# Patient Record
Sex: FEMALE | Race: WHITE | NOT HISPANIC OR LATINO | Employment: OTHER | URBAN - NONMETROPOLITAN AREA
[De-identification: names, ages, dates, MRNs, and addresses within clinical notes are randomized per-mention and may not be internally consistent; named-entity substitution may affect disease eponyms.]

---

## 2017-01-16 ENCOUNTER — TRANSCRIBE ORDERS (OUTPATIENT)
Dept: ADMINISTRATIVE | Facility: HOSPITAL | Age: 67
End: 2017-01-16

## 2017-01-16 DIAGNOSIS — R00.2 PALPITATIONS: ICD-10-CM

## 2017-01-16 DIAGNOSIS — I15.9 SECONDARY HYPERTENSION: ICD-10-CM

## 2017-01-16 DIAGNOSIS — I10 ESSENTIAL HYPERTENSION: Primary | ICD-10-CM

## 2017-01-19 ENCOUNTER — HOSPITAL ENCOUNTER (INPATIENT)
Facility: HOSPITAL | Age: 67
LOS: 1 days | Discharge: HOME | End: 2017-01-20
Attending: INTERNAL MEDICINE | Admitting: INTERNAL MEDICINE

## 2017-01-19 ENCOUNTER — HOSPITAL ENCOUNTER (OUTPATIENT)
Dept: CARDIOLOGY | Facility: HOSPITAL | Age: 67
Discharge: HOME | End: 2017-01-19
Admitting: NURSE PRACTITIONER

## 2017-01-19 ENCOUNTER — APPOINTMENT (OUTPATIENT)
Dept: GENERAL RADIOLOGY | Facility: HOSPITAL | Age: 67
End: 2017-01-19

## 2017-01-19 VITALS
SYSTOLIC BLOOD PRESSURE: 128 MMHG | WEIGHT: 200 LBS | HEIGHT: 64 IN | DIASTOLIC BLOOD PRESSURE: 71 MMHG | BODY MASS INDEX: 34.15 KG/M2 | HEART RATE: 66 BPM

## 2017-01-19 DIAGNOSIS — I48.0 PAROXYSMAL ATRIAL FIBRILLATION (HCC): Primary | ICD-10-CM

## 2017-01-19 DIAGNOSIS — I10 ESSENTIAL HYPERTENSION: ICD-10-CM

## 2017-01-19 DIAGNOSIS — R00.2 PALPITATIONS: ICD-10-CM

## 2017-01-19 DIAGNOSIS — R07.89 OTHER CHEST PAIN: ICD-10-CM

## 2017-01-19 DIAGNOSIS — I15.9 SECONDARY HYPERTENSION: ICD-10-CM

## 2017-01-19 LAB
ALBUMIN SERPL-MCNC: 4 G/DL (ref 3.5–5)
ALBUMIN/GLOB SERPL: 1.4 G/DL (ref 1.1–2.5)
ALP SERPL-CCNC: 53 U/L (ref 24–120)
ALT SERPL W P-5'-P-CCNC: 36 U/L (ref 0–54)
ANION GAP SERPL CALCULATED.3IONS-SCNC: 10 MMOL/L (ref 4–13)
APTT PPP: 25.4 SECONDS (ref 24.1–34.8)
AST SERPL-CCNC: 30 U/L (ref 7–45)
BASOPHILS # BLD AUTO: 0.02 10*3/MM3 (ref 0–0.2)
BASOPHILS NFR BLD AUTO: 0.3 % (ref 0–2)
BH CV STRESS BP STAGE 1: NORMAL
BH CV STRESS BP STAGE 2: NORMAL
BH CV STRESS DURATION MIN STAGE 1: 3
BH CV STRESS DURATION MIN STAGE 2: 2
BH CV STRESS DURATION SEC STAGE 1: 0
BH CV STRESS DURATION SEC STAGE 2: 2
BH CV STRESS GRADE STAGE 1: 10
BH CV STRESS GRADE STAGE 2: 12
BH CV STRESS HR STAGE 1: 116
BH CV STRESS HR STAGE 2: 184
BH CV STRESS METS STAGE 1: 5
BH CV STRESS METS STAGE 2: 7.5
BH CV STRESS PROTOCOL 1: NORMAL
BH CV STRESS RECOVERY BP: NORMAL MMHG
BH CV STRESS RECOVERY HR: 68 BPM
BH CV STRESS SPEED STAGE 1: 1.7
BH CV STRESS SPEED STAGE 2: 2.5
BH CV STRESS STAGE 1: 1
BH CV STRESS STAGE 2: 2
BILIRUB SERPL-MCNC: 0.7 MG/DL (ref 0.1–1)
BUN BLD-MCNC: 15 MG/DL (ref 5–21)
BUN/CREAT SERPL: 20.8 (ref 7–25)
CALCIUM SPEC-SCNC: 8.8 MG/DL (ref 8.4–10.4)
CHLORIDE SERPL-SCNC: 101 MMOL/L (ref 98–110)
CO2 SERPL-SCNC: 30 MMOL/L (ref 24–31)
CREAT BLD-MCNC: 0.72 MG/DL (ref 0.5–1.4)
D DIMER PPP FEU-MCNC: 0.27 MG/L (FEU) (ref 0–0.5)
DEPRECATED RDW RBC AUTO: 42.3 FL (ref 40–54)
EOSINOPHIL # BLD AUTO: 0.12 10*3/MM3 (ref 0–0.7)
EOSINOPHIL NFR BLD AUTO: 1.8 % (ref 0–4)
ERYTHROCYTE [DISTWIDTH] IN BLOOD BY AUTOMATED COUNT: 12.6 % (ref 12–15)
GFR SERPL CREATININE-BSD FRML MDRD: 81 ML/MIN/1.73
GLOBULIN UR ELPH-MCNC: 2.9 GM/DL
GLUCOSE BLD-MCNC: 97 MG/DL (ref 70–100)
HCT VFR BLD AUTO: 40.8 % (ref 37–47)
HGB BLD-MCNC: 13.8 G/DL (ref 12–16)
IMM GRANULOCYTES # BLD: 0.02 10*3/MM3 (ref 0–0.03)
IMM GRANULOCYTES NFR BLD: 0.3 % (ref 0–5)
INR PPP: 0.94 (ref 0.91–1.09)
LYMPHOCYTES # BLD AUTO: 2.07 10*3/MM3 (ref 0.72–4.86)
LYMPHOCYTES NFR BLD AUTO: 30.7 % (ref 15–45)
MAXIMAL PREDICTED HEART RATE: 154 BPM
MCH RBC QN AUTO: 31.2 PG (ref 28–32)
MCHC RBC AUTO-ENTMCNC: 33.8 G/DL (ref 33–36)
MCV RBC AUTO: 92.1 FL (ref 82–98)
MONOCYTES # BLD AUTO: 0.41 10*3/MM3 (ref 0.19–1.3)
MONOCYTES NFR BLD AUTO: 6.1 % (ref 4–12)
NEUTROPHILS # BLD AUTO: 4.11 10*3/MM3 (ref 1.87–8.4)
NEUTROPHILS NFR BLD AUTO: 60.8 % (ref 39–78)
NT-PROBNP SERPL-MCNC: 301 PG/ML (ref 0–900)
PERCENT MAX PREDICTED HR: 119.48 %
PLATELET # BLD AUTO: 209 10*3/MM3 (ref 130–400)
PMV BLD AUTO: 11.1 FL (ref 6–12)
POTASSIUM BLD-SCNC: 4.1 MMOL/L (ref 3.5–5.3)
PROT SERPL-MCNC: 6.9 G/DL (ref 6.3–8.7)
PROTHROMBIN TIME: 12.8 SECONDS (ref 11.9–14.6)
RBC # BLD AUTO: 4.43 10*6/MM3 (ref 4.2–5.4)
SODIUM BLD-SCNC: 141 MMOL/L (ref 135–145)
STRESS BASELINE BP: NORMAL MMHG
STRESS BASELINE HR: 82 BPM
STRESS PERCENT HR: 141 %
STRESS POST EXERCISE DUR MIN: 5 MIN
STRESS POST EXERCISE DUR SEC: 2 SEC
STRESS POST PEAK BP: NORMAL MMHG
STRESS POST PEAK HR: 184 BPM
STRESS TARGET HR: 131 BPM
T3FREE SERPL-MCNC: 3.56 PG/ML (ref 2.77–5.27)
T4 FREE SERPL-MCNC: 0.94 NG/DL (ref 0.78–2.19)
TROPONIN I SERPL-MCNC: 0 NG/ML (ref 0–0.07)
TROPONIN I SERPL-MCNC: 0 NG/ML (ref 0–0.07)
TROPONIN I SERPL-MCNC: <0.012 NG/ML (ref 0–0.03)
TSH SERPL DL<=0.05 MIU/L-ACNC: 1.08 MIU/ML (ref 0.47–4.68)
WBC NRBC COR # BLD: 6.75 10*3/MM3 (ref 4.8–10.8)

## 2017-01-19 PROCEDURE — 36415 COLL VENOUS BLD VENIPUNCTURE: CPT

## 2017-01-19 PROCEDURE — 85730 THROMBOPLASTIN TIME PARTIAL: CPT | Performed by: INTERNAL MEDICINE

## 2017-01-19 PROCEDURE — 93005 ELECTROCARDIOGRAM TRACING: CPT

## 2017-01-19 PROCEDURE — 71010 HC CHEST PA OR AP: CPT

## 2017-01-19 PROCEDURE — 93018 CV STRESS TEST I&R ONLY: CPT | Performed by: INTERNAL MEDICINE

## 2017-01-19 PROCEDURE — 85025 COMPLETE CBC W/AUTO DIFF WBC: CPT | Performed by: PHYSICIAN ASSISTANT

## 2017-01-19 PROCEDURE — 93352 ADMIN ECG CONTRAST AGENT: CPT | Performed by: INTERNAL MEDICINE

## 2017-01-19 PROCEDURE — 93010 ELECTROCARDIOGRAM REPORT: CPT | Performed by: INTERNAL MEDICINE

## 2017-01-19 PROCEDURE — 84481 FREE ASSAY (FT-3): CPT | Performed by: PHYSICIAN ASSISTANT

## 2017-01-19 PROCEDURE — 80053 COMPREHEN METABOLIC PANEL: CPT | Performed by: PHYSICIAN ASSISTANT

## 2017-01-19 PROCEDURE — 99285 EMERGENCY DEPT VISIT HI MDM: CPT

## 2017-01-19 PROCEDURE — 84484 ASSAY OF TROPONIN QUANT: CPT | Performed by: INTERNAL MEDICINE

## 2017-01-19 PROCEDURE — 85610 PROTHROMBIN TIME: CPT | Performed by: INTERNAL MEDICINE

## 2017-01-19 PROCEDURE — 84484 ASSAY OF TROPONIN QUANT: CPT

## 2017-01-19 PROCEDURE — 93005 ELECTROCARDIOGRAM TRACING: CPT | Performed by: PHYSICIAN ASSISTANT

## 2017-01-19 PROCEDURE — 93350 STRESS TTE ONLY: CPT | Performed by: INTERNAL MEDICINE

## 2017-01-19 PROCEDURE — 84443 ASSAY THYROID STIM HORMONE: CPT | Performed by: PHYSICIAN ASSISTANT

## 2017-01-19 PROCEDURE — 85379 FIBRIN DEGRADATION QUANT: CPT | Performed by: PHYSICIAN ASSISTANT

## 2017-01-19 PROCEDURE — 84439 ASSAY OF FREE THYROXINE: CPT | Performed by: PHYSICIAN ASSISTANT

## 2017-01-19 PROCEDURE — 83880 ASSAY OF NATRIURETIC PEPTIDE: CPT | Performed by: PHYSICIAN ASSISTANT

## 2017-01-19 RX ORDER — ESTRADIOL 10 UG/1
1 INSERT VAGINAL 2 TIMES WEEKLY
COMMUNITY

## 2017-01-19 RX ORDER — PANTOPRAZOLE SODIUM 40 MG/1
40 TABLET, DELAYED RELEASE ORAL
Status: DISCONTINUED | OUTPATIENT
Start: 2017-01-20 | End: 2017-01-20 | Stop reason: HOSPADM

## 2017-01-19 RX ORDER — MULTIVIT,IRON,MINERALS/LUTEIN
1 TABLET ORAL DAILY
COMMUNITY

## 2017-01-19 RX ORDER — FLUTICASONE PROPIONATE 50 MCG
1 SPRAY, SUSPENSION (ML) NASAL 2 TIMES DAILY
COMMUNITY
Start: 2016-11-14 | End: 2017-09-20 | Stop reason: ALTCHOICE

## 2017-01-19 RX ORDER — DILTIAZEM HCL/D5W 125 MG/125
5-15 PLASTIC BAG, INJECTION (ML) INTRAVENOUS
Status: DISCONTINUED | OUTPATIENT
Start: 2017-01-19 | End: 2017-01-19

## 2017-01-19 RX ORDER — METOPROLOL TARTRATE 5 MG/5ML
5 INJECTION INTRAVENOUS ONCE
Status: COMPLETED | OUTPATIENT
Start: 2017-01-19 | End: 2017-01-19

## 2017-01-19 RX ORDER — ASPIRIN 325 MG
325 TABLET, DELAYED RELEASE (ENTERIC COATED) ORAL DAILY
Status: DISCONTINUED | OUTPATIENT
Start: 2017-01-20 | End: 2017-01-20

## 2017-01-19 RX ORDER — OLOPATADINE HYDROCHLORIDE 665 UG/1
2 SPRAY NASAL 2 TIMES DAILY
Status: DISCONTINUED | OUTPATIENT
Start: 2017-01-19 | End: 2017-01-19 | Stop reason: CLARIF

## 2017-01-19 RX ORDER — AZELASTINE 1 MG/ML
2 SPRAY, METERED NASAL DAILY
Status: DISCONTINUED | OUTPATIENT
Start: 2017-01-20 | End: 2017-01-20 | Stop reason: HOSPADM

## 2017-01-19 RX ORDER — ASPIRIN 325 MG
325 TABLET ORAL ONCE
Status: COMPLETED | OUTPATIENT
Start: 2017-01-19 | End: 2017-01-19

## 2017-01-19 RX ORDER — MORPHINE SULFATE 2 MG/ML
1 INJECTION, SOLUTION INTRAMUSCULAR; INTRAVENOUS
Status: DISCONTINUED | OUTPATIENT
Start: 2017-01-19 | End: 2017-01-20 | Stop reason: HOSPADM

## 2017-01-19 RX ORDER — METOPROLOL TARTRATE 5 MG/5ML
INJECTION INTRAVENOUS
Status: COMPLETED
Start: 2017-01-19 | End: 2017-01-19

## 2017-01-19 RX ORDER — NITROGLYCERIN 0.4 MG/1
0.4 TABLET SUBLINGUAL
Status: DISCONTINUED | OUTPATIENT
Start: 2017-01-19 | End: 2017-01-20 | Stop reason: HOSPADM

## 2017-01-19 RX ORDER — METOPROLOL TARTRATE 5 MG/5ML
5 INJECTION INTRAVENOUS EVERY 4 HOURS PRN
Status: DISCONTINUED | OUTPATIENT
Start: 2017-01-19 | End: 2017-01-20 | Stop reason: HOSPADM

## 2017-01-19 RX ORDER — ATORVASTATIN CALCIUM 20 MG/1
20 TABLET, FILM COATED ORAL NIGHTLY
COMMUNITY
Start: 2016-11-17

## 2017-01-19 RX ORDER — SODIUM CHLORIDE 0.9 % (FLUSH) 0.9 %
10 SYRINGE (ML) INJECTION AS NEEDED
Status: DISCONTINUED | OUTPATIENT
Start: 2017-01-19 | End: 2017-01-19

## 2017-01-19 RX ADMIN — SODIUM CHLORIDE 5 ML: 9 INJECTION INTRAMUSCULAR; INTRAVENOUS; SUBCUTANEOUS at 12:03

## 2017-01-19 RX ADMIN — SODIUM CHLORIDE 5 ML: 9 INJECTION INTRAMUSCULAR; INTRAVENOUS; SUBCUTANEOUS at 11:33

## 2017-01-19 RX ADMIN — METOPROLOL TARTRATE 5 MG: 5 INJECTION INTRAVENOUS at 12:26

## 2017-01-19 RX ADMIN — ASPIRIN 325 MG: 325 TABLET, COATED ORAL at 14:58

## 2017-01-19 NOTE — ED PROVIDER NOTES
Subjective   Patient is a 66 y.o. female presenting with chest pain.   Chest Pain   Associated symptoms: fatigue and shortness of breath    Associated symptoms: no cough      Patient is a pleasant 66-year-old  female with chief complaint of chest pain and palpitations.  She reports she initially had palpitations intermittently that has been worsening for the past month.  She has been experiencing this on a daily basis.  Before this month, she had felt it intermittently and few and far between.  Her primary care physician had ordered an outpatient stress test.  During the stress test, the patient developed an episode of atrial fibrillation with a heart rate in 180s.  The patient was given Lopressor 5 mL intravenously and converted to normal sinus rhythm.  The patient was ultimately discharged after completing the study.  She was advised to go see her nurse practitioner.  When the patient contacted her PCP, due to her current chest pain, she was advised to go to the ER for further evaluation.    The patient describes her chest discomfort as a pressure in the middle of her chest.  She had some nausea and shortness of breath.  She does complain of some fatigue for the past one month as well.  She denies any diaphoresis.  She denies any pain presently but did have an episode prior to my assessment.  She reports she had a stress test completed years ago and it was negative.  She does have a history of hypertension, hypercholesterolemia, and family cardiac history.    The patient does have a known history of thyroid nodules.  She completes annual thyroid ultrasounds where she had one recently.  She denies any recent thyroid abnormalities.    She denies any decongestants or stimulants aside from her usual.  She does drink 6 cans of diet Dr. Pepper daily.    Review of Systems   Constitutional: Positive for fatigue.   Respiratory: Positive for shortness of breath. Negative for cough.    Cardiovascular: Positive for  chest pain.   Genitourinary: Negative.    All other systems reviewed and are negative.      Past Medical History   Diagnosis Date   • Arthritis    • Diabetes mellitus      Pre-Diabetic   • Dizziness    • Goiter    • Hyperlipidemia      Mixed   • Hypertension    • Thyroid nodule    • Tremor        Allergies   Allergen Reactions   • Streptomycin    • Terramycin [Oxytetracycline]        Past Surgical History   Procedure Laterality Date   • Tubal abdominal ligation     • Tonsillectomy         Family History   Problem Relation Age of Onset   • Cancer Mother    • Heart disease Mother    • Cancer Father        Social History     Social History   • Marital status:      Spouse name: N/A   • Number of children: N/A   • Years of education: N/A     Social History Main Topics   • Smoking status: Never Smoker   • Smokeless tobacco: None   • Alcohol use Yes      Comment: one drink every 2 months   • Drug use: Defer   • Sexual activity: Not Asked     Other Topics Concern   • None     Social History Narrative       Prior to Admission medications    Medication Sig Start Date End Date Taking? Authorizing Provider   aspirin 81 MG EC tablet Take  by mouth. Delayed Release (DR/EC)   Yes Historical Provider, MD   ATORVASTATIN CALCIUM PO Take  by mouth.   Yes Historical Provider, MD   magnesium oxide (MAGOX) 400 (241.3 MG) MG tablet tablet Take 400 mg by mouth daily.   Yes Historical Provider, MD   olopatadine (PATANASE) 0.6 % solution nasal solution 2 sprays by Each Nare route 2 (two) times a day. 9/14/16  Yes PORTER Read   Omega-3 Fatty Acids (FISH OIL PO) Take  by mouth.   Yes Historical Provider, MD   omeprazole (PriLOSEC) 20 MG capsule Delayed Release (DR/EC)  Take 1 capsule by oral route 2 times a day 7/17/13  Yes Historical Provider, MD       Medications   sodium chloride 0.9 % flush 10 mL (not administered)   diltiaZEM (CARDIZEM) 125mg/125 mL infusion (not administered)   aspirin tablet 325 mg (325 mg Oral Given 1/19/17  "5372)       Visit Vitals   • /53   • Pulse 99   • Temp 97.5 °F (36.4 °C) (Temporal Artery )   • Resp 16   • Ht 64\" (162.6 cm)   • Wt 200 lb (90.7 kg)   • SpO2 96%   • BMI 34.33 kg/m2         Objective   Physical Exam   Constitutional: She is oriented to person, place, and time. She appears well-developed and well-nourished. No distress.   HENT:   Head: Normocephalic and atraumatic.   Eyes: Conjunctivae and EOM are normal. Pupils are equal, round, and reactive to light.   Neck: Normal range of motion. Neck supple. No tracheal deviation present.   Cardiovascular: Normal rate, regular rhythm, normal heart sounds and intact distal pulses.    No murmur heard.  Pulmonary/Chest: Effort normal and breath sounds normal.   Abdominal: Soft. Bowel sounds are normal. She exhibits no distension and no mass. There is no tenderness. There is no rebound and no guarding.   Musculoskeletal: Normal range of motion. She exhibits edema.   2+ nonpitting edema to BLE.    Neurological: She is alert and oriented to person, place, and time. She has normal reflexes.   Skin: Skin is warm and dry. She is not diaphoretic.   Psychiatric: She has a normal mood and affect.   Nursing note and vitals reviewed.      Procedures         Lab Results (last 24 hours)     Procedure Component Value Units Date/Time    CBC & Differential [[id number]] Collected:  01/19/17 1420    Specimen:  Blood Updated:  01/19/17 1643    Narrative:       The following orders were created for panel order CBC & Differential.  Procedure                               Abnormality         Status                     ---------                               -----------         ------                     CBC Auto Differential[[id number]]         Normal              Final result                 Please view results for these tests on the individual orders.    Comprehensive Metabolic Panel [[id number]] Collected:  01/19/17 1420    Specimen:  Blood from Arm, Right Updated:  01/19/17 1518    "  Glucose 97 mg/dL      BUN 15 mg/dL      Creatinine 0.72 mg/dL      Sodium 141 mmol/L      Potassium 4.1 mmol/L      Chloride 101 mmol/L      CO2 30.0 mmol/L      Calcium 8.8 mg/dL      Total Protein 6.9 g/dL      Albumin 4.00 g/dL      ALT (SGPT) 36 U/L      AST (SGOT) 30 U/L      Alkaline Phosphatase 53 U/L      Total Bilirubin 0.7 mg/dL      eGFR Non African Amer 81 mL/min/1.73      Globulin 2.9 gm/dL      A/G Ratio 1.4 g/dL      BUN/Creatinine Ratio 20.8      Anion Gap 10.0 mmol/L     D-dimer, Quantitative [[id number]]  (Normal) Collected:  01/19/17 1420    Specimen:  Blood from Arm, Right Updated:  01/19/17 1537     D-Dimer, Quantitative 0.27 mg/L (FEU)     Narrative:       Reference Range is 0-0.50 mg/L FEU. However, results <0.50 mg/L FEU tends to rule out DVT or PE. Results >0.50 mg/L FEU are not useful in predicting absence or presence of DVT or PE.    BNP [[id number]]  (Normal) Collected:  01/19/17 1420    Specimen:  Blood from Arm, Right Updated:  01/19/17 1526     proBNP 301.0 pg/mL     TSH [[id number]]  (Normal) Collected:  01/19/17 1420    Specimen:  Blood from Arm, Right Updated:  01/19/17 1549     TSH 1.080 mIU/mL     T3, Free [[id number]]  (Normal) Collected:  01/19/17 1420    Specimen:  Blood from Arm, Right Updated:  01/19/17 1536     T3, Free 3.56 pg/mL     T4, Free [[id number]]  (Normal) Collected:  01/19/17 1420    Specimen:  Blood from Arm, Right Updated:  01/19/17 1536     Free T4 0.94 ng/dL     CBC Auto Differential [[id number]]  (Normal) Collected:  01/19/17 1420    Specimen:  Blood from Arm, Right Updated:  01/19/17 1643     WBC 6.75 10*3/mm3      RBC 4.43 10*6/mm3      Hemoglobin 13.8 g/dL      Hematocrit 40.8 %      MCV 92.1 fL      MCH 31.2 pg      MCHC 33.8 g/dL      RDW 12.6 %      RDW-SD 42.3 fl      MPV 11.1 fL      Platelets 209 10*3/mm3      Neutrophil % 60.8 %      Lymphocyte % 30.7 %      Monocyte % 6.1 %      Eosinophil % 1.8 %      Basophil % 0.3 %      Immature Grans % 0.3 %       Neutrophils, Absolute 4.11 10*3/mm3      Lymphocytes, Absolute 2.07 10*3/mm3      Monocytes, Absolute 0.41 10*3/mm3      Eosinophils, Absolute 0.12 10*3/mm3      Basophils, Absolute 0.02 10*3/mm3      Immature Grans, Absolute 0.02 10*3/mm3     POC Troponin, Rapid [[id number]]  (Normal) Collected:  01/19/17 1436    Specimen:  Blood Updated:  01/19/17 1453     Troponin I 0.00 ng/mL       Serial Number: [id number]    : [id number]       POC Troponin, Rapid [[id number]]  (Normal) Collected:  01/19/17 1750    Specimen:  Blood Updated:  01/19/17 1806     Troponin I 0.00 ng/mL       Serial Number: [id number]    : [id number]             Xr Chest 1 View    Result Date: 1/19/2017  Narrative: EXAMINATION:   XR CHEST 1 VW-  1/19/2017 2:55 PM CST  HISTORY: Chest pain  Single view of the chest is obtained. The lungs are clear. The cardiac silhouette is normal. The pleural surfaces are unremarkable.      Impression: Negative single view chest.  Electronically Signed By-Dr. Toby Goetz MD On:1/19/2017 3:55 PM EST This report was finalized on 01/19/2017 14:55 by Dr. Toby Goetz MD.      ED Course  ED Course          Interpretation Summary   · Normal baseline ECG noted.  · The patient reported shortness of breath during the stress test. The patient experienced no angina during the stress test.  · Stress ECG rhythm of atrial fibrillation noted.  · Upward-sloping ST segment depression of 2 mm was noted during stress in the inferolateral leads (II, III, aVF, V5 and V6), while in atrial fibrillation with RVR.  · Segment augmentation had a normal response to stress.  · No significant echocardiographic evidence for myocardial ischemia - likely low risk of ischemia, given no chest pain and normal wall motion.       Study Description   Stress echo with contrast and continuous EKG monitoring during procedure. The study is technically good for diagnosis.Verbal consent was obtained from the patient to use Definity contrast in order to  optimize the study.   No adverse reaction to contrast was noted.   Normal sinus was the predominant rhythm observed during the procedure.   Echocardiogram Findings   Left Ventricle  Left ventricular function is normal. All left ventricular wall segments contract normally.   Stress Procedure   Rest ECG  Baseline ECG of normal sinus rhythm noted. Normal baseline ECG noted.   There was no ST segment deviation noted.   Stress ECG  Stress ECG rhythm of atrial fibrillation noted.   Upward-sloping ST segment depression of 2 mm was noted during stress in the inferolateral leads (II, III, aVF, V5 and V6).   Arrhythmias during stress: atrial fibrillation.   Arrhythmias during recovery: atrial fibrillation.   Arrhythmias were significant.   ECG was interpretable and indicates an abnormal stress ECG.   Abnormal with interpretable ST segment stress ECG interpretation.   Stress Description  A stress test was performed following the [facility] protocol.   The patient achieved the target heart rate. The test was stopped because the technologist observed ECG changes and the patient complained of fatigue.  The patient reported shortness of breath during the stress test. The patient experienced no angina during the stress test.   Blood pressure demonstrated a normal response to exercise. Heart rate demonstrated an exaggerated response to exercise. Overall, the patient's exercise capacity was mildly impaired.   Stress Echo Findings   Ventricle Size / Description  Segment augmentation had a normal response to stress. Cavity size behaved normally in response to stress.   Stress Echo - Peak Stress Findings  Post stress images with adequate visualization of myocardium to assess for ischemia. Normal stress echo with no significant echocardiographic evidence for myocardial ischemia.   Stress Data   Stage HR (bpm) BP (mmHg) Minutes Seconds Grade (%) Speed (MPH)   1        116        132/102        3        0        10        1.7          2         184        122/63        2        2        12        2.5          Stress Measurements   Baseline Vitals   Baseline HR 82 bpm      Baseline /71 mmHg       Peak Stress Vitals   Peak  bpm      Peak /63 mmHg       Recovery Vitals   Recovery HR 68 bpm      Recovery /84 mmHg       Exercise Data   Target HR (85%) 131 bpm      Max. Pred. HR (100%) 154 bpm      Percent Max Pred .48 %      Exercise duration (min) 5 min      Exercise duration (sec) 2 sec         Wall Scoring   Resting Score Index: 1.000 Percent Normal: 100.0%            The left ventricular wall motion is normal.               Study Information   Physician Technologist Supporting Staff    Rere Duff RN   PACS Images   Show images for Adult Stress Echo Only   Signed   Electronically signed by Laz Martinez MD on 1/19/17 at 1429 CST   Printable Result Report   Result Report    Encounter   View Encounter      Results Routing Tracking   View Results Routing Information   Adult Stress Echo Only [ECH82] (Order [id number])   Stress Echocardiography   Date: 1/19/2017 Department: [facility] CARDIOLOGY Released By: Viv Rosales Authorizing: PORTER Cabrales   Procedure Abnormality Status   Adult Stress Echo Only     Order-Level Documents:   Scan on 1/19/2017 1:45 PM by Elizabeth Duff RN : ekgScan on 1/19/2017 1:45 PM by Elizabeth Duff RN : ekg   Scan on 1/19/2017 1:46 PM by Elizabeth Duff RN : protocolsScan on 1/19/2017 1:46 PM by Elizabeth Duff RN : protocols   Order History   Inpatient   Date/Time Action Taken User Additional Information    0000 Result Elizabeth Duff RN In process   01/19/17 1130 Release Viv Rosales From Order: [id number]   01/19/17 1310 Result Interface, Rad Results [place] In In process   01/19/17 1311 Result Interface, Rad Results [place] In In process   01/19/17 1405 Result Elizabeth Duff RN In process   01/19/17 1405 Result Elizabeth Duff RN In process    01/19/17 1421 Result Elizabeth Duff RN In process   01/19/17 1422 Result Laz Martinez MD Final   Order Details   Frequency Duration Priority Order Class   Once 1  occurrence Routine Hospital Performed   Start Date/Time   Start Date Start Time   01/19/17 1130   Order Questions   Question Answer Comment   Reason for exam? Chest Pain PALPITATIONS   Procedures Performed   Code Procedure Name   ECH90 ECHOCARDIOGRAM STRESS TEST AND LIMITED ECHOCARDIOGRAM      I have contacted Dr. Farah, hospitalist on call.  He will admit the patient under his services.    MDM    Final diagnoses:   Paroxysmal atrial fibrillation   Other chest pain          u-[facility] AGUS Neumann  01/19/17 4346

## 2017-01-19 NOTE — IP AVS SNAPSHOT
AFTER VISIT SUMMARY             Terra Garcia           About your hospitalization     You were admitted on:  January 19, 2017 You last received care in the:  [facility]       Procedures & Surgeries         Medications    If you or your caregiver advised us that you are currently taking a medication and that medication is marked below as “Resume”, this simply indicates that we have reviewed those medications to make sure our new therapy recommendations do not interfere.  If you have concerns about medications other than those new ones which we are prescribing today, please consult the physician who prescribed them (or your primary physician).  Our review of your home medications is not meant to indicate that we are directing their use.             Your Medications      START taking these medications     metoprolol tartrate 25 MG tablet   Take 0.5 tablets by mouth Every 12 (Twelve) Hours.   Commonly known as:  LOPRESSOR           rivaroxaban 20 MG tablet   Take 1 tablet by mouth Daily With Dinner.   Commonly known as:  XARELTO             CHANGE how you take these medications     atorvastatin 20 MG tablet   Take 20 mg by mouth Every Night.   Commonly known as:  LIPITOR   What changed:  Another medication with the same name was removed. Continue taking this medication, and follow the directions you see here.           fluticasone 50 MCG/ACT nasal spray   1 spray into each nostril 2 (Two) Times a Day.   Commonly known as:  FLONASE   What changed:  Another medication with the same name was removed. Continue taking this medication, and follow the directions you see here.             CONTINUE taking these medications     aspirin 81 MG EC tablet   Take 81 mg by mouth Daily. Delayed Release (DR/EC)   Last time this was given:  1/20/2017  8:42 AM           CENTRUM SILVER ULTRA WOMENS tablet   Take 1 tablet by mouth Daily.           estradiol 10 MCG tablet vaginal tablet   Insert 1 tablet into the  vagina 2 (Two) Times a Week. Mondays and Fridays   Commonly known as:  VAGIFEM           FISH OIL PO   Take 1,200 mg by mouth 3 (Three) Times a Day.           magnesium oxide 400 (241.3 MG) MG tablet tablet   Take 400 mg by mouth 3 (Three) Times a Day.   Last time this was given:  1/20/2017  8:42 AM   Commonly known as:  MAGOX           olopatadine 0.6 % solution nasal solution   2 sprays by Each Nare route 2 (two) times a day.   Commonly known as:  PATANASE           omeprazole 20 MG capsule   Take 20 mg by mouth 2 (Two) Times a Day. Delayed Release (DR/EC) Take 1 capsule by oral route 2 times a day   Commonly known as:  priLOSEC                Where to Get Your Medications      These medications were sent to [place], [place] - [address] [facility] - [phone]  - [phone] [facility] [address], [facility] [address]     Phone:  [phone]     metoprolol tartrate 25 MG tablet    rivaroxaban 20 MG tablet                  Your Medications      Your Medication List           Morning Noon Evening Bedtime As Needed    aspirin 81 MG EC tablet   Take 81 mg by mouth Daily. Delayed Release (DR/EC)                                atorvastatin 20 MG tablet   Take 20 mg by mouth Every Night.   Commonly known as:  LIPITOR                                CENTRUM SILVER ULTRA WOMENS tablet   Take 1 tablet by mouth Daily.                                estradiol 10 MCG tablet vaginal tablet   Insert 1 tablet into the vagina 2 (Two) Times a Week. Mondays and Fridays   Commonly known as:  VAGIFEM                                FISH OIL PO   Take 1,200 mg by mouth 3 (Three) Times a Day.                                fluticasone 50 MCG/ACT nasal spray   1 spray into each nostril 2 (Two) Times a Day.   Commonly known as:  FLONASE                                magnesium oxide 400 (241.3 MG) MG tablet tablet   Take 400 mg by mouth 3 (Three) Times a Day.   Commonly known as:  MAGOX                                 metoprolol tartrate 25 MG tablet   Take 0.5 tablets by mouth Every 12 (Twelve) Hours.   Commonly known as:  LOPRESSOR                                olopatadine 0.6 % solution nasal solution   2 sprays by Each Nare route 2 (two) times a day.   Commonly known as:  PATANASE                                omeprazole 20 MG capsule   Take 20 mg by mouth 2 (Two) Times a Day. Delayed Release (DR/EC) Take 1 capsule by oral route 2 times a day   Commonly known as:  priLOSEC                                rivaroxaban 20 MG tablet   Take 1 tablet by mouth Daily With Dinner.   Commonly known as:  XARELTO                                         Instructions for After Discharge        Activity Instructions     Activity as Tolerated               Additional Activity Instructions:      Keep BP and HR diary to assist with OP management  Watch for any S/SX of bleeding, call MD is this occurs.               Diet Instructions     Diet: Cardiac; Thin Liquids, No Restrictions       Discharge Diet:  Cardiac   Fluid Consistency:  Thin Liquids, No Restrictions             Discharge References/Attachments     METOPROLOL TABLETS (ENGLISH)    RIVAROXABAN ORAL TABLETS (ENGLISH)    ATRIAL FIBRILLATION, EASY-TO-READ (ENGLISH)       Follow-ups for After Discharge        Follow-up Information     Follow up with PORTER Brennan Follow up in 3 day(s).    Specialty:  Nurse Practitioner    Contact information:    [address]  [facility] [address]  [phone]          Follow up with Orlando Andrews MD Follow up in 2 week(s).    Specialty:  Cardiology    Contact information:    [address] [id number] [phone]        Referrals and Follow-ups to Schedule     Basic Metabolic Panel    Jan 25, 2017 (Approximate)        CBC & Differential    Jan 25, 2017 (Approximate)              MyChart Signup     Our records indicate that your [facility] [employer] account has been deactivated. If you would like to  reactivate your account, please email [email or web address] or call [phone] to talk to our [place] staff.         Summary of Your Hospitalization        Reason for Hospitalization     Your primary diagnosis was:  Not on File    Your diagnoses also included:  Atrial Fibrillation (Irregular Heartbeat)      Care Providers     Provider Service Role Specialty    Salma Green DO -- Attending Provider Hospitalist    Orlando Andrews MD -- Consulting Physician  Cardiology      Your Allergies  Date Reviewed: 1/19/2017    Allergen Reactions    Streptomycin Not Noted         Terramycin (Oxytetracycline) Not Noted      Patient Belongings Returned     Document Return of Belongings Flowsheet     Were the patient bedside belongings sent home?   N/A   Belongings Retrieved from Security & Sent Home   N/A    Belongings Sent to Safe   --   Medications Retrieved from Pharmacy & Sent Home   --              More Information      Metoprolol tablets  What is this medicine?  METOPROLOL (me TOE proe lole) is a beta-blocker. Beta-blockers reduce the workload on the heart and help it to beat more regularly. This medicine is used to treat high blood pressure and to prevent chest pain. It is also used to after a heart attack and to prevent an additional heart attack from occurring.  This medicine may be used for other purposes; ask your health care provider or pharmacist if you have questions.  What should I tell my health care provider before I take this medicine?  They need to know if you have any of these conditions:  -diabetes  -heart or vessel disease like slow heart rate, worsening heart failure, heart block, sick sinus syndrome or Raynaud's disease  -kidney disease  -liver disease  -lung or breathing disease, like asthma or emphysema  -pheochromocytoma  -thyroid disease  -an unusual or allergic reaction to metoprolol, other beta-blockers, medicines, foods, dyes, or preservatives  -pregnant or trying to get  pregnant  -breast-feeding  How should I use this medicine?  Take this medicine by mouth with a drink of water. Follow the directions on the prescription label. Take this medicine immediately after meals. Take your doses at regular intervals. Do not take more medicine than directed. Do not stop taking this medicine suddenly. This could lead to serious heart-related effects.  Talk to your pediatrician regarding the use of this medicine in children. Special care may be needed.  Overdosage: If you think you have taken too much of this medicine contact a poison control center or emergency room at once.  NOTE: This medicine is only for you. Do not share this medicine with others.  What if I miss a dose?  If you miss a dose, take it as soon as you can. If it is almost time for your next dose, take only that dose. Do not take double or extra doses.  What may interact with this medicine?  This medicine may interact with the following medications:  -certain medicines for blood pressure, heart disease, irregular heart beat  -certain medicines for depression like monoamine oxidase (MAO) inhibitors, fluoxetine, or paroxetine  -clonidine  -dobutamine  -epinephrine  -isoproterenol  -reserpine  This list may not describe all possible interactions. Give your health care provider a list of all the medicines, herbs, non-prescription drugs, or dietary supplements you use. Also tell them if you smoke, drink alcohol, or use illegal drugs. Some items may interact with your medicine.  What should I watch for while using this medicine?  Visit your doctor or health care professional for regular check ups. Contact your doctor right away if your symptoms worsen. Check your blood pressure and pulse rate regularly. Ask your health care professional what your blood pressure and pulse rate should be, and when you should contact them.  You may get drowsy or dizzy. Do not drive, use machinery, or do anything that needs mental alertness until you  know how this medicine affects you. Do not sit or stand up quickly, especially if you are an older patient. This reduces the risk of dizzy or fainting spells. Contact your doctor if these symptoms continue. Alcohol may interfere with the effect of this medicine. Avoid alcoholic drinks.  What side effects may I notice from receiving this medicine?  Side effects that you should report to your doctor or health care professional as soon as possible:  -allergic reactions like skin rash, itching or hives  -cold or numb hands or feet  -depression  -difficulty breathing  -faint  -fever with sore throat  -irregular heartbeat, chest pain  -rapid weight gain  -swollen legs or ankles  Side effects that usually do not require medical attention (report to your doctor or health care professional if they continue or are bothersome):  -anxiety or nervousness  -change in sex drive or performance  -dry skin  -headache  -nightmares or trouble sleeping  -short term memory loss  -stomach upset or diarrhea  -unusually tired  This list may not describe all possible side effects. Call your doctor for medical advice about side effects. You may report side effects to FDA at 8-039-FDA-6184.  Where should I keep my medicine?  Keep out of the reach of children.  Store at room temperature between 15 and 30 degrees C (59 and 86 degrees F). Throw away any unused medicine after the expiration date.  NOTE: This sheet is a summary. It may not cover all possible information. If you have questions about this medicine, talk to your doctor, pharmacist, or health care provider.     © 2016, Elsevier/Gold Standard. (2014-08-22 14:40:36)          Rivaroxaban oral tablets  What is this medicine?  RIVAROXABAN (ri va RUBENS a ban) is an anticoagulant (blood thinner). It is used to treat blood clots in the lungs or in the veins. It is also used after knee or hip surgeries to prevent blood clots. It is also used to lower the chance of stroke in people with a medical  condition called atrial fibrillation.  This medicine may be used for other purposes; ask your health care provider or pharmacist if you have questions.  What should I tell my health care provider before I take this medicine?  They need to know if you have any of these conditions:  -bleeding disorders  -bleeding in the brain  -blood in your stools (black or tarry stools) or if you have blood in your vomit  -history of stomach bleeding  -kidney disease  -liver disease  -low blood counts, like low white cell, platelet, or red cell counts  -recent or planned spinal or epidural procedure  -take medicines that treat or prevent blood clots  -an unusual or allergic reaction to rivaroxaban, other medicines, foods, dyes, or preservatives  -pregnant or trying to get pregnant  -breast-feeding  How should I use this medicine?  Take this medicine by mouth with a glass of water. Follow the directions on the prescription label. Take your medicine at regular intervals. Do not take it more often than directed. Do not stop taking except on your doctor's advice. Stopping this medicine may increase your risk of a blood clot. Be sure to refill your prescription before you run out of medicine.  If you are taking this medicine after hip or knee replacement surgery, take it with or without food. If you are taking this medicine for atrial fibrillation, take it with your evening meal. If you are taking this medicine to treat blood clots, take it with food at the same time each day. If you are unable to swallow your tablet, you may crush the tablet and mix it in applesauce. Then, immediately eat the applesauce. You should eat more food right after you eat the applesauce containing the crushed tablet.  Talk to your pediatrician regarding the use of this medicine in children. Special care may be needed.  Overdosage: If you think you have taken too much of this medicine contact a poison control center or emergency room at once.  NOTE: This  medicine is only for you. Do not share this medicine with others.  What if I miss a dose?  If you take your medicine once a day and miss a dose, take the missed dose as soon as you remember. If you take your medicine twice a day and miss a dose, take the missed dose immediately. In this instance, 2 tablets may be taken at the same time. The next day you should take 1 tablet twice a day as directed.  What may interact with this medicine?  -aspirin and aspirin-like medicines  -certain antibiotics like erythromycin, azithromycin, and clarithromycin  -certain medicines for fungal infections like ketoconazole and itraconazole  -certain medicines for irregular heart beat like amiodarone, quinidine, dronedarone  -certain medicines for seizures like carbamazepine, phenytoin  -certain medicines that treat or prevent blood clots like warfarin, enoxaparin, and dalteparin  -conivaptan  -diltiazem  -felodipine  -indinavir  -lopinavir; ritonavir  -NSAIDS, medicines for pain and inflammation, like ibuprofen or naproxen  -ranolazine  -rifampin  -ritonavir  -[place]'s wort  -verapamil  This list may not describe all possible interactions. Give your health care provider a list of all the medicines, herbs, non-prescription drugs, or dietary supplements you use. Also tell them if you smoke, drink alcohol, or use illegal drugs. Some items may interact with your medicine.  What should I watch for while using this medicine?  Visit your doctor or health care professional for regular checks on your progress. Your condition will be monitored carefully while you are receiving this medicine.  Notify your doctor or health care professional and seek emergency treatment if you develop breathing problems; changes in vision; chest pain; severe, sudden headache; pain, swelling, warmth in the leg; trouble speaking; sudden numbness or weakness of the face, arm, or leg. These can be signs that your condition has gotten worse.  If you are going to have  surgery, tell your doctor or health care professional that you are taking this medicine.  Tell your health care professional that you use this medicine before you have a spinal or epidural procedure. Sometimes people who take this medicine have bleeding problems around the spine when they have a spinal or epidural procedure. This bleeding is very rare. If you have a spinal or epidural procedure while on this medicine, call your health care professional immediately if you have back pain, numbness or tingling (especially in your legs and feet), muscle weakness, paralysis, or loss of bladder or bowel control.  Avoid sports and activities that might cause injury while you are using this medicine. Severe falls or injuries can cause unseen bleeding. Be careful when using sharp tools or knives. Consider using an electric razor. Take special care brushing or flossing your teeth. Report any injuries, bruising, or red spots on the skin to your doctor or health care professional.  What side effects may I notice from receiving this medicine?  Side effects that you should report to your doctor or health care professional as soon as possible:  -allergic reactions like skin rash, itching or hives, swelling of the face, lips, or tongue  -back pain  -redness, blistering, peeling or loosening of the skin, including inside the mouth  -signs and symptoms of bleeding such as bloody or black, tarry stools; red or dark-brown urine; spitting up blood or brown material that looks like coffee grounds; red spots on the skin; unusual bruising or bleeding from the eye, gums, or nose  Side effects that usually do not require medical attention (Report these to your doctor or health care professional if they continue or are bothersome.):  -dizziness  -muscle pain  This list may not describe all possible side effects. Call your doctor for medical advice about side effects. You may report side effects to FDA at 4-384-FDA-1648.  Where should I keep  my medicine?  Keep out of the reach of children.  Store at room temperature between 15 and 30 degrees C (59 and 86 degrees F). Throw away any unused medicine after the expiration date.  NOTE: This sheet is a summary. It may not cover all possible information. If you have questions about this medicine, talk to your doctor, pharmacist, or health care provider.     © 2016, Elsevier/Gold Standard. (2015-12-16 12:45:34)          Atrial Fibrillation  Atrial fibrillation is a type of heartbeat that is irregular or fast (rapid). If you have this condition, your heart keeps quivering in a weird (chaotic) way. This condition can make it so your heart cannot pump blood normally. Having this condition gives a person more risk for stroke, heart failure, and other heart problems. There are different types of atrial fibrillation. Talk with your doctor to learn about the type that you have.  HOME CARE  · Take over-the-counter and prescription medicines only as told by your doctor.  · If your doctor prescribed a blood-thinning medicine, take it exactly as told. Taking too much of it can cause bleeding. If you do not take enough of it, you will not have the protection that you need against stroke and other problems.  · Do not use any tobacco products. These include cigarettes, chewing tobacco, and e-cigarettes. If you need help quitting, ask your doctor.  · If you have apnea (obstructive sleep apnea), manage it as told by your doctor.  · Do not drink alcohol.  · Do not drink beverages that have caffeine. These include coffee, soda, and tea.  · Maintain a healthy weight. Do not use diet pills unless your doctor says they are safe for you. Diet pills may make heart problems worse.  · Follow diet instructions as told by your doctor.  · Exercise regularly as told by your doctor.  · Keep all follow-up visits as told by your doctor. This is important.  GET HELP IF:  · You notice a change in the speed, rhythm, or strength of your  heartbeat.  · You are taking a blood-thinning medicine and you notice more bruising.  · You get tired more easily when you move or exercise.  GET HELP RIGHT AWAY IF:  · You have pain in your chest or your belly (abdomen).  · You have sweating or weakness.  · You feel sick to your stomach (nauseous).  · You notice blood in your throw up (vomit), poop (stool), or pee (urine).  · You are short of breath.  · You suddenly have swollen feet and ankles.  · You feel dizzy.  · Your suddenly get weak or numb in your face, arms, or legs, especially if it happens on one side of your body.  · You have trouble talking, trouble understanding, or both.  · Your face or your eyelid droops on one side.  These symptoms may be an emergency. Do not wait to see if the symptoms will go away. Get medical help right away. Call your local emergency services (911 in the [place] Do not drive yourself to the hospital.     This information is not intended to replace advice given to you by your health care provider. Make sure you discuss any questions you have with your health care provider.     Document Released: 09/26/2009 Document Revised: 09/07/2016 Document Reviewed: 04/13/2016  [employer] Interactive Patient Education ©2016 [employer]            SYMPTOMS OF A STROKE    Call 911 or have someone take you to the Emergency Department if you have any of the following:    · Sudden numbness or weakness of your face, arm or leg especially on one side of the body  · Sudden confusion, diffiiculty speaking or trouble understanding   · Changes in your vision or loss of sight in one eye  · Sudden severe headache with no known cause  · sudden dizziness, trouble walking, loss of balance or coordination    It is important to seek emergency care right away if you have further stroke symptoms. If you get emergency help quickly, the powerful clot-dissolving medicines can reduce the disabilities caused by a stroke.     For more information:    American Stroke  Association  3-307-7-STROKE  www.strokeassociation.org           IF YOU SMOKE OR USE TOBACCO PLEASE READ THE FOLLOWING:    Why is smoking bad for me?  Smoking increases the risk of heart disease, lung disease, vascular disease, stroke, and cancer.     If you smoke, STOP!    If you would like more information on quitting smoking, please visit the [employer] website: www.[employer]dataoate/healthier-together/smoke   This link will provide additional resources including the QUIT line and the Beat the Pack support groups.     For more information:    American Cancer Society  (786) 180-1734    American Heart Association  1-228.687.7200               YOU ARE THE MOST IMPORTANT FACTOR IN YOUR RECOVERY.     Follow all instructions carefully.     I have reviewed my discharge instructions with my nurse, including the following information, if applicable:     Information about my illness and diagnosis   Follow up appointments (including lab draws)   Wound Care   Equipment Needs   Medications (new and continuing) along with side effects   Preventative information such as vaccines and smoking cessations   Diet   Pain   I know when to contact my Doctor's office or seek emergency care      I want my nurse to describe the side effects of my medications: YES NO   If the answer is no, I understand the side effects of my medications: YES NO   My nurse described the side effects of my medications in a way that I could understand: YES NO   I have taken my personal belongings and my own medications with me at discharge: YES NO            I have received this information and my questions have been answered. I have discussed any concerns I see with this plan with the nurse or physician. I understand these instructions.    Signature of Patient or Responsible Person: _____________________________________    Date: _________________  Time: __________________    Signature of Healthcare Provider:  _______________________________________  Date: _________________  Time: __________________

## 2017-01-19 NOTE — ED NOTES
Patient provided with boxed lunch and water to drink, with approval of IMER Gabriel, EDWIN, pending negative first troponin.      Elvira Peter RN  01/19/17 5719

## 2017-01-19 NOTE — ED NOTES
Charge nurse called reading room to obtain today's stress results on patient. Report not yet read.      Elvira Peter RN  01/19/17 8327

## 2017-01-19 NOTE — ED NOTES
Patient c/o chest pressure (1/10 at this time), shortness of breath, and fatigue off and on over the last month. Patient also has pitting edema to bilateral lower legs. Patient also complains of heart palpitations. Patient had tredmill stress test today here this morning. Patient states she had chest pressure during stress test, and it continued after discharge. Patient states office staff called her today after she was released and requested patient come to ER because staff felt as though patient was released too soon today. Patient states she was given lopressor today for heart rate while here this morning.      Elvira Peter RN  01/19/17 8137

## 2017-01-20 ENCOUNTER — APPOINTMENT (OUTPATIENT)
Dept: CARDIOLOGY | Facility: HOSPITAL | Age: 67
End: 2017-01-20
Attending: INTERNAL MEDICINE

## 2017-01-20 VITALS
HEIGHT: 65 IN | RESPIRATION RATE: 22 BRPM | OXYGEN SATURATION: 98 % | BODY MASS INDEX: 33.86 KG/M2 | TEMPERATURE: 97.7 F | HEART RATE: 71 BPM | DIASTOLIC BLOOD PRESSURE: 89 MMHG | SYSTOLIC BLOOD PRESSURE: 143 MMHG | WEIGHT: 203.25 LBS

## 2017-01-20 LAB
ANION GAP SERPL CALCULATED.3IONS-SCNC: 9 MMOL/L (ref 4–13)
BH CV ECHO MEAS - AO MAX PG (FULL): 10.4 MMHG
BH CV ECHO MEAS - AO MAX PG: 16.3 MMHG
BH CV ECHO MEAS - AO MEAN PG (FULL): 4 MMHG
BH CV ECHO MEAS - AO MEAN PG: 6 MMHG
BH CV ECHO MEAS - AO ROOT AREA (BSA CORRECTED): 1.3
BH CV ECHO MEAS - AO ROOT AREA: 5.3 CM^2
BH CV ECHO MEAS - AO ROOT DIAM: 2.6 CM
BH CV ECHO MEAS - AO V2 MAX: 202 CM/SEC
BH CV ECHO MEAS - AO V2 MEAN: 113 CM/SEC
BH CV ECHO MEAS - AO V2 VTI: 45 CM
BH CV ECHO MEAS - AVA(I,A): 2.3 CM^2
BH CV ECHO MEAS - AVA(I,D): 2.3 CM^2
BH CV ECHO MEAS - AVA(V,A): 1.9 CM^2
BH CV ECHO MEAS - AVA(V,D): 1.9 CM^2
BH CV ECHO MEAS - BSA(HAYCOCK): 2.1 M^2
BH CV ECHO MEAS - BSA: 2 M^2
BH CV ECHO MEAS - BZI_BMI: 34.8 KILOGRAMS/M^2
BH CV ECHO MEAS - BZI_METRIC_HEIGHT: 162.6 CM
BH CV ECHO MEAS - BZI_METRIC_WEIGHT: 92.1 KG
BH CV ECHO MEAS - CONTRAST EF 4CH: 67.7 ML/M^2
BH CV ECHO MEAS - EDV(CUBED): 125 ML
BH CV ECHO MEAS - EDV(MOD-SP4): 118 ML
BH CV ECHO MEAS - EDV(TEICH): 118.2 ML
BH CV ECHO MEAS - EF(CUBED): 71.3 %
BH CV ECHO MEAS - EF(MOD-SP4): 67.7 %
BH CV ECHO MEAS - EF(TEICH): 62.7 %
BH CV ECHO MEAS - ESV(CUBED): 35.9 ML
BH CV ECHO MEAS - ESV(MOD-SP4): 38.1 ML
BH CV ECHO MEAS - ESV(TEICH): 44.1 ML
BH CV ECHO MEAS - FS: 34 %
BH CV ECHO MEAS - IVS/LVPW: 1
BH CV ECHO MEAS - IVSD: 1.2 CM
BH CV ECHO MEAS - LA DIMENSION: 4 CM
BH CV ECHO MEAS - LA/AO: 1.5
BH CV ECHO MEAS - LAT PEAK E' VEL: 10.1 CM/SEC
BH CV ECHO MEAS - LV DIASTOLIC VOL/BSA (35-75): 59.9 ML/M^2
BH CV ECHO MEAS - LV MASS(C)D: 233.7 GRAMS
BH CV ECHO MEAS - LV MASS(C)DI: 118.7 GRAMS/M^2
BH CV ECHO MEAS - LV MAX PG: 6 MMHG
BH CV ECHO MEAS - LV MEAN PG: 2 MMHG
BH CV ECHO MEAS - LV SYSTOLIC VOL/BSA (12-30): 19.4 ML/M^2
BH CV ECHO MEAS - LV V1 MAX: 122 CM/SEC
BH CV ECHO MEAS - LV V1 MEAN: 65.7 CM/SEC
BH CV ECHO MEAS - LV V1 VTI: 33.1 CM
BH CV ECHO MEAS - LVIDD: 5 CM
BH CV ECHO MEAS - LVIDS: 3.3 CM
BH CV ECHO MEAS - LVLD AP4: 7.9 CM
BH CV ECHO MEAS - LVLS AP4: 5 CM
BH CV ECHO MEAS - LVOT AREA (M): 3.1 CM^2
BH CV ECHO MEAS - LVOT AREA: 3.1 CM^2
BH CV ECHO MEAS - LVOT DIAM: 2 CM
BH CV ECHO MEAS - LVPWD: 1.2 CM
BH CV ECHO MEAS - MED PEAK E' VEL: 9.9 CM/SEC
BH CV ECHO MEAS - MR ALIAS VEL: 30.8 CM/SEC
BH CV ECHO MEAS - MR ERO: 0.03 CM^2
BH CV ECHO MEAS - MR FLOW RATE: 17.4 CM^3/SEC
BH CV ECHO MEAS - MR MAX PG: 128.1 MMHG
BH CV ECHO MEAS - MR MAX VEL: 566 CM/SEC
BH CV ECHO MEAS - MR MEAN PG: 85 MMHG
BH CV ECHO MEAS - MR MEAN VEL: 428 CM/SEC
BH CV ECHO MEAS - MR PISA RADIUS: 0.3 CM
BH CV ECHO MEAS - MR PISA: 0.57 CM^2
BH CV ECHO MEAS - MR VOLUME: 6.9 ML
BH CV ECHO MEAS - MR VTI: 223 CM
BH CV ECHO MEAS - MV A MAX VEL: 64.5 CM/SEC
BH CV ECHO MEAS - MV DEC TIME: 0.25 SEC
BH CV ECHO MEAS - MV E MAX VEL: 88.8 CM/SEC
BH CV ECHO MEAS - MV E/A: 1.4
BH CV ECHO MEAS - RAP SYSTOLE: 10 MMHG
BH CV ECHO MEAS - RVSP: 41.6 MMHG
BH CV ECHO MEAS - SI(AO): 121.4 ML/M^2
BH CV ECHO MEAS - SI(CUBED): 45.2 ML/M^2
BH CV ECHO MEAS - SI(LVOT): 52.8 ML/M^2
BH CV ECHO MEAS - SI(MOD-SP4): 40.6 ML/M^2
BH CV ECHO MEAS - SI(TEICH): 37.6 ML/M^2
BH CV ECHO MEAS - SV(AO): 238.9 ML
BH CV ECHO MEAS - SV(CUBED): 89.1 ML
BH CV ECHO MEAS - SV(LVOT): 104 ML
BH CV ECHO MEAS - SV(MOD-SP4): 79.9 ML
BH CV ECHO MEAS - SV(TEICH): 74.1 ML
BH CV ECHO MEAS - TR MAX VEL: 281 CM/SEC
BUN BLD-MCNC: 17 MG/DL (ref 5–21)
BUN/CREAT SERPL: 22.4 (ref 7–25)
CALCIUM SPEC-SCNC: 8.4 MG/DL (ref 8.4–10.4)
CHLORIDE SERPL-SCNC: 105 MMOL/L (ref 98–110)
CO2 SERPL-SCNC: 28 MMOL/L (ref 24–31)
CREAT BLD-MCNC: 0.76 MG/DL (ref 0.5–1.4)
DEPRECATED RDW RBC AUTO: 42.9 FL (ref 40–54)
E/E' RATIO: 9
ERYTHROCYTE [DISTWIDTH] IN BLOOD BY AUTOMATED COUNT: 12.7 % (ref 12–15)
GFR SERPL CREATININE-BSD FRML MDRD: 76 ML/MIN/1.73
GLUCOSE BLD-MCNC: 125 MG/DL (ref 70–100)
HCT VFR BLD AUTO: 37.2 % (ref 37–47)
HGB BLD-MCNC: 12.5 G/DL (ref 12–16)
LEFT ATRIUM VOLUME INDEX: 24.2 ML/M2
LEFT ATRIUM VOLUME: 47.7 CM3
MAGNESIUM SERPL-MCNC: 2 MG/DL (ref 1.4–2.2)
MCH RBC QN AUTO: 31.1 PG (ref 28–32)
MCHC RBC AUTO-ENTMCNC: 33.6 G/DL (ref 33–36)
MCV RBC AUTO: 92.5 FL (ref 82–98)
PLATELET # BLD AUTO: 181 10*3/MM3 (ref 130–400)
PMV BLD AUTO: 10.9 FL (ref 6–12)
POTASSIUM BLD-SCNC: 3.7 MMOL/L (ref 3.5–5.3)
RBC # BLD AUTO: 4.02 10*6/MM3 (ref 4.2–5.4)
SODIUM BLD-SCNC: 142 MMOL/L (ref 135–145)
TROPONIN I SERPL-MCNC: <0.012 NG/ML (ref 0–0.03)
WBC NRBC COR # BLD: 6.94 10*3/MM3 (ref 4.8–10.8)

## 2017-01-20 PROCEDURE — 99222 1ST HOSP IP/OBS MODERATE 55: CPT | Performed by: INTERNAL MEDICINE

## 2017-01-20 PROCEDURE — 25010000002 ENOXAPARIN PER 10 MG: Performed by: INTERNAL MEDICINE

## 2017-01-20 PROCEDURE — 83735 ASSAY OF MAGNESIUM: CPT | Performed by: NURSE PRACTITIONER

## 2017-01-20 PROCEDURE — 93306 TTE W/DOPPLER COMPLETE: CPT

## 2017-01-20 PROCEDURE — 93306 TTE W/DOPPLER COMPLETE: CPT | Performed by: INTERNAL MEDICINE

## 2017-01-20 PROCEDURE — 84484 ASSAY OF TROPONIN QUANT: CPT | Performed by: INTERNAL MEDICINE

## 2017-01-20 PROCEDURE — 80048 BASIC METABOLIC PNL TOTAL CA: CPT | Performed by: INTERNAL MEDICINE

## 2017-01-20 PROCEDURE — 85027 COMPLETE CBC AUTOMATED: CPT | Performed by: INTERNAL MEDICINE

## 2017-01-20 RX ORDER — ASPIRIN 81 MG/1
81 TABLET ORAL DAILY
Status: DISCONTINUED | OUTPATIENT
Start: 2017-01-21 | End: 2017-01-20 | Stop reason: HOSPADM

## 2017-01-20 RX ADMIN — MAGNESIUM GLUCONATE 500 MG ORAL TABLET 400 MG: 500 TABLET ORAL at 08:42

## 2017-01-20 RX ADMIN — ENOXAPARIN SODIUM 90 MG: 100 INJECTION SUBCUTANEOUS at 08:43

## 2017-01-20 RX ADMIN — ENOXAPARIN SODIUM 90 MG: 100 INJECTION SUBCUTANEOUS at 00:37

## 2017-01-20 RX ADMIN — ASPIRIN 325 MG: 325 TABLET, COATED ORAL at 08:42

## 2017-01-20 NOTE — PLAN OF CARE
Problem: Acute Coronary Syndrome (ACS) (Adult)  Goal: Signs and Symptoms of Listed Potential Problems Will be Absent or Manageable (Acute Coronary Syndrome)  Outcome: Ongoing (interventions implemented as appropriate)    Problem: Patient Care Overview (Adult)  Goal: Plan of Care Review  Outcome: Ongoing (interventions implemented as appropriate)    01/20/17 0538   Coping/Psychosocial Response Interventions   Plan Of Care Reviewed With patient   Patient Care Overview   Progress improving   Outcome Evaluation   Outcome Summary/Follow up Plan No c/o of chest pain this shift. Pt remains in NSR with PVCs.       Goal: Adult Individualization and Mutuality  Outcome: Outcome(s) achieved Date Met:  01/20/17    Problem: Arrhythmia/Dysrhythmia (Symptomatic) (Adult)  Goal: Signs and Symptoms of Listed Potential Problems Will be Absent or Manageable (Arrhythmia/Dysrhythmia)  Outcome: Ongoing (interventions implemented as appropriate)

## 2017-01-20 NOTE — CONSULTS
[facility] CONSULT NOTE    Referring Provider: Rian Farah MD    Reason for Consultation: Atrial Fib    Chief complaint:   Chief Complaint   Patient presents with   • Chest Pain       Subjective .     History of present illness:  Terra Garcia is a 66 y.o. 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 Patient states she has experienced palpitations since her 20's however nothing as severe as the last month. Patient went to see her primary and she started a work up including labs and a stress test. Patient presented yesterday for a stress test for which her heart rate reportedly got in the 180's A-fib. Patient was taken to the emergency room. Patient's stress was read as low risk for ischemia. Patient converted to NSR overnight. Patient states when she has palpitations she becomes weak and at times has chest pain when her heart rate is elevated. No significant change in exercise tolerance. Patient does have a history of hypertension but with a 45 pound intentional weight loss she no longer required the medication.     Telemetry: NSR  EKG; Atrial Fib    History  Past Medical History   Diagnosis Date   • Arthritis    • Diabetes mellitus      Pre-Diabetic   • Dizziness    • Goiter    • Hyperlipidemia      Mixed   • Hypertension    • Thyroid nodule    • Tremor    ,   Past Surgical History   Procedure Laterality Date   • Tubal abdominal ligation     • Tonsillectomy     ,   Family History   Problem Relation Age of Onset   • Cancer Mother    • Heart disease Mother    • Cancer Father    ,   Social History   Substance Use Topics   • Smoking status: Never Smoker   • Smokeless tobacco: None   • Alcohol use Yes      Comment: one drink every 2 months   ,     Medications    Prior to Admission medications    Medication Sig Start Date End Date Taking? Authorizing Provider   aspirin 81 MG EC tablet Take 81 mg by mouth Daily. Delayed Release (DR/EC)    Yes  Historical Provider, MD   atorvastatin (LIPITOR) 20 MG tablet Take 20 mg by mouth Every Night. 11/17/16  Yes Historical Provider, MD   ATORVASTATIN CALCIUM PO Take 20 mg by mouth Every Night.   Yes Historical Provider, MD   estradiol (VAGIFEM) 10 MCG tablet vaginal tablet Insert 1 tablet into the vagina 2 (Two) Times a Week. Mondays and Fridays   Yes Historical Provider, MD   FLUTICASONE PROPIONATE, NASAL, NA 1 spray into each nostril 2 (Two) Times a Day.   Yes Historical Provider, MD   magnesium oxide (MAGOX) 400 (241.3 MG) MG tablet tablet Take 400 mg by mouth 3 (Three) Times a Day.   Yes Historical Provider, MD   Multiple Vitamins-Minerals (CENTRUM SILVER ULTRA WOMENS) tablet Take 1 tablet by mouth Daily.   Yes Historical Provider, MD   Omega-3 Fatty Acids (FISH OIL PO) Take 1,200 mg by mouth 3 (Three) Times a Day.   Yes Historical Provider, MD   omeprazole (PriLOSEC) 20 MG capsule Take 20 mg by mouth 2 (Two) Times a Day. Delayed Release (DR/EC)  Take 1 capsule by oral route 2 times a day  7/17/13  Yes Historical Provider, MD   fluticasone (FLONASE) 50 MCG/ACT nasal spray 1 spray into each nostril 2 (Two) Times a Day. 11/14/16   Historical Provider, MD   olopatadine (PATANASE) 0.6 % solution nasal solution 2 sprays by Each Nare route 2 (two) times a day. 9/14/16   PORTER Read       Current Facility-Administered Medications   Medication Dose Route Frequency Provider Last Rate Last Dose   • aspirin EC tablet 325 mg  325 mg Oral Daily Gemini Mccoy MD   325 mg at 01/20/17 0842   • azelastine (ASTELIN) nasal spray 2 spray  2 spray Each Nare Daily Gemini Mccoy MD   2 spray at 01/20/17 0844   • enoxaparin (LOVENOX) syringe 90 mg  1 mg/kg Subcutaneous Q12H Gemini Mccoy MD   90 mg at 01/20/17 0843   • magnesium oxide (MAGOX) tablet 400 mg  400 mg Oral Daily Gemini Mccoy MD   400 mg at 01/20/17 0842   • metoprolol tartrate (LOPRESSOR) injection 5 mg  5 mg Intravenous Q4H PRN Gemini Mccoy MD       • Morphine  "sulfate (PF) injection 1 mg  1 mg Intravenous Q2H PRN Gemini Mccoy MD       • nitroglycerin (NITROSTAT) SL tablet 0.4 mg  0.4 mg Sublingual Q5 Min PRN Gemini Mccoy MD       • pantoprazole (PROTONIX) EC tablet 40 mg  40 mg Oral Q AM Gemini Mccoy MD   40 mg at 01/20/17 0658   • pneumococcal polysaccharide 23-valent (PNEUMOVAX-23) vaccine 0.5 mL  0.5 mL Intramuscular During Hospitalization Gemini Mccoy MD           Allergies:  Streptomycin and Terramycin [oxytetracycline]    Review of Systems  Review of Systems   Constitution: Positive for malaise/fatigue. Negative for fever and weakness.   HENT: Negative for congestion.    Eyes: Negative for visual disturbance.   Cardiovascular: Positive for chest pain and palpitations. Negative for dyspnea on exertion, leg swelling, near-syncope, orthopnea and paroxysmal nocturnal dyspnea.   Respiratory: Negative for cough and shortness of breath.    Endocrine: Negative for cold intolerance and heat intolerance.   Hematologic/Lymphatic: Negative for bleeding problem. Does not bruise/bleed easily.   Skin: Negative for rash.   Musculoskeletal: Positive for joint pain. Negative for back pain and falls.   Gastrointestinal: Negative for abdominal pain, diarrhea, dysphagia, melena, nausea and vomiting.   Genitourinary: Negative for dysuria and frequency.   Neurological: Negative for dizziness and light-headedness.   Psychiatric/Behavioral: Negative for altered mental status.   Allergic/Immunologic: Negative for persistent infections.       Objective     Physical Exam:  Patient Vitals for the past 24 hrs:   BP Temp Temp src Pulse Resp SpO2 Height Weight   01/20/17 0700 144/65 96.8 °F (36 °C) Temporal Art 65 22 98 % - -   01/20/17 0344 119/52 97.4 °F (36.3 °C) Temporal Art 71 20 97 % - -   01/19/17 2347 144/78 96.9 °F (36.1 °C) Temporal Art 68 20 98 % - -   01/19/17 2030 134/68 96.9 °F (36.1 °C) Temporal Art 78 18 96 % 64.5\" (163.8 cm) 203 lb 4 oz (92.2 kg)   01/19/17 1947 124/62 98.1 °F " "(36.7 °C) Oral 70 18 94 % - -   01/19/17 1910 - - - 96 - 93 % - -   01/19/17 1903 - - - 92 - 96 % - -   01/19/17 1901 145/69 - - 102 - 95 % - -   01/19/17 1853 - - - 96 - 94 % - -   01/19/17 1846 145/70 - - 95 - 94 % - -   01/19/17 1839 - - - 88 - 94 % - -   01/19/17 1832 143/59 - - 107 - 93 % - -   01/19/17 1817 133/68 - - 93 - 99 % - -   01/19/17 1804 124/63 - - 90 - 95 % - -   01/19/17 1802 137/70 - - 98 - 96 % - -   01/19/17 1757 - - - 100 - 98 % - -   01/19/17 1750 - - - 104 - 95 % - -   01/19/17 1747 143/53 - - 99 - 96 % - -   01/19/17 1732 126/60 - - 69 - 96 % - -   01/19/17 1717 140/50 - - 76 - 95 % - -   01/19/17 1701 131/52 - - 69 - 96 % - -   01/19/17 1647 131/53 - - 69 - 95 % - -   01/19/17 1632 126/60 - - 70 - 94 % - -   01/19/17 1617 120/54 - - 71 - 96 % - -   01/19/17 1602 125/59 - - 76 - 96 % - -   01/19/17 1547 118/54 - - 71 - 96 % - -   01/19/17 1532 134/52 - - 75 - 96 % - -   01/19/17 1516 139/86 - - 75 - 96 % - -   01/19/17 1502 137/65 - - 70 - 96 % - -   01/19/17 1453 - - - 67 - 94 % - -   01/19/17 1447 147/73 - - 69 - 93 % - -   01/19/17 1439 - - - 68 - 98 % - -   01/19/17 1435 - - - 71 - 99 % - -   01/19/17 1432 152/84 - - 68 - 98 % - -   01/19/17 1423 - - - 72 - 98 % - -   01/19/17 1417 134/61 - - 69 - 96 % - -   01/19/17 1413 135/60 - - 69 - 95 % - -   01/19/17 1357 136/83 97.5 °F (36.4 °C) Temporal Art 74 16 97 % 64\" (162.6 cm) 200 lb (90.7 kg)     Physical Exam   Constitutional: She is oriented to person, place, and time. She appears well-developed and well-nourished.   HENT:   Head: Normocephalic and atraumatic.   Eyes: Pupils are equal, round, and reactive to light.   Neck: Normal range of motion. Neck supple. No JVD present. Carotid bruit is not present.   Cardiovascular: Normal rate, regular rhythm, normal heart sounds and intact distal pulses.    Pulmonary/Chest: Effort normal and breath sounds normal.   Abdominal: Soft. Bowel sounds are normal.   Musculoskeletal: Normal range of " motion.   Neurological: She is alert and oriented to person, place, and time. She has normal reflexes.   Skin: Skin is warm and dry.   Psychiatric: She has a normal mood and affect. Her behavior is normal. Judgment and thought content normal.       Results Review:   I reviewed the patient's new clinical results.  Lab Results (last 24 hours)     Procedure Component Value Units Date/Time    CBC & Differential [[id number]] Collected:  01/19/17 1420    Specimen:  Blood Updated:  01/19/17 1643    Narrative:       The following orders were created for panel order CBC & Differential.  Procedure                               Abnormality         Status                     ---------                               -----------         ------                     CBC Auto Differential[[id number]]         Normal              Final result                 Please view results for these tests on the individual orders.    Comprehensive Metabolic Panel [[id number]] Collected:  01/19/17 1420    Specimen:  Blood from Arm, Right Updated:  01/19/17 1518     Glucose 97 mg/dL      BUN 15 mg/dL      Creatinine 0.72 mg/dL      Sodium 141 mmol/L      Potassium 4.1 mmol/L      Chloride 101 mmol/L      CO2 30.0 mmol/L      Calcium 8.8 mg/dL      Total Protein 6.9 g/dL      Albumin 4.00 g/dL      ALT (SGPT) 36 U/L      AST (SGOT) 30 U/L      Alkaline Phosphatase 53 U/L      Total Bilirubin 0.7 mg/dL      eGFR Non African Amer 81 mL/min/1.73      Globulin 2.9 gm/dL      A/G Ratio 1.4 g/dL      BUN/Creatinine Ratio 20.8      Anion Gap 10.0 mmol/L     D-dimer, Quantitative [[id number]]  (Normal) Collected:  01/19/17 1420    Specimen:  Blood from Arm, Right Updated:  01/19/17 1537     D-Dimer, Quantitative 0.27 mg/L (FEU)     Narrative:       Reference Range is 0-0.50 mg/L FEU. However, results <0.50 mg/L FEU tends to rule out DVT or PE. Results >0.50 mg/L FEU are not useful in predicting absence or presence of DVT or PE.    BNP [[id number]]  (Normal) Collected:   01/19/17 1420    Specimen:  Blood from Arm, Right Updated:  01/19/17 1526     proBNP 301.0 pg/mL     TSH [[id number]]  (Normal) Collected:  01/19/17 1420    Specimen:  Blood from Arm, Right Updated:  01/19/17 1549     TSH 1.080 mIU/mL     T3, Free [[id number]]  (Normal) Collected:  01/19/17 1420    Specimen:  Blood from Arm, Right Updated:  01/19/17 1536     T3, Free 3.56 pg/mL     T4, Free [[id number]]  (Normal) Collected:  01/19/17 1420    Specimen:  Blood from Arm, Right Updated:  01/19/17 1536     Free T4 0.94 ng/dL     CBC Auto Differential [[id number]]  (Normal) Collected:  01/19/17 1420    Specimen:  Blood from Arm, Right Updated:  01/19/17 1643     WBC 6.75 10*3/mm3      RBC 4.43 10*6/mm3      Hemoglobin 13.8 g/dL      Hematocrit 40.8 %      MCV 92.1 fL      MCH 31.2 pg      MCHC 33.8 g/dL      RDW 12.6 %      RDW-SD 42.3 fl      MPV 11.1 fL      Platelets 209 10*3/mm3      Neutrophil % 60.8 %      Lymphocyte % 30.7 %      Monocyte % 6.1 %      Eosinophil % 1.8 %      Basophil % 0.3 %      Immature Grans % 0.3 %      Neutrophils, Absolute 4.11 10*3/mm3      Lymphocytes, Absolute 2.07 10*3/mm3      Monocytes, Absolute 0.41 10*3/mm3      Eosinophils, Absolute 0.12 10*3/mm3      Basophils, Absolute 0.02 10*3/mm3      Immature Grans, Absolute 0.02 10*3/mm3     POC Troponin, Rapid [[id number]]  (Normal) Collected:  01/19/17 1436    Specimen:  Blood Updated:  01/19/17 1453     Troponin I 0.00 ng/mL       Serial Number: [id number]    : [id number]       POC Troponin, Rapid [[id number]]  (Normal) Collected:  01/19/17 1750    Specimen:  Blood Updated:  01/19/17 1806     Troponin I 0.00 ng/mL       Serial Number: [id number]    : [id number]       aPTT [[id number]]  (Normal) Collected:  01/19/17 2145    Specimen:  Blood Updated:  01/19/17 2221     PTT 25.4 seconds     Protime-INR [[id number]]  (Normal) Collected:  01/19/17 2145    Specimen:  Blood Updated:  01/19/17 2221     Protime 12.8 Seconds      INR 0.94     Troponin [[id number]]   (Normal) Collected:  01/19/17 2145    Specimen:  Blood Updated:  01/19/17 2238     Troponin I <0.012 ng/mL     Troponin [[id number]]  (Normal) Collected:  01/20/17 0003    Specimen:  Blood Updated:  01/20/17 0127     Troponin I <0.012 ng/mL     Basic Metabolic Panel [[id number]]  (Abnormal) Collected:  01/20/17 0003    Specimen:  Blood Updated:  01/20/17 0116     Glucose 125 (H) mg/dL      BUN 17 mg/dL      Creatinine 0.76 mg/dL      Sodium 142 mmol/L      Potassium 3.7 mmol/L      Chloride 105 mmol/L      CO2 28.0 mmol/L      Calcium 8.4 mg/dL      eGFR Non African Amer 76 mL/min/1.73      BUN/Creatinine Ratio 22.4      Anion Gap 9.0 mmol/L     Narrative:       GFR Normal >60  Chronic Kidney Disease <60  Kidney Failure <15    CBC (No Diff) [[id number]]  (Abnormal) Collected:  01/20/17 0003    Specimen:  Blood Updated:  01/20/17 0100     WBC 6.94 10*3/mm3      RBC 4.02 (L) 10*6/mm3      Hemoglobin 12.5 g/dL      Hematocrit 37.2 %      MCV 92.5 fL      MCH 31.1 pg      MCHC 33.6 g/dL      RDW 12.7 %      RDW-SD 42.9 fl      MPV 10.9 fL      Platelets 181 10*3/mm3     Troponin [[id number]]  (Normal) Collected:  01/20/17 0223    Specimen:  Blood Updated:  01/20/17 0300     Troponin I <0.012 ng/mL     Troponin [[id number]]  (Normal) Collected:  01/20/17 0558    Specimen:  Blood Updated:  01/20/17 0636     Troponin I <0.012 ng/mL           Lab Results (last 72 hours)     Procedure Component Value Units Date/Time    POC Troponin, Rapid [[id number]]  (Normal) Collected:  01/19/17 1436    Specimen:  Blood Updated:  01/19/17 1453     Troponin I 0.00 ng/mL       Serial Number: [id number]    : [id number]       Comprehensive Metabolic Panel [[id number]] Collected:  01/19/17 1420    Specimen:  Blood from Arm, Right Updated:  01/19/17 1518     Glucose 97 mg/dL      BUN 15 mg/dL      Creatinine 0.72 mg/dL      Sodium 141 mmol/L      Potassium 4.1 mmol/L      Chloride 101 mmol/L      CO2 30.0 mmol/L      Calcium 8.8 mg/dL      Total Protein  6.9 g/dL      Albumin 4.00 g/dL      ALT (SGPT) 36 U/L      AST (SGOT) 30 U/L      Alkaline Phosphatase 53 U/L      Total Bilirubin 0.7 mg/dL      eGFR Non African Amer 81 mL/min/1.73      Globulin 2.9 gm/dL      A/G Ratio 1.4 g/dL      BUN/Creatinine Ratio 20.8      Anion Gap 10.0 mmol/L     BNP [[id number]]  (Normal) Collected:  01/19/17 1420    Specimen:  Blood from Arm, Right Updated:  01/19/17 1526     proBNP 301.0 pg/mL     T3, Free [[id number]]  (Normal) Collected:  01/19/17 1420    Specimen:  Blood from Arm, Right Updated:  01/19/17 1536     T3, Free 3.56 pg/mL     T4, Free [[id number]]  (Normal) Collected:  01/19/17 1420    Specimen:  Blood from Arm, Right Updated:  01/19/17 1536     Free T4 0.94 ng/dL     D-dimer, Quantitative [[id number]]  (Normal) Collected:  01/19/17 1420    Specimen:  Blood from Arm, Right Updated:  01/19/17 1537     D-Dimer, Quantitative 0.27 mg/L (FEU)     Narrative:       Reference Range is 0-0.50 mg/L FEU. However, results <0.50 mg/L FEU tends to rule out DVT or PE. Results >0.50 mg/L FEU are not useful in predicting absence or presence of DVT or PE.    TSH [[id number]]  (Normal) Collected:  01/19/17 1420    Specimen:  Blood from Arm, Right Updated:  01/19/17 1549     TSH 1.080 mIU/mL     CBC & Differential [[id number]] Collected:  01/19/17 1420    Specimen:  Blood Updated:  01/19/17 1643    Narrative:       The following orders were created for panel order CBC & Differential.  Procedure                               Abnormality         Status                     ---------                               -----------         ------                     CBC Auto Differential[[id number]]         Normal              Final result                 Please view results for these tests on the individual orders.    CBC Auto Differential [[id number]]  (Normal) Collected:  01/19/17 1420    Specimen:  Blood from Arm, Right Updated:  01/19/17 1643     WBC 6.75 10*3/mm3      RBC 4.43 10*6/mm3      Hemoglobin 13.8  g/dL      Hematocrit 40.8 %      MCV 92.1 fL      MCH 31.2 pg      MCHC 33.8 g/dL      RDW 12.6 %      RDW-SD 42.3 fl      MPV 11.1 fL      Platelets 209 10*3/mm3      Neutrophil % 60.8 %      Lymphocyte % 30.7 %      Monocyte % 6.1 %      Eosinophil % 1.8 %      Basophil % 0.3 %      Immature Grans % 0.3 %      Neutrophils, Absolute 4.11 10*3/mm3      Lymphocytes, Absolute 2.07 10*3/mm3      Monocytes, Absolute 0.41 10*3/mm3      Eosinophils, Absolute 0.12 10*3/mm3      Basophils, Absolute 0.02 10*3/mm3      Immature Grans, Absolute 0.02 10*3/mm3     POC Troponin, Rapid [[id number]]  (Normal) Collected:  01/19/17 1750    Specimen:  Blood Updated:  01/19/17 1806     Troponin I 0.00 ng/mL       Serial Number: [id number]    : [id number]       aPTT [[id number]]  (Normal) Collected:  01/19/17 2145    Specimen:  Blood Updated:  01/19/17 2221     PTT 25.4 seconds     Protime-INR [[id number]]  (Normal) Collected:  01/19/17 2145    Specimen:  Blood Updated:  01/19/17 2221     Protime 12.8 Seconds      INR 0.94     Troponin [[id number]]  (Normal) Collected:  01/19/17 2145    Specimen:  Blood Updated:  01/19/17 2238     Troponin I <0.012 ng/mL     CBC (No Diff) [[id number]]  (Abnormal) Collected:  01/20/17 0003    Specimen:  Blood Updated:  01/20/17 0100     WBC 6.94 10*3/mm3      RBC 4.02 (L) 10*6/mm3      Hemoglobin 12.5 g/dL      Hematocrit 37.2 %      MCV 92.5 fL      MCH 31.1 pg      MCHC 33.6 g/dL      RDW 12.7 %      RDW-SD 42.9 fl      MPV 10.9 fL      Platelets 181 10*3/mm3     Basic Metabolic Panel [[id number]]  (Abnormal) Collected:  01/20/17 0003    Specimen:  Blood Updated:  01/20/17 0116     Glucose 125 (H) mg/dL      BUN 17 mg/dL      Creatinine 0.76 mg/dL      Sodium 142 mmol/L      Potassium 3.7 mmol/L      Chloride 105 mmol/L      CO2 28.0 mmol/L      Calcium 8.4 mg/dL      eGFR Non African Amer 76 mL/min/1.73      BUN/Creatinine Ratio 22.4      Anion Gap 9.0 mmol/L     Narrative:       GFR Normal >60  Chronic Kidney  Disease <60  Kidney Failure <15    Troponin [[id number]]  (Normal) Collected:  01/20/17 0003    Specimen:  Blood Updated:  01/20/17 0127     Troponin I <0.012 ng/mL     Troponin [[id number]]  (Normal) Collected:  01/20/17 0223    Specimen:  Blood Updated:  01/20/17 0300     Troponin I <0.012 ng/mL     Troponin [[id number]]  (Normal) Collected:  01/20/17 0558    Specimen:  Blood Updated:  01/20/17 0636     Troponin I <0.012 ng/mL           No results found for: ECHOEFEST    Imaging Results (last 72 hours)     Procedure Component Value Units Date/Time    XR Chest 1 View [[id number]] Collected:  01/19/17 1455     Updated:  01/19/17 1457    Narrative:       EXAMINATION:   XR CHEST 1 VW-  1/19/2017 2:55 PM CST     HISTORY: Chest pain     Single view of the chest is obtained. The lungs are clear. The cardiac  silhouette is normal. The pleural surfaces are unremarkable.       Impression:       Negative single view chest.     Electronically Signed By-Dr. Toby Goetz MD On:1/19/2017 3:55 PM EST  This report was finalized on 01/19/2017 14:55 by Dr. Toby Goetz MD.            Assessment/Plan     Active Problems:    Paroxysmal atrial fibrillation    Plan  Check Magnesium  Results of Echo Pending  Defer rate controlling/antiarrhythmic drugs to Dr. Andrews  Patient currently on full dose Lovenox, will defer anticoagulant to Dr. Andrews  Continue Tele    Further orders per Dr. Andrews    Thank you for asking us to follow this patient with you.     PORTER Hammer  01/20/17  9:05 AM

## 2017-01-20 NOTE — H&P
[facility] Medicine Services  HISTORY AND PHYSICAL    Date of Admission: 1/19/2017  Primary Care Physician: PORTER Brennan    Subjective     Chief Complaint: Chest pain and palpitations (patient seen 8:54 PM on 1/19/17)  History is obtained from patient, ER staff, ER notes, and available medical records    History of Present Illness  The patient is a pleasant 66-year-old  female who for at least a decade has been having intermittent irregular heartbeat.  Apparently he was not particularly bothersome to her.  Over the last month however, she has been having palpitations, with intermittent chest pain.  The chest pain is more of a dull pressure that is the left part of her chest.  It does not radiate.  It lasted only a couple seconds, and happens up to maybe 4 times a day.  She cannot recall any aggravating or alleviating factors.  The discomfort does not radiate.    Because of this, she was sent for an outpatient stress test today.  Interestingly, her pre-stress test EKG done at 1:48 in the afternoon shows sinus rhythm with only T-wave inversion in lead 3.  During her exercise stress test, her chest discomfort became constant and her heart rate shot up to 180s atrial fibrillation.  Apparently she received some Lopressor whilst there and was discharged.  Staffed called her family doctor, who assured her to go to the ER.  After the patient was discharged from her stress test, she did have some shortness of breath.  In fact, she does feel short of breath with these episodes.  However, she does not endorse any orthopnea or humberto dyspnea on exertion.  She is not endorsing any weight changes that are unexpected nor any leg swelling.    EKG the ER showed atrial fibrillation at 95 bpm with no acute changes.  Troponin testing was negative thus far.  We will going to put on a Cardizem drip, but she has been rate controlled, so this was held for now.    Review of Systems    She has chronic leg cramping which is helped with mag ox supplementation.  Otherwise complete ROS reviewed and negative except as mentioned in the HPI    Past Medical History:   Past Medical History   Diagnosis Date   • Arthritis    • Diabetes mellitus      Pre-Diabetic   • Dizziness    • Goiter    • Hyperlipidemia      Mixed   • Hypertension    • Thyroid nodule    • Tremor     scoliosis, degenerative disc disease    Past Surgical History:  Past Surgical History   Procedure Laterality Date   • Tubal abdominal ligation     • Tonsillectomy         Social History:  reports that she has never smoked. She does not have any smokeless tobacco history on file. She reports that she drinks alcohol. Drug use questions deferred to the physician.    Family History: family history includes Cancer in her father and mother; Heart disease in her mother. uterine cancer, renal cancer, hypertension, renal disease, Alzheimer's disease, heart disease, atrial fibrillation, abdominal aortic aneurysm    Allergies:  Allergies   Allergen Reactions   • Streptomycin    • Terramycin [Oxytetracycline]        Medications:  Prior to Admission medications    Medication Sig Start Date End Date Taking? Authorizing Provider   aspirin 81 MG EC tablet Take  by mouth. Delayed Release (DR/EC)   Yes Historical Provider, MD   ATORVASTATIN CALCIUM PO Take  by mouth.   Yes Historical Provider, MD   magnesium oxide (MAGOX) 400 (241.3 MG) MG tablet tablet Take 400 mg by mouth daily.   Yes Historical Provider, MD   olopatadine (PATANASE) 0.6 % solution nasal solution 2 sprays by Each Nare route 2 (two) times a day. 9/14/16  Yes PORTER Read   Omega-3 Fatty Acids (FISH OIL PO) Take  by mouth.   Yes Historical Provider, MD   omeprazole (PriLOSEC) 20 MG capsule Delayed Release (DR/EC)  Take 1 capsule by oral route 2 times a day 7/17/13  Yes Historical Provider, MD       Objective     Vital Signs:   Visit Vitals   • /62 (BP Location: Right arm, Patient  "Position: Lying)   • Pulse 70   • Temp 98.1 °F (36.7 °C) (Oral)   • Resp 18   • Ht 64\" (162.6 cm)   • Wt 200 lb (90.7 kg)   • SpO2 94%   • BMI 34.33 kg/m2     Physical Exam  Gen.: Patient awake, alert, no distress  HEENT: No scleral icterus, normal pinna and hearing, moist oral mucosa  Neck: No JVD or retractions  Heart: S1-S2, no murmur, gallop, or rub  Lungs: Clear to auscultation bilaterally, normal respiratory effort  Abdomen: Soft, nontender and nondistended, positive bowel sounds, no mass  Extremities: No edema  Derm: No rashes, diaphoresis, or jaundice  Musculoskeletal: Normal strength and tone in all 4 extremities  Psychiatric: Patient's judgment, affect, insight intact        Results Reviewed:  Lab Results (last 24 hours)     Procedure Component Value Units Date/Time    POC Troponin, Rapid [[id number]]  (Normal) Collected:  01/19/17 1436    Specimen:  Blood Updated:  01/19/17 1453     Troponin I 0.00 ng/mL       Serial Number: [id number]    : [id number]       Comprehensive Metabolic Panel [[id number]] Collected:  01/19/17 1420    Specimen:  Blood from Arm, Right Updated:  01/19/17 1518     Glucose 97 mg/dL      BUN 15 mg/dL      Creatinine 0.72 mg/dL      Sodium 141 mmol/L      Potassium 4.1 mmol/L      Chloride 101 mmol/L      CO2 30.0 mmol/L      Calcium 8.8 mg/dL      Total Protein 6.9 g/dL      Albumin 4.00 g/dL      ALT (SGPT) 36 U/L      AST (SGOT) 30 U/L      Alkaline Phosphatase 53 U/L      Total Bilirubin 0.7 mg/dL      eGFR Non African Amer 81 mL/min/1.73      Globulin 2.9 gm/dL      A/G Ratio 1.4 g/dL      BUN/Creatinine Ratio 20.8      Anion Gap 10.0 mmol/L     BNP [[id number]]  (Normal) Collected:  01/19/17 1420    Specimen:  Blood from Arm, Right Updated:  01/19/17 1526     proBNP 301.0 pg/mL     T3, Free [[id number]]  (Normal) Collected:  01/19/17 1420    Specimen:  Blood from Arm, Right Updated:  01/19/17 1536     T3, Free 3.56 pg/mL     T4, Free [[id number]]  (Normal) Collected:  01/19/17 1420    " Specimen:  Blood from Arm, Right Updated:  01/19/17 1536     Free T4 0.94 ng/dL     D-dimer, Quantitative [[id number]]  (Normal) Collected:  01/19/17 1420    Specimen:  Blood from Arm, Right Updated:  01/19/17 1537     D-Dimer, Quantitative 0.27 mg/L (FEU)     Narrative:       Reference Range is 0-0.50 mg/L FEU. However, results <0.50 mg/L FEU tends to rule out DVT or PE. Results >0.50 mg/L FEU are not useful in predicting absence or presence of DVT or PE.    TSH [[id number]]  (Normal) Collected:  01/19/17 1420    Specimen:  Blood from Arm, Right Updated:  01/19/17 1549     TSH 1.080 mIU/mL         CBC Auto Differential [[id number]]  (Normal) Collected:  01/19/17 1420    Specimen:  Blood from Arm, Right Updated:  01/19/17 1643     WBC 6.75 10*3/mm3      RBC 4.43 10*6/mm3      Hemoglobin 13.8 g/dL      Hematocrit 40.8 %      MCV 92.1 fL      MCH 31.2 pg      MCHC 33.8 g/dL      RDW 12.6 %      RDW-SD 42.3 fl      MPV 11.1 fL      Platelets 209 10*3/mm3      Neutrophil % 60.8 %      Lymphocyte % 30.7 %      Monocyte % 6.1 %      Eosinophil % 1.8 %      Basophil % 0.3 %      Immature Grans % 0.3 %      Neutrophils, Absolute 4.11 10*3/mm3      Lymphocytes, Absolute 2.07 10*3/mm3      Monocytes, Absolute 0.41 10*3/mm3      Eosinophils, Absolute 0.12 10*3/mm3      Basophils, Absolute 0.02 10*3/mm3      Immature Grans, Absolute 0.02 10*3/mm3     POC Troponin, Rapid [[id number]]  (Normal) Collected:  01/19/17 1750    Specimen:  Blood Updated:  01/19/17 1806     Troponin I 0.00 ng/mL       Serial Number: [id number]    : [id number]           Imaging Results (last 24 hours)     Procedure Component Value Units Date/Time    XR Chest 1 View [[id number]] Collected:  01/19/17 1455     Updated:  01/19/17 1457    Narrative:       EXAMINATION:   XR CHEST 1 VW-  1/19/2017 2:55 PM CST     HISTORY: Chest pain     Single view of the chest is obtained. The lungs are clear. The cardiac  silhouette is normal. The pleural surfaces are unremarkable.    "    Impression:       Negative single view chest.     Electronically Signed By-Dr. Toby Goetz MD On:1/19/2017 3:55 PM EST  This report was finalized on 01/19/2017 14:55 by Dr. Toby Goetz MD.        EKG-previously discussed in HPI    Assessment / Plan     Assessment & Plan  Hospital Problem List     Paroxysmal atrial fibrillation       1.  \"Newly diagnosed\" atrial fibrillation-I suspect this is been going on for some time, and this only now becoming clinically apparent, especially after stress testing.  She has been rate controlled and does not require Cardizem, but we will start cardizem drip if needed.  I will go ahead and anticoagulate with Lovenox.  Check serial troponins.  Check a standard 2-D echo, with cardiology to see.  Monitor on telemetry.  Thyroid testing is negative.    2.chest pain-suspicious for angina.  She status post aspirin which we will continue.  Lovenox, serial troponins, and cardiology evaluation.      Gemini Mccoy MD   01/19/17   8:54 PM          "

## 2017-01-20 NOTE — DISCHARGE INSTR - ACTIVITY
Keep BP and HR diary to assist with OP management  Watch for any S/SX of bleeding, call MD is this occurs.

## 2017-01-20 NOTE — DISCHARGE SUMMARY
[facility] Medicine Services  DISCHARGE SUMMARY       Date of Admission: 1/19/2017  Date of Discharge:  1/20/2017  Primary Care Physician: PORTER Brennan    Discharge Diagnoses:  Hospital Problem List     Paroxysmal atrial fibrillation          Procedures Performed:   Stress test:  · Normal baseline ECG noted.  · The patient reported shortness of breath during the stress test. The patient experienced no angina during the stress test.  · Stress ECG rhythm of atrial fibrillation noted.  · Upward-sloping ST segment depression of 2 mm was noted during stress in the inferolateral leads (II, III, aVF, V5 and V6), while in atrial fibrillation with RVR.  · Segment augmentation had a normal response to stress.  · No significant echocardiographic evidence for myocardial ischemia - likely low risk of ischemia, given no chest pain and normal wall motion.    Echo:  NORMAL LV AND RV SIZE AND FUNCTION  BORDERLINE PULMONARY HTN  NO SIGNIFICANT VALVULAR DYSFUNCTION  MILD MR NOTED    Pertinent Test Results:   Lab Results (last 24 hours)     Procedure Component Value Units Date/Time    POC Troponin, Rapid [[id number]]  (Normal) Collected:  01/19/17 1750    Specimen:  Blood Updated:  01/19/17 1806     Troponin I 0.00 ng/mL       Serial Number: [id number]    : [id number]       aPTT [[id number]]  (Normal) Collected:  01/19/17 2145    Specimen:  Blood Updated:  01/19/17 2221     PTT 25.4 seconds     Protime-INR [[id number]]  (Normal) Collected:  01/19/17 2145    Specimen:  Blood Updated:  01/19/17 2221     Protime 12.8 Seconds      INR 0.94     Troponin [[id number]]  (Normal) Collected:  01/19/17 2145    Specimen:  Blood Updated:  01/19/17 2238     Troponin I <0.012 ng/mL     CBC (No Diff) [[id number]]  (Abnormal) Collected:  01/20/17 0003    Specimen:  Blood Updated:  01/20/17 0100     WBC 6.94 10*3/mm3      RBC 4.02 (L) 10*6/mm3      Hemoglobin 12.5 g/dL      Hematocrit 37.2 %      MCV 92.5 fL       "MCH 31.1 pg      MCHC 33.6 g/dL      RDW 12.7 %      RDW-SD 42.9 fl      MPV 10.9 fL      Platelets 181 10*3/mm3     Basic Metabolic Panel [[id number]]  (Abnormal) Collected:  01/20/17 0003    Specimen:  Blood Updated:  01/20/17 0116     Glucose 125 (H) mg/dL      BUN 17 mg/dL      Creatinine 0.76 mg/dL      Sodium 142 mmol/L      Potassium 3.7 mmol/L      Chloride 105 mmol/L      CO2 28.0 mmol/L      Calcium 8.4 mg/dL      eGFR Non African Amer 76 mL/min/1.73      BUN/Creatinine Ratio 22.4      Anion Gap 9.0 mmol/L     Narrative:       GFR Normal >60  Chronic Kidney Disease <60  Kidney Failure <15    Troponin [[id number]]  (Normal) Collected:  01/20/17 0003    Specimen:  Blood Updated:  01/20/17 0127     Troponin I <0.012 ng/mL     Troponin [[id number]]  (Normal) Collected:  01/20/17 0223    Specimen:  Blood Updated:  01/20/17 0300     Troponin I <0.012 ng/mL     Troponin [[id number]]  (Normal) Collected:  01/20/17 0558    Specimen:  Blood Updated:  01/20/17 0636     Troponin I <0.012 ng/mL     Magnesium [[id number]]  (Normal) Collected:  01/20/17 0934    Specimen:  Blood Updated:  01/20/17 1025     Magnesium 2.0 mg/dL       Specimen hemolyzed.  Results may be affected.               Consults:  Cardiology    Chief Complaint on Day of Discharge: None. \"I am ready to go home, I don't want to be in this place over the weekend.\"    Hospital Course  Patient is a 66 y.o. female presented with SOA and PAFIB. SP stress test, which showed low probability. Patient rate improved. Was anticoagulated with lovenox. Attempted to dicuss with cardiology. Secondary to PAF will add xarelto and lopressor to try and maintain low HR and mild improvement of BP control. Discussed with patient.     Condition on Discharge:  Stale, CP free. No SOA.    Physical Exam on Discharge:  Visit Vitals   • /89 (BP Location: Left arm, Patient Position: Sitting)   • Pulse 71   • Temp 97.7 °F (36.5 °C) (Temporal Artery )   • Resp 22   • Ht 64.5\" (163.8 cm) "   • Wt 203 lb 4 oz (92.2 kg)   • SpO2 98%   • BMI 34.35 kg/m2     Physical Exam   HENT:   Head: Normocephalic and atraumatic.   Nose: Nose normal.   Mouth/Throat: Oropharynx is clear and moist.   Eyes: Conjunctivae and EOM are normal.   Neck: Normal range of motion. Neck supple.   Cardiovascular: Normal rate, regular rhythm and normal heart sounds.    Pulmonary/Chest: Effort normal and breath sounds normal.   Abdominal: Soft. Bowel sounds are normal.   Musculoskeletal: She exhibits no edema or tenderness.   Neurological: She is alert. No cranial nerve deficit.   Skin: Skin is warm and dry.   Psychiatric: She has a normal mood and affect.   Vitals reviewed.      Discharge Disposition:  Home or Self Care    Discharge Medications:   Terra Garcia   Home Medication Instructions JOVANNI:[id number]    Printed on:01/20/17 2975   Medication Information                      aspirin 81 MG EC tablet  Take 81 mg by mouth Daily. Delayed Release (DR/EC)              atorvastatin (LIPITOR) 20 MG tablet  Take 20 mg by mouth Every Night.             estradiol (VAGIFEM) 10 MCG tablet vaginal tablet  Insert 1 tablet into the vagina 2 (Two) Times a Week. Mondays and Fridays             fluticasone (FLONASE) 50 MCG/ACT nasal spray  1 spray into each nostril 2 (Two) Times a Day.             magnesium oxide (MAGOX) 400 (241.3 MG) MG tablet tablet  Take 400 mg by mouth 3 (Three) Times a Day.             metoprolol tartrate (LOPRESSOR) 25 MG tablet  Take 0.5 tablets by mouth Every 12 (Twelve) Hours.             Multiple Vitamins-Minerals (CENTRUM SILVER ULTRA WOMENS) tablet  Take 1 tablet by mouth Daily.             olopatadine (PATANASE) 0.6 % solution nasal solution  2 sprays by Each Nare route 2 (two) times a day.             Omega-3 Fatty Acids (FISH OIL PO)  Take 1,200 mg by mouth 3 (Three) Times a Day.             omeprazole (PriLOSEC) 20 MG capsule  Take 20 mg by mouth 2 (Two) Times a Day. Delayed Release (DR/EC)  Take 1 capsule  by oral route 2 times a day              rivaroxaban (XARELTO) 20 MG tablet  Take 1 tablet by mouth Daily With Dinner.                 Discharge Diet:   Diet Instructions     Diet: Cardiac; Thin Liquids, No Restrictions       Discharge Diet:  Cardiac   Fluid Consistency:  Thin Liquids, No Restrictions                 Discharge Care Plan / Instructions:  Keep BP and HR diary to assist with OP management  Watch for any S/SX of bleeding, call MD is this occurs.     Activity at Discharge:   Activity Instructions     Activity as Tolerated                     Follow-up Appointments:   PCP 3-4 days  Cardiology 2 weeks or as directed.     Test Results Pending at Discharge:   OP CBC and BMP 4-5 days      Salma Green DO  01/20/17  4:57 PM

## 2017-01-20 NOTE — PLAN OF CARE
Problem: Patient Care Overview (Adult)  Goal: Plan of Care Review  Outcome: Ongoing (interventions implemented as appropriate)    01/20/17 6733   Coping/Psychosocial Response Interventions   Plan Of Care Reviewed With patient   Patient Care Overview   Progress improving   Outcome Evaluation   Outcome Summary/Follow up Plan patient denies any chest pain.          Problem: Arrhythmia/Dysrhythmia (Symptomatic) (Adult)  Goal: Signs and Symptoms of Listed Potential Problems Will be Absent or Manageable (Arrhythmia/Dysrhythmia)  Outcome: Ongoing (interventions implemented as appropriate)

## 2017-02-06 ENCOUNTER — OFFICE VISIT (OUTPATIENT)
Dept: CARDIOLOGY | Facility: CLINIC | Age: 67
End: 2017-02-06

## 2017-02-06 VITALS
HEIGHT: 64 IN | DIASTOLIC BLOOD PRESSURE: 86 MMHG | WEIGHT: 207 LBS | SYSTOLIC BLOOD PRESSURE: 122 MMHG | BODY MASS INDEX: 35.34 KG/M2 | OXYGEN SATURATION: 98 % | HEART RATE: 82 BPM

## 2017-02-06 DIAGNOSIS — R53.82 CHRONIC FATIGUE: ICD-10-CM

## 2017-02-06 DIAGNOSIS — I10 ESSENTIAL HYPERTENSION: ICD-10-CM

## 2017-02-06 DIAGNOSIS — I48.0 PAROXYSMAL ATRIAL FIBRILLATION (HCC): Primary | ICD-10-CM

## 2017-02-06 PROCEDURE — 99214 OFFICE O/P EST MOD 30 MIN: CPT | Performed by: INTERNAL MEDICINE

## 2017-02-06 PROCEDURE — 93000 ELECTROCARDIOGRAM COMPLETE: CPT | Performed by: INTERNAL MEDICINE

## 2017-02-06 NOTE — PROGRESS NOTES
Terra Garcia  [id number]  1950  66 y.o.  female    Referring Provider: PORTER Brennan    Reason for Follow-up Visit: PAF      Overall doing well  Denies any chest pain  No excessive shortness of breath  Compliant with medications    History of present illness:  Terra Garcia is a 66 y.o. yo female with history of PAF who presents today for   Chief Complaint   Patient presents with   • Atrial Fibrillation     NEW   .    History  Past Medical History   Diagnosis Date   • Arthritis    • Atrial fibrillation    • Diabetes mellitus      Pre-Diabetic   • Dizziness    • Goiter    • Hyperlipidemia      Mixed   • Hypertension    • Thyroid nodule    • Tremor    ,   Past Surgical History   Procedure Laterality Date   • Tubal abdominal ligation     • Tonsillectomy     ,   Family History   Problem Relation Age of Onset   • Heart disease Mother    • Heart attack Mother    • Atrial fibrillation Father    ,   Social History   Substance Use Topics   • Smoking status: Never Smoker   • Smokeless tobacco: None   • Alcohol use Yes      Comment: OCC   ,     Medications  Current Outpatient Prescriptions   Medication Sig Dispense Refill   • aspirin 81 MG EC tablet Take 81 mg by mouth Daily. Delayed Release (DR/EC)      • atorvastatin (LIPITOR) 20 MG tablet Take 20 mg by mouth Every Night.     • estradiol (VAGIFEM) 10 MCG tablet vaginal tablet Insert 1 tablet into the vagina 2 (Two) Times a Week. Mondays and Fridays     • fluticasone (FLONASE) 50 MCG/ACT nasal spray 1 spray into each nostril 2 (Two) Times a Day.     • magnesium oxide (MAGOX) 400 (241.3 MG) MG tablet tablet Take 400 mg by mouth 3 (Three) Times a Day.     • metoprolol tartrate (LOPRESSOR) 25 MG tablet Take 0.5 tablets by mouth Every 12 (Twelve) Hours. 30 tablet 0   • Multiple Vitamins-Minerals (CENTRUM SILVER ULTRA WOMENS) tablet Take 1 tablet by mouth Daily.     • Omega-3 Fatty Acids (FISH OIL PO) Take 1,200 mg by mouth 3 (Three) Times a Day.     •  "omeprazole (PriLOSEC) 20 MG capsule Take 20 mg by mouth As Needed. Delayed Release (DR/EC)  Take 1 capsule by oral route 2 times a day      • rivaroxaban (XARELTO) 20 MG tablet Take 1 tablet by mouth Daily With Dinner. 30 tablet 0     No current facility-administered medications for this visit.        Allergies:  Terramycin [oxytetracycline]    Review of Systems  Review of Systems   Constitution: Negative.   HENT: Negative.    Eyes: Negative.    Cardiovascular: Negative for chest pain, claudication, cyanosis, dyspnea on exertion, irregular heartbeat, leg swelling, near-syncope, orthopnea, palpitations, paroxysmal nocturnal dyspnea and syncope.   Respiratory: Negative.    Endocrine: Negative.    Hematologic/Lymphatic: Negative.    Skin: Negative.    Gastrointestinal: Negative for anorexia.   Genitourinary: Negative.    Neurological: Negative.    Psychiatric/Behavioral: Negative.        Objective     Physical Exam:  Visit Vitals   • /86   • Pulse 82   • Ht 64\" (162.6 cm)   • Wt 207 lb (93.9 kg)   • SpO2 98%   • BMI 35.53 kg/m2     Physical Exam    Results Review:       ECG 12 Lead  Date/Time: 2/6/2017 10:15 AM  Performed by: JOCY GRADY  Authorized by: JOCY GRADY   Comparison: compared with previous ECG from 1/19/2017  Comparison to previous ECG: Sinus rhythm replaced AF  Rhythm: sinus bradycardia  Rate: bradycardic  Conduction: conduction normal  ST Segments: ST segments normal  ST Elevation: all  QRS axis: normal              Assessment/Plan   Patient Active Problem List   Diagnosis   • Paroxysmal atrial fibrillation   • Hypertension   • Atrial fibrillation   • Chronic fatigue       No palpitations. No significant pedal edema. Compliant with medications and diet. Latest labs and medications reviewed.  Recent echo normal LVEF  Recent stress echo low risk for ischemia    Plan:  Same treatment  OK to decrease or stop beta blockers  She declined EP evaluation for AF ablation   Close follow up with you as " scheduled.  Intensive factor modifications.  See order list.    Counseled regarding disease appropriate diet, fluid, caffeine, stimulants and sodium intake as well as importance of compliance to diet, exercise and regular follow up.  Avoid NSAIDS and COX2 inhibitors. Use Acetaminophen PRN.    Return in about 6 months (around 8/6/2017).

## 2017-08-01 ENCOUNTER — OFFICE VISIT (OUTPATIENT)
Dept: CARDIOLOGY | Facility: CLINIC | Age: 67
End: 2017-08-01

## 2017-08-01 VITALS
DIASTOLIC BLOOD PRESSURE: 80 MMHG | HEIGHT: 64 IN | OXYGEN SATURATION: 99 % | HEART RATE: 79 BPM | BODY MASS INDEX: 36.37 KG/M2 | WEIGHT: 213 LBS | SYSTOLIC BLOOD PRESSURE: 120 MMHG

## 2017-08-01 DIAGNOSIS — I10 ESSENTIAL HYPERTENSION: ICD-10-CM

## 2017-08-01 DIAGNOSIS — R42 DIZZY SPELLS: ICD-10-CM

## 2017-08-01 DIAGNOSIS — I48.0 PAROXYSMAL ATRIAL FIBRILLATION (HCC): Primary | ICD-10-CM

## 2017-08-01 DIAGNOSIS — R53.82 CHRONIC FATIGUE: ICD-10-CM

## 2017-08-01 PROCEDURE — 93000 ELECTROCARDIOGRAM COMPLETE: CPT | Performed by: INTERNAL MEDICINE

## 2017-08-01 PROCEDURE — 99214 OFFICE O/P EST MOD 30 MIN: CPT | Performed by: INTERNAL MEDICINE

## 2017-08-01 RX ORDER — ERGOCALCIFEROL 1.25 MG/1
CAPSULE ORAL
Refills: 0 | COMMUNITY
Start: 2017-07-20 | End: 2017-09-20 | Stop reason: ALTCHOICE

## 2017-08-01 RX ORDER — AMOXICILLIN AND CLAVULANATE POTASSIUM 875; 125 MG/1; MG/1
TABLET, FILM COATED ORAL
COMMUNITY
Start: 2017-07-26 | End: 2017-08-30 | Stop reason: ALTCHOICE

## 2017-08-01 RX ORDER — DILTIAZEM HYDROCHLORIDE 120 MG/1
CAPSULE, EXTENDED RELEASE ORAL
COMMUNITY
Start: 2017-07-26 | End: 2017-08-30 | Stop reason: ALTCHOICE

## 2017-08-01 RX ORDER — METOPROLOL SUCCINATE 25 MG/1
25 TABLET, EXTENDED RELEASE ORAL DAILY
Qty: 90 TABLET | Refills: 3 | Status: SHIPPED | OUTPATIENT
Start: 2017-08-01 | End: 2018-09-25

## 2017-08-01 NOTE — PROGRESS NOTES
Terra Garcia  [id number]  1950  67 y.o.  female    Referring Provider: PORTER Brennan    Reason for Follow-up Visit: Paroxysmal atrial fibrillation   Increased heart rate after Toprol was stopped by hospitalist in [facility]  Overall doing well  Denies any chest pain  No excessive shortness of breath  Compliant with medications    History of present illness:  Terra Garcia is a 67 y.o. yo female with history of PAF who presents today for   Chief Complaint   Patient presents with   • Atrial Fibrillation     6 mo f/u   .    History  Past Medical History:   Diagnosis Date   • Arthritis    • Atrial fibrillation    • Diabetes mellitus     Pre-Diabetic   • Dizziness    • Goiter    • Hyperlipidemia     Mixed   • Hypertension    • Thyroid nodule    • Tremor    ,   Past Surgical History:   Procedure Laterality Date   • TONSILLECTOMY     • TUBAL ABDOMINAL LIGATION     ,   Family History   Problem Relation Age of Onset   • Heart disease Mother    • Heart attack Mother    • Atrial fibrillation Father    ,   Social History   Substance Use Topics   • Smoking status: Never Smoker   • Smokeless tobacco: None   • Alcohol use Yes      Comment: OCC   ,     Medications  Current Outpatient Prescriptions   Medication Sig Dispense Refill   • amoxicillin-clavulanate (AUGMENTIN) 875-125 MG per tablet      • aspirin 81 MG EC tablet Take 81 mg by mouth Daily. Delayed Release (DR/EC)      • atorvastatin (LIPITOR) 20 MG tablet Take 20 mg by mouth Every Night.     • DILT- MG 24 hr capsule      • estradiol (VAGIFEM) 10 MCG tablet vaginal tablet Insert 1 tablet into the vagina 2 (Two) Times a Week. Mondays and Fridays     • fluticasone (FLONASE) 50 MCG/ACT nasal spray 1 spray into each nostril 2 (Two) Times a Day.     • magnesium oxide (MAGOX) 400 (241.3 MG) MG tablet tablet Take 400 mg by mouth 3 (Three) Times a Day.     • Multiple Vitamins-Minerals (CENTRUM SILVER ULTRA WOMENS) tablet Take 1 tablet by mouth Daily.   "   • Omega-3 Fatty Acids (FISH OIL PO) Take 1,200 mg by mouth 3 (Three) Times a Day.     • omeprazole (PriLOSEC) 20 MG capsule Take 20 mg by mouth As Needed. Delayed Release (DR/EC)  Take 1 capsule by oral route 2 times a day      • rivaroxaban (XARELTO) 20 MG tablet Take 1 tablet by mouth Daily With Dinner. 30 tablet 0   • vitamin D (ERGOCALCIFEROL) 80711 UNITS capsule capsule TAKE ONE CAPSULE BY MOUTH WEEKLY  0   • metoprolol succinate XL (TOPROL-XL) 25 MG 24 hr tablet Take 1 tablet by mouth Daily. 90 tablet 3     No current facility-administered medications for this visit.        Allergies:  Terramycin [oxytetracycline]    Review of Systems  Review of Systems   Constitution: Negative.   HENT: Negative.    Eyes: Negative.    Cardiovascular: Negative for chest pain, claudication, cyanosis, dyspnea on exertion, irregular heartbeat, leg swelling, near-syncope, orthopnea, palpitations, paroxysmal nocturnal dyspnea and syncope.   Respiratory: Negative.    Endocrine: Negative.    Hematologic/Lymphatic: Negative.    Skin: Negative.    Gastrointestinal: Negative for anorexia.   Genitourinary: Negative.    Neurological: Positive for light-headedness.   Psychiatric/Behavioral: Negative.        Objective     Physical Exam:  /80  Pulse 79  Ht 64\" (162.6 cm)  Wt 213 lb (96.6 kg)  SpO2 99%  BMI 36.56 kg/m2  Physical Exam   Constitutional: She appears well-developed.   HENT:   Head: Normocephalic.   Neck: Normal carotid pulses and no JVD present. No tracheal tenderness present. Carotid bruit is not present. No tracheal deviation and no edema present.   Cardiovascular: Regular rhythm, normal heart sounds and normal pulses.    Pulmonary/Chest: Effort normal. No stridor.   Abdominal: Soft.   Neurological: She is alert. She has normal strength. No cranial nerve deficit or sensory deficit.   Skin: Skin is warm.   Psychiatric: She has a normal mood and affect. Her speech is normal and behavior is normal.       Results " Review:       ECG 12 Lead  Date/Time: 8/1/2017 9:44 AM  Performed by: JOCY GRADY  Authorized by: JOCY GRADY   Comparison: compared with previous ECG from 2/6/2017  Similar to previous ECG  Comparison to previous ECG: APCs new and frequent after Toprol XL stopped hospitalist in [facility]  Rhythm: sinus rhythm  Ectopy: atrial premature contractions  Rate: normal  Conduction: conduction normal  ST Segments: ST segments normal  T Waves: T waves normal  QRS axis: normal  Clinical impression: abnormal ECG            Assessment/Plan   Patient Active Problem List   Diagnosis   • Paroxysmal atrial fibrillation   • Hypertension   • Atrial fibrillation   • Chronic fatigue   • Dizzy spells       No palpitations. No significant pedal edema. Compliant with medications and diet. Latest labs and medications reviewed.  In past echo normal LVEF and stress echo low risk for ischemia    Plan:  Same treatment  Resume Toprol but change to Toprol XL 12.5 mg daily  No additional cardiac testing required at this point in time.  Treat dizziness does not seem to have high fall risk  Monitor for any signs of bleeding including red or dark stools. Fall precautions.   Patient is asked to monitor BP at home or work, several times per month and return with written values at next office visit.   She declined EP evaluation for AF ablation   Close follow up with you as scheduled.  Intensive factor modifications.  See order list.    Counseled regarding disease appropriate diet, fluid, caffeine, stimulants and sodium intake as well as importance of compliance to diet, exercise and regular follow up.  Avoid NSAIDS and COX2 inhibitors. Use Acetaminophen PRN.    Return in about 6 months (around 2/1/2018).

## 2017-08-30 ENCOUNTER — OFFICE VISIT (OUTPATIENT)
Dept: OTOLARYNGOLOGY | Facility: CLINIC | Age: 67
End: 2017-08-30

## 2017-08-30 ENCOUNTER — PROCEDURE VISIT (OUTPATIENT)
Dept: OTOLARYNGOLOGY | Facility: CLINIC | Age: 67
End: 2017-08-30

## 2017-08-30 VITALS
TEMPERATURE: 97.8 F | HEIGHT: 64 IN | BODY MASS INDEX: 36.92 KG/M2 | WEIGHT: 216.25 LBS | HEART RATE: 90 BPM | DIASTOLIC BLOOD PRESSURE: 87 MMHG | SYSTOLIC BLOOD PRESSURE: 145 MMHG

## 2017-08-30 DIAGNOSIS — R53.83 OTHER FATIGUE: ICD-10-CM

## 2017-08-30 DIAGNOSIS — J32.9 CHRONIC SINUSITIS, UNSPECIFIED LOCATION: ICD-10-CM

## 2017-08-30 DIAGNOSIS — E04.2 MULTIPLE THYROID NODULES: ICD-10-CM

## 2017-08-30 DIAGNOSIS — R42 DIZZY SPELLS: Primary | ICD-10-CM

## 2017-08-30 DIAGNOSIS — R26.89 IMBALANCE: ICD-10-CM

## 2017-08-30 PROCEDURE — 99214 OFFICE O/P EST MOD 30 MIN: CPT | Performed by: NURSE PRACTITIONER

## 2017-08-30 RX ORDER — DOXYCYCLINE 100 MG/1
CAPSULE ORAL
Refills: 0 | COMMUNITY
Start: 2017-08-23 | End: 2017-09-20 | Stop reason: ALTCHOICE

## 2017-08-30 RX ORDER — OMEPRAZOLE/SODIUM BICARBONATE 20MG-1.1G
CAPSULE ORAL
Refills: 3 | COMMUNITY
Start: 2017-08-11 | End: 2018-09-25

## 2017-08-30 NOTE — PROGRESS NOTES
YOB: 1950  Location: [facility]  Location Address: [address] [place] [address], [facility] [address], [address] [place] [address]  Location Phone: [phone]    Chief Complaint   Patient presents with   • Dizziness       History of Present Illness  Terra Garcia is a 67 y.o. female.  Terra Garcia is here for follow up of ENT complaints. The patient has had problems with off balance for the last several months.   The symptoms are not localized to a particular location. The patient has had moderate symptoms. The symptoms have been present for the last several years . The symptoms are aggravated by  no identifiable factors . The symptoms are improved by no identifieable factors.  Also c/o persistent nasal congestion.  She began having problems after a flight overseas.  When she returned about 3 weeks later, she began feeling off balance as though she were on a boat.  She has since been diagnosed with AFib then has converted.  The beta blocker is causing fatigue as well which bothers her more than anything.  Her labwork has all been normal  CT A brain and MRI was WNL            Past Medical History:   Diagnosis Date   • Arthritis    • Atrial fibrillation    • Chronic sinusitis    • Diabetes mellitus     Pre-Diabetic   • Dizziness    • Goiter    • Hyperlipidemia     Mixed   • Hypertension    • Hypertrophy of inferior nasal turbinate    • Thyroid nodule    • Tremor        Past Surgical History:   Procedure Laterality Date   • TONSILLECTOMY     • TUBAL ABDOMINAL LIGATION           Current Outpatient Prescriptions:   •  aspirin 81 MG EC tablet, Take 81 mg by mouth Daily. Delayed Release (DR/EC) , Disp: , Rfl:   •  atorvastatin (LIPITOR) 20 MG tablet, Take 20 mg by mouth Every Night., Disp: , Rfl:   •  doxycycline (MONODOX) 100 MG capsule, TAKE ONE CAPSULE BY MOUTH TWICE DAILY, Disp: , Rfl: 0  •  estradiol (VAGIFEM) 10 MCG tablet vaginal tablet, Insert 1 tablet into the vagina 2 (Two) Times a Week.  and ,  Disp: , Rfl:   •  fluticasone (FLONASE) 50 MCG/ACT nasal spray, 1 spray into each nostril 2 (Two) Times a Day., Disp: , Rfl:   •  magnesium oxide (MAGOX) 400 (241.3 MG) MG tablet tablet, Take 400 mg by mouth 3 (Three) Times a Day., Disp: , Rfl:   •  metoprolol succinate XL (TOPROL-XL) 25 MG 24 hr tablet, Take 1 tablet by mouth Daily., Disp: 90 tablet, Rfl: 3  •  Multiple Vitamins-Minerals (CENTRUM SILVER ULTRA WOMENS) tablet, Take 1 tablet by mouth Daily., Disp: , Rfl:   •  Omega-3 Fatty Acids (FISH OIL PO), Take 1,200 mg by mouth 3 (Three) Times a Day., Disp: , Rfl:   •  Omeprazole-Sodium Bicarbonate  MG capsule, TAKE ONE CAPSULE BY MOUTH TWICE DAILY, Disp: , Rfl: 3  •  rivaroxaban (XARELTO) 20 MG tablet, Take 1 tablet by mouth Daily With Dinner., Disp: 30 tablet, Rfl: 0  •  vitamin D (ERGOCALCIFEROL) 09449 UNITS capsule capsule, TAKE ONE CAPSULE BY MOUTH WEEKLY, Disp: , Rfl: 0    Terramycin [oxytetracycline]    Family History   Problem Relation Age of Onset   • Heart disease Mother    • Heart attack Mother    • Atrial fibrillation Father        Social History     Social History   • Marital status:      Spouse name: N/A   • Number of children: N/A   • Years of education: N/A     Occupational History   • Not on file.     Social History Main Topics   • Smoking status: Never Smoker   • Smokeless tobacco: Not on file   • Alcohol use Yes      Comment: RARELY   • Drug use: Defer   • Sexual activity: Defer     Other Topics Concern   • Not on file     Social History Narrative       Review of Systems   Constitutional: Negative.    HENT:        See HPI   Eyes: Negative.    Respiratory: Negative.    Cardiovascular: Negative.    Gastrointestinal: Negative.    Endocrine: Negative.    Genitourinary: Negative.    Musculoskeletal: Negative.    Skin: Negative.    Allergic/Immunologic: Negative.    Neurological: Negative.    Hematological: Negative.    Psychiatric/Behavioral: Negative.        Vitals:    08/30/17 1429    BP: 145/87   Pulse: 90   Temp: 97.8 °F (36.6 °C)       Objective     Physical Exam    CONSTITUTIONAL: well nourished, alert, oriented, in no acute distress     COMMUNICATION AND VOICE: able to communicate normally, normal voice quality    HEAD: normocephalic, no lesions, atraumatic, no tenderness, no masses     FACE: appearance normal, no lesions, no tenderness, no deformities, facial motion symmetric    SALIVARY GLANDS: parotid glands with no tenderness, no swelling, no masses, submandibular glands with normal size, nontender    EYES: ocular motility normal, eyelids normal, orbits normal, no proptosis, conjunctiva normal , pupils equal, round     EARS:  Hearing: response to conversational voice normal bilaterally   External Ears: auricles without lesions  Otoscopic: tympanic membrane appearance normal, no lesions, no perforation, normal mobility, no fluid    NOSE:  External Nose: structure normal, no tenderness on palpation, no nasal discharge, no lesions, no evidence of trauma, nostrils patent   Intranasal Exam: nasal mucosa normal, vestibule within normal limits, inferior turbinate normal, nasal septum midline     ORAL:  Lips: upper and lower lips without lesion   Teeth: dentition within normal limits for age   Gums: gingivae healthy   Oral Mucosa: oral mucosa normal, no mucosal lesions   Floor of Mouth: Warthin’s duct patent, mucosa normal  Tongue: lingual mucosa normal without lesions, normal tongue mobility   Palate: soft and hard palates with normal mucosa and structure  Oropharynx: oropharyngeal mucosa normal    NECK: neck appearance normal, no mass,  noted without erythema or tenderness    THYROID: no overt thyromegaly, no tenderness, nodules or mass present on palpation, position midline     LYMPH NODES: no lymphadenopathy    CHEST/RESPIRATORY: respiratory effort normal, normal breath sounds     CARDIOVASCULAR: rate and rhythm normal, extremities without cyanosis or edema      NEUROLOGIC/PSYCHIATRIC:  oriented to time, place and person, mood normal, affect appropriate, CN II-XII intact grossly      Assessment/Plan   Terra was seen today for dizziness.    Diagnoses and all orders for this visit:    Dizzy spells    Multiple thyroid nodules    Chronic sinusitis, unspecified location    Imbalance    Other fatigue      * Surgery not found *  No orders of the defined types were placed in this encounter.    No Follow-up on file.       Patient Instructions   Call for problems or worsening symptoms    Keep scheduled followup

## 2017-08-30 NOTE — PROGRESS NOTES
CASE HISTORY DETAILS   Ms. Garcia presented to the clinic this date with complaints of dizziness, sinus pressure and fatigue. She reported onset of symptoms approximately 3 months ago. She had been on a trip to [place] but noted symptoms did not start until several weeks after returning home. She described the dizziness as a rocking motion as if she were on a boat. She reported an increase in symptoms with head movements but denied dizziness when rolling in bed. She denied increase in dizziness with pressure changes. She denied changes in hearing. She has had long standing tinnitus but denied a significant change with onset of current complaints.     SUMMARY   RIGHT  · Otoscopy revealed clear EAC/Unremarkable TM.  · Hearing WNL with a moderate SNHL notch at 3 and 4 kHz.  · Immitance measures are consistent with normal Type A tympanogram.    LEFT  · Otoscopy revealed clear EAC/Unremarkable TM.  · Hearing WNLwith a moderate notch at 3-6 kHz.  · Immitance measures are consistent with normal Type A tympanogram.    A Castalia Hallpike was performed and was negative bilaterally.    RECOMMENDATIONS   Results of today's evaluation were discussed with Ms. Dior and the following recommendations were made:  1. ENT evaluation today as scheduled.    AUDIOGRAM AND IMMITANCE       Anupam Hammer, CCC-A  Audiologist

## 2017-09-20 ENCOUNTER — OFFICE VISIT (OUTPATIENT)
Dept: OTOLARYNGOLOGY | Facility: CLINIC | Age: 67
End: 2017-09-20

## 2017-09-20 VITALS
BODY MASS INDEX: 37.28 KG/M2 | SYSTOLIC BLOOD PRESSURE: 141 MMHG | DIASTOLIC BLOOD PRESSURE: 88 MMHG | HEIGHT: 64 IN | HEART RATE: 88 BPM | TEMPERATURE: 97.8 F | WEIGHT: 218.38 LBS

## 2017-09-20 DIAGNOSIS — E04.1 THYROID NODULE: ICD-10-CM

## 2017-09-20 DIAGNOSIS — J32.9 CHRONIC SINUSITIS, UNSPECIFIED LOCATION: ICD-10-CM

## 2017-09-20 DIAGNOSIS — R42 DIZZY SPELLS: ICD-10-CM

## 2017-09-20 DIAGNOSIS — I48.0 PAROXYSMAL ATRIAL FIBRILLATION (HCC): Primary | ICD-10-CM

## 2017-09-20 DIAGNOSIS — J34.3 HYPERTROPHY OF INFERIOR NASAL TURBINATE: ICD-10-CM

## 2017-09-20 DIAGNOSIS — R53.83 OTHER FATIGUE: ICD-10-CM

## 2017-09-20 PROCEDURE — 99214 OFFICE O/P EST MOD 30 MIN: CPT | Performed by: NURSE PRACTITIONER

## 2017-09-20 NOTE — PATIENT INSTRUCTIONS
The patient has a thyroid nodule, which is relatively small, and studies do not suggest a malignancy. I have recommended observation with follow-up with me for repeat ultrasound. I explained the pathology of thyroid nodules including the risks of cancer. The options of surgery were discussed, but we will take an observational course for now.    Call for problems or worsening symptoms

## 2017-09-20 NOTE — PROGRESS NOTES
YOB: 1950  Location: [facility]  Location Address: [address] [place] [address], [facility] [address], [address] [place] [address]  Location Phone: [phone]    Chief Complaint   Patient presents with   • Thyroid Problem       History of Present Illness  Terra Garcia is a 67 y.o. female.  Terra Garcia is here for follow up of ENT complaints. The patient has had problems with off balance for the last several months.   The symptoms are not localized to a particular location. The patient has had moderate symptoms. The symptoms have been present for the last several years . The symptoms are aggravated by  no identifiable factors . The symptoms are improved by no identifieable factors.  Also c/o persistent nasal congestion.  HISTORY:  She began having problems after a flight overseas. When she returned about 3 weeks later, she began feeling off balance as though she were on a boat.  She has since been diagnosed with AFib then has converted.  The beta blocker is causing fatigue as well which bothers her more than anything.  Her labwork has all been normal  CT A brain and MRI was WNL   Dizziness has essentially resolved    TSH [[id number]] (Order [id number])   Lab   Date: 2017 Department: [address] Released By/Authorizing: AGUS Rush (auto-released)       TSH   Order: [id number]   Status:  Final result   Visible to patient:  No (Not Released)      Ref Range & Units 8mo ago     TSH 0.470 - 4.680 mIU/mL 1.080   Resulting Agency  [facility]      Specimen Collected: 17  2:20 PM Last Resulted: 17  3:49 PM                                   Past Medical History:   Diagnosis Date   • Arthritis    • Atrial fibrillation    • Chronic sinusitis    • Diabetes mellitus     Pre-Diabetic   • Dizziness    • Goiter    • Hyperlipidemia     Mixed   • Hypertension    • Hypertrophy of inferior nasal turbinate    • Imbalance    • Thyroid nodule    • Tremor        Past Surgical History:   Procedure  Laterality Date   • TONSILLECTOMY     • TUBAL ABDOMINAL LIGATION           Current Outpatient Prescriptions:   •  aspirin 81 MG EC tablet, Take 81 mg by mouth Daily. Delayed Release (DR/EC) , Disp: , Rfl:   •  atorvastatin (LIPITOR) 20 MG tablet, Take 20 mg by mouth Every Night., Disp: , Rfl:   •  estradiol (VAGIFEM) 10 MCG tablet vaginal tablet, Insert 1 tablet into the vagina 2 (Two) Times a Week. Mondays and Fridays, Disp: , Rfl:   •  magnesium oxide (MAGOX) 400 (241.3 MG) MG tablet tablet, Take 400 mg by mouth 3 (Three) Times a Day., Disp: , Rfl:   •  metoprolol succinate XL (TOPROL-XL) 25 MG 24 hr tablet, Take 1 tablet by mouth Daily., Disp: 90 tablet, Rfl: 3  •  Multiple Vitamins-Minerals (CENTRUM SILVER ULTRA WOMENS) tablet, Take 1 tablet by mouth Daily., Disp: , Rfl:   •  Omega-3 Fatty Acids (FISH OIL PO), Take 1,200 mg by mouth 3 (Three) Times a Day., Disp: , Rfl:   •  Omeprazole-Sodium Bicarbonate  MG capsule, TAKE ONE CAPSULE BY MOUTH TWICE DAILY, Disp: , Rfl: 3  •  rivaroxaban (XARELTO) 20 MG tablet, Take 1 tablet by mouth Daily With Dinner., Disp: 30 tablet, Rfl: 0    Terramycin [oxytetracycline]    Family History   Problem Relation Age of Onset   • Heart disease Mother    • Heart attack Mother    • Atrial fibrillation Father        Social History     Social History   • Marital status:      Spouse name: N/A   • Number of children: N/A   • Years of education: N/A     Occupational History   • Not on file.     Social History Main Topics   • Smoking status: Never Smoker   • Smokeless tobacco: Never Used   • Alcohol use Yes      Comment: RARELY   • Drug use: Defer   • Sexual activity: Defer     Other Topics Concern   • Not on file     Social History Narrative       Review of Systems   Constitutional: Negative.    HENT:        See HPI   Eyes: Negative.    Respiratory: Negative.    Cardiovascular: Negative.    Gastrointestinal: Negative.    Endocrine: Negative.    Genitourinary: Negative.     Musculoskeletal: Negative.    Skin: Negative.    Allergic/Immunologic: Negative.    Neurological: Negative.    Hematological: Negative.    Psychiatric/Behavioral: Negative.        Vitals:    09/20/17 1508   BP: 141/88   Pulse: 88   Temp: 97.8 °F (36.6 °C)       Objective     Physical Exam    CONSTITUTIONAL: well nourished, alert, oriented, in no acute distress     COMMUNICATION AND VOICE: able to communicate normally, normal voice quality    HEAD: normocephalic, no lesions, atraumatic, no tenderness, no masses     FACE: appearance normal, no lesions, no tenderness, no deformities, facial motion symmetric    SALIVARY GLANDS: parotid glands with no tenderness, no swelling, no masses, submandibular glands with normal size, nontender    EYES: ocular motility normal, eyelids normal, orbits normal, no proptosis, conjunctiva normal , pupils equal, round     EARS:  Hearing: response to conversational voice normal bilaterally   External Ears: auricles without lesions  Otoscopic: tympanic membrane appearance normal, no lesions, no perforation, normal mobility, no fluid    NOSE:  External Nose: structure normal, no tenderness on palpation, no nasal discharge, no lesions, no evidence of trauma, nostrils patent   Intranasal Exam: nasal mucosa normal, vestibule within normal limits, inferior turbinate normal, nasal septum midline     ORAL:  Lips: upper and lower lips without lesion   Teeth: dentition within normal limits for age   Gums: gingivae healthy   Oral Mucosa: oral mucosa normal, no mucosal lesions   Floor of Mouth: Warthin’s duct patent, mucosa normal  Tongue: lingual mucosa normal without lesions, normal tongue mobility   Palate: soft and hard palates with normal mucosa and structure  Oropharynx: oropharyngeal mucosa normal    NECK: neck appearance normal, no mass,  noted without erythema or tenderness    THYROID: no overt thyromegaly, no tenderness, nodules or mass present on palpation, position midline     LYMPH  NODES: no lymphadenopathy    CHEST/RESPIRATORY: respiratory effort normal    CARDIOVASCULAR: extremities without cyanosis or edema      NEUROLOGIC/PSYCHIATRIC: oriented to time, place and person, mood normal, affect appropriate, CN II-XII intact grossly      Assessment/Plan   Terra was seen today for thyroid problem.    Diagnoses and all orders for this visit:    Paroxysmal atrial fibrillation    Thyroid nodule  -     US Thyroid; Future    Dizzy spells    Other fatigue    Chronic sinusitis, unspecified location    Hypertrophy of inferior nasal turbinate      * Surgery not found *  Orders Placed This Encounter   Procedures   • US Thyroid     Standing Status:   Future     Standing Expiration Date:   9/20/2019     Order Specific Question:   Reason for Exam:     Answer:   thyroid nodules     Return in about 1 year (around 9/20/2018).       Patient Instructions   The patient has a thyroid nodule, which is relatively small, and studies do not suggest a malignancy. I have recommended observation with follow-up with me for repeat ultrasound. I explained the pathology of thyroid nodules including the risks of cancer. The options of surgery were discussed, but we will take an observational course for now.    Call for problems or worsening symptoms

## 2018-02-02 ENCOUNTER — OFFICE VISIT (OUTPATIENT)
Dept: CARDIOLOGY | Facility: CLINIC | Age: 68
End: 2018-02-02

## 2018-02-02 VITALS
BODY MASS INDEX: 37.39 KG/M2 | SYSTOLIC BLOOD PRESSURE: 130 MMHG | HEART RATE: 63 BPM | WEIGHT: 219 LBS | OXYGEN SATURATION: 98 % | DIASTOLIC BLOOD PRESSURE: 86 MMHG | HEIGHT: 64 IN

## 2018-02-02 DIAGNOSIS — I10 ESSENTIAL HYPERTENSION: ICD-10-CM

## 2018-02-02 DIAGNOSIS — R42 DIZZY SPELLS: ICD-10-CM

## 2018-02-02 DIAGNOSIS — R53.82 CHRONIC FATIGUE: ICD-10-CM

## 2018-02-02 DIAGNOSIS — I48.0 PAROXYSMAL ATRIAL FIBRILLATION (HCC): Primary | ICD-10-CM

## 2018-02-02 PROCEDURE — 99214 OFFICE O/P EST MOD 30 MIN: CPT | Performed by: INTERNAL MEDICINE

## 2018-02-02 PROCEDURE — 93000 ELECTROCARDIOGRAM COMPLETE: CPT | Performed by: INTERNAL MEDICINE

## 2018-02-02 NOTE — PROGRESS NOTES
Terra Garcia  [id number]  1950  67 y.o.  female    Referring Provider: PORTER Brennan    Reason for Follow-up Visit:  Routine follow up.  Paroxysmal atrial fibrillation     Subjective    Overall feeling well   No chest pain or shortness of breath   No palpitations  No significant pedal edema  Compliant with medications and dietary advice  Good effort tolerance  Has tripped and fallen in pat   No loss of consciousness   No presyncope or syncope  Compliant with medications        History of present illness:  Terra Garcia is a 67 y.o. yo female with history of Paroxysmal atrial fibrillation  who presents today for   Chief Complaint   Patient presents with   • Atrial Fibrillation     6 MON FU    .    History  Past Medical History:   Diagnosis Date   • Arthritis    • Atrial fibrillation    • Chronic sinusitis    • Diabetes mellitus     Pre-Diabetic   • Dizziness    • Goiter    • Hyperlipidemia     Mixed   • Hypertension    • Hypertrophy of inferior nasal turbinate    • Imbalance    • Thyroid nodule    • Tremor    ,   Past Surgical History:   Procedure Laterality Date   • TONSILLECTOMY     • TUBAL ABDOMINAL LIGATION     ,   Family History   Problem Relation Age of Onset   • Heart disease Mother    • Heart attack Mother    • Atrial fibrillation Father    ,   Social History   Substance Use Topics   • Smoking status: Never Smoker   • Smokeless tobacco: Never Used   • Alcohol use Yes      Comment: RARELY   ,     Medications  Current Outpatient Prescriptions   Medication Sig Dispense Refill   • aspirin 81 MG EC tablet Take 81 mg by mouth Daily. Delayed Release (DR/EC)      • atorvastatin (LIPITOR) 20 MG tablet Take 20 mg by mouth Every Night.     • estradiol (VAGIFEM) 10 MCG tablet vaginal tablet Insert 1 tablet into the vagina 2 (Two) Times a Week. Mondays and Fridays     • magnesium oxide (MAGOX) 400 (241.3 MG) MG tablet tablet Take 400 mg by mouth 3 (Three) Times a Day.     • metoprolol succinate  "XL (TOPROL-XL) 25 MG 24 hr tablet Take 1 tablet by mouth Daily. 90 tablet 3   • Multiple Vitamins-Minerals (CENTRUM SILVER ULTRA WOMENS) tablet Take 1 tablet by mouth Daily.     • Omega-3 Fatty Acids (FISH OIL PO) Take 1,200 mg by mouth 3 (Three) Times a Day.     • rivaroxaban (XARELTO) 20 MG tablet Take 1 tablet by mouth Daily With Dinner. 30 tablet 0   • Omeprazole-Sodium Bicarbonate  MG capsule TAKE ONE CAPSULE BY MOUTH TWICE DAILY  3     No current facility-administered medications for this visit.        Allergies:  Terramycin [oxytetracycline]    Review of Systems  Review of Systems   Constitution: Negative.   HENT: Negative.    Eyes: Negative.    Cardiovascular: Negative for chest pain, claudication, cyanosis, dyspnea on exertion, irregular heartbeat, leg swelling, near-syncope, orthopnea, palpitations, paroxysmal nocturnal dyspnea and syncope.   Respiratory: Negative.    Endocrine: Negative.    Hematologic/Lymphatic: Negative.    Skin: Negative.    Musculoskeletal: Positive for arthritis and back pain.   Gastrointestinal: Negative for anorexia.   Genitourinary: Negative.    Neurological: Positive for light-headedness.   Psychiatric/Behavioral: Negative.        Objective     Physical Exam:  /86  Pulse 63  Ht 162.6 cm (64\")  Wt 99.3 kg (219 lb)  SpO2 98%  BMI 37.59 kg/m2  Physical Exam   Constitutional: She appears well-developed.   HENT:   Head: Normocephalic.   Neck: Normal carotid pulses and no JVD present. No tracheal tenderness present. Carotid bruit is not present. No tracheal deviation and no edema present.   Cardiovascular: Regular rhythm, normal heart sounds and normal pulses.    Pulmonary/Chest: Effort normal. No stridor.   Abdominal: Soft.   Neurological: She is alert. She has normal strength. No cranial nerve deficit or sensory deficit.   Skin: Skin is warm.   Psychiatric: She has a normal mood and affect. Her speech is normal and behavior is normal.       Results Review:       ECG " "12 Lead  Date/Time: 2/2/2018 11:49 AM  Performed by: JOCY GRADY  Authorized by: JOCY GRADY   Comparison: compared with previous ECG from 8/1/2017  Rhythm: sinus bradycardia  Rate: bradycardic  Conduction: conduction normal  ST Segments: ST segments normal  T Waves: T waves normal  QRS axis: normal  Clinical impression: abnormal ECG            Assessment/Plan   Patient Active Problem List   Diagnosis   • Paroxysmal atrial fibrillation   • Hypertension   • Chronic fatigue   • Dizzy spells     Results for orders placed during the hospital encounter of 01/19/17   Adult Transthoracic Echo Complete    Narrative · All left ventricular wall segments contract normally.  · Left ventricular wall thickness is consistent with mild concentric   hypertrophy.  · Mild mitral valve regurgitation is present     NORMAL LV AND RV SIZE AND FUNCTION  BORDERLINE PULMONARY HTN  NO SIGNIFICANT VALVULAR DYSFUNCTION  MILD MR NOTED       No palpitations. No significant pedal edema. Compliant with medications and diet. Latest labs and medications reviewed.  In past echo normal LVEF and stress echo low risk for ischemia    Plan:    Electrophysiology consultation for atrial flutter ablation   Low salt/ HTN/ Heart healthy carbohydrate restricted cardiac diet as applicable to this patient's current diagnoses.   This handout has relevant information regarding shopping for food, preparing meals, what to eat at restaurants, tracking of food intake, information regarding sodium intake and salt content, how to read food labels, knowing what to eat, tips reagarding physical activity, calorie count and calorie expenditure. What foods to avoid. Information regarding alcoholic drinks along with \"good\" and \"bad\" fats.  Relevant printed educational materials given pertinent to above diagnoses     Monitor for any signs of bleeding including red or dark stools. Fall precautions.   Patient is asked to monitor BP at home or work, several times per month and " return with written values at next office visit.   Close follow up with you as scheduled.  Intensive factor modifications.  See order list.    Counseled regarding disease appropriate diet, fluid, caffeine, stimulants and sodium intake as well as importance of compliance to diet, exercise and regular follow up.  Avoid NSAIDS and COX2 inhibitors. Use Acetaminophen PRN.  Monitor for any signs of bleeding including red or dark stools. Fall precautions.   Patient is asked to monitor BP at home or work, several times per month and return with written values at next office visit.   Orders Placed This Encounter   Procedures   • Ambulatory Referral to Cardiac Electrophysiology     Referral Priority:   Routine     Referral Type:   Consultation     Referral Reason:   Specialty Services Required     Requested Specialty:   Cardiac Electrophysiology     Number of Visits Requested:   1   • ECG 12 Lead     This order was created via procedure documentation       Return in about 6 months (around 8/2/2018).

## 2018-09-25 ENCOUNTER — OFFICE VISIT (OUTPATIENT)
Dept: OTOLARYNGOLOGY | Facility: CLINIC | Age: 68
End: 2018-09-25

## 2018-09-25 ENCOUNTER — OFFICE VISIT (OUTPATIENT)
Dept: CARDIOLOGY | Facility: CLINIC | Age: 68
End: 2018-09-25

## 2018-09-25 ENCOUNTER — CLINICAL SUPPORT (OUTPATIENT)
Dept: OTOLARYNGOLOGY | Facility: CLINIC | Age: 68
End: 2018-09-25

## 2018-09-25 VITALS
SYSTOLIC BLOOD PRESSURE: 141 MMHG | BODY MASS INDEX: 33.33 KG/M2 | TEMPERATURE: 97.8 F | WEIGHT: 195.25 LBS | HEIGHT: 64 IN | HEART RATE: 81 BPM | DIASTOLIC BLOOD PRESSURE: 86 MMHG

## 2018-09-25 VITALS
DIASTOLIC BLOOD PRESSURE: 90 MMHG | WEIGHT: 194 LBS | HEIGHT: 65 IN | SYSTOLIC BLOOD PRESSURE: 120 MMHG | OXYGEN SATURATION: 99 % | BODY MASS INDEX: 32.32 KG/M2 | HEART RATE: 78 BPM

## 2018-09-25 DIAGNOSIS — R42 DIZZY SPELLS: ICD-10-CM

## 2018-09-25 DIAGNOSIS — J32.9 CHRONIC SINUSITIS, UNSPECIFIED LOCATION: ICD-10-CM

## 2018-09-25 DIAGNOSIS — E04.1 THYROID NODULE: ICD-10-CM

## 2018-09-25 DIAGNOSIS — I51.7 LVH (LEFT VENTRICULAR HYPERTROPHY): ICD-10-CM

## 2018-09-25 DIAGNOSIS — E11.9 CONTROLLED TYPE 2 DIABETES MELLITUS WITHOUT COMPLICATION, WITHOUT LONG-TERM CURRENT USE OF INSULIN (HCC): ICD-10-CM

## 2018-09-25 DIAGNOSIS — E04.1 THYROID NODULE: Primary | ICD-10-CM

## 2018-09-25 DIAGNOSIS — I48.0 PAROXYSMAL ATRIAL FIBRILLATION (HCC): Primary | ICD-10-CM

## 2018-09-25 DIAGNOSIS — R53.82 CHRONIC FATIGUE: ICD-10-CM

## 2018-09-25 DIAGNOSIS — I10 ESSENTIAL HYPERTENSION: ICD-10-CM

## 2018-09-25 DIAGNOSIS — J30.9 ALLERGIC RHINITIS, UNSPECIFIED CHRONICITY, UNSPECIFIED SEASONALITY, UNSPECIFIED TRIGGER: ICD-10-CM

## 2018-09-25 PROCEDURE — 99214 OFFICE O/P EST MOD 30 MIN: CPT | Performed by: INTERNAL MEDICINE

## 2018-09-25 PROCEDURE — 76536 US EXAM OF HEAD AND NECK: CPT | Performed by: NURSE PRACTITIONER

## 2018-09-25 PROCEDURE — 93000 ELECTROCARDIOGRAM COMPLETE: CPT | Performed by: INTERNAL MEDICINE

## 2018-09-25 PROCEDURE — 99214 OFFICE O/P EST MOD 30 MIN: CPT | Performed by: NURSE PRACTITIONER

## 2018-09-25 RX ORDER — LORATADINE 10 MG/1
10 TABLET ORAL DAILY
COMMUNITY

## 2018-09-25 RX ORDER — LANSOPRAZOLE 15 MG/1
15 CAPSULE, DELAYED RELEASE ORAL DAILY
COMMUNITY

## 2018-09-25 NOTE — PROGRESS NOTES
YOB: 1950  Location: [facility]  Location Address: [address] [place] [address], [facility], [address] [place] [address]  Location Phone: [phone]    Chief Complaint   Patient presents with   • Thyroid Problem       History of Present Illness  Terra Garcia is a 68 y.o. female.  Terra Garcia is here for follow up of ENT complaints. The patient has had no problems with sinuses,   Hx of thyroid nodules  The symptoms are not localized to a particular location. The patient has had insignificant symptoms. The symptoms have been present for the last several months The symptoms are aggravated by  no identifiable factors. The symptoms are improved by no identifiable factors.       Past Medical History:   Diagnosis Date   • Arthritis    • Atrial fibrillation (CMS/HCC)    • Chronic sinusitis    • Diabetes mellitus (CMS/HCC)     Pre-Diabetic   • Dizziness    • Goiter    • Hyperlipidemia     Mixed   • Hypertension    • Hypertrophy of inferior nasal turbinate    • Imbalance    • Thyroid nodule    • Tremor        Past Surgical History:   Procedure Laterality Date   • CARDIAC ABLATION     • TONSILLECTOMY     • TUBAL ABDOMINAL LIGATION         Outpatient Prescriptions Marked as Taking for the 18 encounter (Office Visit) with Mercedes Anderson APRN   Medication Sig Dispense Refill   • aspirin 81 MG EC tablet Take 81 mg by mouth Daily. Delayed Release (DR/EC)      • atorvastatin (LIPITOR) 20 MG tablet Take 20 mg by mouth Every Night.     • estradiol (VAGIFEM) 10 MCG tablet vaginal tablet Insert 1 tablet into the vagina 2 (Two) Times a Week.  and      • lansoprazole (PREVACID) 15 MG capsule Take 20 mg by mouth Daily.     • loratadine (CLARITIN) 10 MG tablet Take 10 mg by mouth Daily.     • magnesium oxide (MAGOX) 400 (241.3 MG) MG tablet tablet Take 400 mg by mouth 3 (Three) Times a Day.     • metFORMIN (GLUCOPHAGE) 1000 MG tablet Take 1,000 mg by mouth 2 (Two) Times a Day With Meals.     • Multiple  Vitamins-Minerals (CENTRUM SILVER ULTRA WOMENS) tablet Take 1 tablet by mouth Daily.     • Omega-3 Fatty Acids (FISH OIL PO) Take 1,200 mg by mouth 3 (Three) Times a Day.     • rivaroxaban (XARELTO) 20 MG tablet Take 1 tablet by mouth Daily With Dinner. 30 tablet 0       Terramycin [oxytetracycline]    Family History   Problem Relation Age of Onset   • Heart disease Mother    • Heart attack Mother    • Atrial fibrillation Father        Social History     Social History   • Marital status:      Spouse name: N/A   • Number of children: N/A   • Years of education: N/A     Occupational History   • Not on file.     Social History Main Topics   • Smoking status: Never Smoker   • Smokeless tobacco: Never Used   • Alcohol use Yes      Comment: RARELY   • Drug use: Unknown   • Sexual activity: Defer     Other Topics Concern   • Not on file     Social History Narrative   • No narrative on file       Review of Systems   Constitutional: Negative.    HENT:        SEE HPI   Eyes: Negative.    Respiratory: Negative.    Cardiovascular: Positive for palpitations.   Gastrointestinal: Negative.    Endocrine: Negative.    Genitourinary: Negative.    Musculoskeletal: Negative.    Skin: Negative.    Allergic/Immunologic: Positive for environmental allergies.   Neurological: Negative.    Hematological: Negative.    Psychiatric/Behavioral: Negative.        Vitals:    09/25/18 1437   BP: 141/86   Pulse: 81   Temp: 97.8 °F (36.6 °C)       Body mass index is 33.51 kg/m².    Objective     Physical Exam  CONSTITUTIONAL: well nourished, alert, oriented, in no acute distress     COMMUNICATION AND VOICE: able to communicate normally, normal voice quality    HEAD: normocephalic, no lesions, atraumatic, no tenderness, no masses     FACE: appearance normal, no lesions, no tenderness, no deformities, facial motion symmetric    SALIVARY GLANDS: parotid glands with no tenderness, no swelling, no masses, submandibular glands with normal size,  nontender    EYES: ocular motility normal, eyelids normal, orbits normal, no proptosis, conjunctiva normal , pupils equal, round     EARS:  Hearing: response to conversational voice normal bilaterally   External Ears: auricles without lesions  Otoscopic: tympanic membrane appearance normal, no lesions, no perforation, normal mobility, no fluid    NOSE:  External Nose: structure normal, no tenderness on palpation, no nasal discharge, no lesions, no evidence of trauma, nostrils patent   Intranasal Exam: nasal mucosa normal, vestibule within normal limits, inferior turbinate normal, nasal septum midline     ORAL:  Lips: upper and lower lips without lesion   Teeth: dentition within normal limits for age   Gums: gingivae healthy   Oral Mucosa: oral mucosa normal, no mucosal lesions   Floor of Mouth: Warthin’s duct patent, mucosa normal  Tongue: lingual mucosa normal without lesions, normal tongue mobility   Palate: soft and hard palates with normal mucosa and structure  Oropharynx: oropharyngeal mucosa normal    NECK: neck appearance normal, no mass,  noted without erythema or tenderness    THYROID: nodules not palpable    LYMPH NODES: no lymphadenopathy    CHEST/RESPIRATORY: respiratory effort normal,     CARDIOVASCULAR:  extremities without cyanosis or edema      NEUROLOGIC/PSYCHIATRIC: oriented to time, place and person, mood normal, affect appropriate, CN II-XII intact grossly    Assessment/Plan   Terra was seen today for thyroid problem.    Diagnoses and all orders for this visit:    Thyroid nodule  -     TSH; Future    Allergic rhinitis, unspecified chronicity, unspecified seasonality, unspecified trigger    Chronic sinusitis, unspecified location      * Surgery not found *  Orders Placed This Encounter   Procedures   • TSH     Standing Status:   Future     Standing Expiration Date:   9/25/2019     Return in about 1 year (around 9/25/2019).       Patient Instructions   Call for problems or worsening  symptoms    Avoidance of allergies discussed.  Use masks when performing yardwork or cleaning activities if indicated.  Continue allergy medications as discussed.    Air purifier as needed.  If on nasal sprays, recommend to use daily as indicated.   Medical vs surgical options discussed.      Calorie Counting for Weight Loss  Calories are units of energy. Your body needs a certain amount of calories from food to keep you going throughout the day. When you eat more calories than your body needs, your body stores the extra calories as fat. When you eat fewer calories than your body needs, your body burns fat to get the energy it needs.  Calorie counting means keeping track of how many calories you eat and drink each day. Calorie counting can be helpful if you need to lose weight. If you make sure to eat fewer calories than your body needs, you should lose weight. Ask your health care provider what a healthy weight is for you.  For calorie counting to work, you will need to eat the right number of calories in a day in order to lose a healthy amount of weight per week. A dietitian can help you determine how many calories you need in a day and will give you suggestions on how to reach your calorie goal.  · A healthy amount of weight to lose per week is usually 1-2 lb (0.5-0.9 kg). This usually means that your daily calorie intake should be reduced by 500-750 calories.  · Eating 1,200 - 1,500 calories per day can help most women lose weight.  · Eating 1,500 - 1,800 calories per day can help most men lose weight.    What do I need to know about calorie counting?  In order to meet your daily calorie goal, you will need to:  · Find out how many calories are in each food you would like to eat. Try to do this before you eat.  · Decide how much of the food you plan to eat.  · Write down what you ate and how many calories it had. Doing this is called keeping a food log.    To successfully lose weight, it is important to balance  calorie counting with a healthy lifestyle that includes regular activity. Aim for 150 minutes of moderate exercise (such as walking) or 75 minutes of vigorous exercise (such as running) each week.  Where do I find calorie information?    The number of calories in a food can be found on a Nutrition Facts label. If a food does not have a Nutrition Facts label, try to look up the calories online or ask your dietitian for help.  Remember that calories are listed per serving. If you choose to have more than one serving of a food, you will have to multiply the calories per serving by the amount of servings you plan to eat. For example, the label on a package of bread might say that a serving size is 1 slice and that there are 90 calories in a serving. If you eat 1 slice, you will have eaten 90 calories. If you eat 2 slices, you will have eaten 180 calories.  How do I keep a food log?  Immediately after each meal, record the following information in your food log:  · What you ate. Don't forget to include toppings, sauces, and other extras on the food.  · How much you ate. This can be measured in cups, ounces, or number of items.  · How many calories each food and drink had.  · The total number of calories in the meal.    Keep your food log near you, such as in a small notebook in your pocket, or use a mobile thomas or website. Some programs will calculate calories for you and show you how many calories you have left for the day to meet your goal.  What are some calorie counting tips?  · Use your calories on foods and drinks that will fill you up and not leave you hungry:  ? Some examples of foods that fill you up are nuts and nut butters, vegetables, lean proteins, and high-fiber foods like whole grains. High-fiber foods are foods with more than 5 g fiber per serving.  ? Drinks such as sodas, specialty coffee drinks, alcohol, and juices have a lot of calories, yet do not fill you up.  · Eat nutritious foods and avoid empty  "calories. Empty calories are calories you get from foods or beverages that do not have many vitamins or protein, such as candy, sweets, and soda. It is better to have a nutritious high-calorie food (such as an avocado) than a food with few nutrients (such as a bag of chips).  · Know how many calories are in the foods you eat most often. This will help you calculate calorie counts faster.  · Pay attention to calories in drinks. Low-calorie drinks include water and unsweetened drinks.  · Pay attention to nutrition labels for \"low fat\" or \"fat free\" foods. These foods sometimes have the same amount of calories or more calories than the full fat versions. They also often have added sugar, starch, or salt, to make up for flavor that was removed with the fat.  · Find a way of tracking calories that works for you. Get creative. Try different apps or programs if writing down calories does not work for you.  What are some portion control tips?  · Know how many calories are in a serving. This will help you know how many servings of a certain food you can have.  · Use a measuring cup to measure serving sizes. You could also try weighing out portions on a kitchen scale. With time, you will be able to estimate serving sizes for some foods.  · Take some time to put servings of different foods on your favorite plates, bowls, and cups so you know what a serving looks like.  · Try not to eat straight from a bag or box. Doing this can lead to overeating. Put the amount you would like to eat in a cup or on a plate to make sure you are eating the right portion.  · Use smaller plates, glasses, and bowls to prevent overeating.  · Try not to multitask (for example, watch TV or use your computer) while eating. If it is time to eat, sit down at a table and enjoy your food. This will help you to know when you are full. It will also help you to be aware of what you are eating and how much you are eating.  What are tips for following this " plan?  Reading food labels  · Check the calorie count compared to the serving size. The serving size may be smaller than what you are used to eating.  · Check the source of the calories. Make sure the food you are eating is high in vitamins and protein and low in saturated and trans fats.  Shopping  · Read nutrition labels while you shop. This will help you make healthy decisions before you decide to purchase your food.  · Make a grocery list and stick to it.  Cooking  · Try to cook your favorite foods in a healthier way. For example, try baking instead of frying.  · Use low-fat dairy products.  Meal planning  · Use more fruits and vegetables. Half of your plate should be fruits and vegetables.  · Include lean proteins like poultry and fish.  How do I count calories when eating out?  · Ask for smaller portion sizes.  · Consider sharing an entree and sides instead of getting your own entree.  · If you get your own entree, eat only half. Ask for a box at the beginning of your meal and put the rest of your entree in it so you are not tempted to eat it.  · If calories are listed on the menu, choose the lower calorie options.  · Choose dishes that include vegetables, fruits, whole grains, low-fat dairy products, and lean protein.  · Choose items that are boiled, broiled, grilled, or steamed. Stay away from items that are buttered, battered, fried, or served with cream sauce. Items labeled “crispy” are usually fried, unless stated otherwise.  · Choose water, low-fat milk, unsweetened iced tea, or other drinks without added sugar. If you want an alcoholic beverage, choose a lower calorie option such as a glass of wine or light beer.  · Ask for dressings, sauces, and syrups on the side. These are usually high in calories, so you should limit the amount you eat.  · If you want a salad, choose a garden salad and ask for grilled meats. Avoid extra toppings like gates, cheese, or fried items. Ask for the dressing on the side,  or ask for olive oil and vinegar or lemon to use as dressing.  · Estimate how many servings of a food you are given. For example, a serving of cooked rice is ½ cup or about the size of half a baseball. Knowing serving sizes will help you be aware of how much food you are eating at restaurants. The list below tells you how big or small some common portion sizes are based on everyday objects:  ? 1 oz--4 stacked dice.  ? 3 oz--1 deck of cards.  ? 1 tsp--1 die.  ? 1 Tbsp--½ a ping-pong ball.  ? 2 Tbsp--1 ping-pong ball.  ? ½ cup--½ baseball.  ? 1 cup--1 baseball.  Summary  · Calorie counting means keeping track of how many calories you eat and drink each day. If you eat fewer calories than your body needs, you should lose weight.  · A healthy amount of weight to lose per week is usually 1-2 lb (0.5-0.9 kg). This usually means reducing your daily calorie intake by 500-750 calories.  · The number of calories in a food can be found on a Nutrition Facts label. If a food does not have a Nutrition Facts label, try to look up the calories online or ask your dietitian for help.  · Use your calories on foods and drinks that will fill you up, and not on foods and drinks that will leave you hungry.  · Use smaller plates, glasses, and bowls to prevent overeating.  This information is not intended to replace advice given to you by your health care provider. Make sure you discuss any questions you have with your health care provider.  Document Released: 12/18/2006 Document Revised: 11/17/2017 Document Reviewed: 11/17/2017  [employer] Patient Education © 2018 [employer]

## 2018-09-25 NOTE — PROGRESS NOTES
Terra Garcia  [id number]  1950  68 y.o.  female    Referring Provider: Terra Barrios APRN    Reason for Follow-up Visit:  Routine follow up.  Paroxysmal atrial fibrillation s/p AF ablation    Subjective    Overall feeling well   No chest pain or shortness of breath   No palpitations  No significant pedal edema  Compliant with medications and dietary advice  Good effort tolerance  Has tripped and fallen in pat   No loss of consciousness   No presyncope or syncope  Compliant with medications  Intentionally lost 20 lbs     History of present illness:  Terra Garcia is a 68 y.o. yo female with history of Paroxysmal atrial fibrillation  who presents today for   Chief Complaint   Patient presents with   • Atrial Fibrillation     6 MON FU    .    History  Past Medical History:   Diagnosis Date   • Arthritis    • Atrial fibrillation (CMS/HCC)    • Chronic sinusitis    • Diabetes mellitus (CMS/HCC)     Pre-Diabetic   • Dizziness    • Goiter    • Hyperlipidemia     Mixed   • Hypertension    • Hypertrophy of inferior nasal turbinate    • Imbalance    • Thyroid nodule    • Tremor    ,   Past Surgical History:   Procedure Laterality Date   • TONSILLECTOMY     • TUBAL ABDOMINAL LIGATION     ,   Family History   Problem Relation Age of Onset   • Heart disease Mother    • Heart attack Mother    • Atrial fibrillation Father    ,   Social History   Substance Use Topics   • Smoking status: Never Smoker   • Smokeless tobacco: Never Used   • Alcohol use Yes      Comment: RARELY   ,     Medications  Current Outpatient Prescriptions   Medication Sig Dispense Refill   • aspirin 81 MG EC tablet Take 81 mg by mouth Daily. Delayed Release (DR/EC)      • atorvastatin (LIPITOR) 20 MG tablet Take 20 mg by mouth Every Night.     • estradiol (VAGIFEM) 10 MCG tablet vaginal tablet Insert 1 tablet into the vagina 2 (Two) Times a Week. Mondays and Fridays     • lansoprazole (PREVACID) 15 MG capsule Take 20 mg by mouth Daily.    "  • loratadine (CLARITIN) 10 MG tablet Take 10 mg by mouth Daily.     • magnesium oxide (MAGOX) 400 (241.3 MG) MG tablet tablet Take 400 mg by mouth 3 (Three) Times a Day.     • metFORMIN (GLUCOPHAGE) 1000 MG tablet Take 1,000 mg by mouth 2 (Two) Times a Day With Meals.     • Multiple Vitamins-Minerals (CENTRUM SILVER ULTRA WOMENS) tablet Take 1 tablet by mouth Daily.     • Omega-3 Fatty Acids (FISH OIL PO) Take 1,200 mg by mouth 3 (Three) Times a Day.     • Omeprazole-Sodium Bicarbonate  MG capsule ONCE DAILY  3   • rivaroxaban (XARELTO) 20 MG tablet Take 1 tablet by mouth Daily With Dinner. 30 tablet 0   • metoprolol succinate XL (TOPROL-XL) 25 MG 24 hr tablet Take 1 tablet by mouth Daily. 90 tablet 3     No current facility-administered medications for this visit.        Allergies:  Terramycin [oxytetracycline]    Review of Systems  Review of Systems   Constitution: Positive for weakness and malaise/fatigue.   HENT: Negative.    Eyes: Negative.    Cardiovascular: Positive for palpitations. Negative for chest pain, claudication, cyanosis, dyspnea on exertion, irregular heartbeat, leg swelling, near-syncope, orthopnea, paroxysmal nocturnal dyspnea and syncope.   Respiratory: Negative.    Endocrine: Negative.    Hematologic/Lymphatic: Negative.    Skin: Negative.    Musculoskeletal: Positive for arthritis and back pain.   Gastrointestinal: Negative for anorexia.   Genitourinary: Negative.    Neurological: Positive for light-headedness.   Psychiatric/Behavioral: Negative.        Objective     Physical Exam:  /90   Pulse 78   Ht 165.1 cm (65\")   Wt 88 kg (194 lb)   SpO2 99%   BMI 32.28 kg/m²   Physical Exam   Constitutional: She appears well-developed.   HENT:   Head: Normocephalic.   Neck: Normal carotid pulses and no JVD present. No tracheal tenderness present. Carotid bruit is not present. No tracheal deviation and no edema present.   Cardiovascular: Regular rhythm, normal heart sounds and normal " pulses.    Pulmonary/Chest: Effort normal. No stridor.   Abdominal: Soft.   Neurological: She is alert. She has normal strength. No cranial nerve deficit or sensory deficit.   Skin: Skin is warm.   Psychiatric: She has a normal mood and affect. Her speech is normal and behavior is normal.       Results Review:       ECG 12 Lead  Date/Time: 9/25/2018 12:07 PM  Performed by: JOCY GRADY  Authorized by: JOCY GRADY   Comparison: compared with previous ECG from 2/20/2018  Similar to previous ECG  Rhythm: sinus rhythm  Rate: normal  Conduction: conduction normal  ST Segments: ST segments normal  T Waves: T waves normal  QRS axis: normal  Other: no other findings  Clinical impression: normal ECG            Assessment/Plan   Patient Active Problem List   Diagnosis   • Paroxysmal atrial fibrillation (CMS/HCC)   • Hypertension   • Chronic fatigue   • Dizzy spells   • Controlled type 2 diabetes mellitus without complication, without long-term current use of insulin (CMS/HCC)   • LVH (left ventricular hypertrophy)     Results for orders placed during the hospital encounter of 01/19/17   Adult Transthoracic Echo Complete    Narrative · All left ventricular wall segments contract normally.  · Left ventricular wall thickness is consistent with mild concentric   hypertrophy.  · Mild mitral valve regurgitation is present     NORMAL LV AND RV SIZE AND FUNCTION  BORDERLINE PULMONARY HTN  NO SIGNIFICANT VALVULAR DYSFUNCTION  MILD MR NOTED       No palpitations. No significant pedal edema. Compliant with medications and diet. Latest labs and medications reviewed.  In past echo normal LVEF and stress echo low risk for ischemia    Plan:        Low salt/ HTN/ Heart healthy carbohydrate restricted cardiac diet as applicable to this patient's current diagnoses.   This handout has relevant information regarding shopping for food, preparing meals, what to eat at restaurants, tracking of food intake, information regarding sodium intake and  "salt content, how to read food labels, knowing what to eat, tips reagarding physical activity, calorie count and calorie expenditure. What foods to avoid. Information regarding alcoholic drinks along with \"good\" and \"bad\" fats.  Reiterated prior recommendations     The patient is advised to reduce or avoid caffeine or other cardiac stimulants.     The patient was advised that NSAID-type medications have three  very important potential side effects: cardiovascular complications, gastrointestinal irritation including hemorrhage and renal injuries.  Do not use anti-inflammatories such as Motrin/ibuprofen, Alleve/naprosyn, Mobic and like medications Use Tylenol instead.The patient expresses understanding of these issues and questions were answered.   Monitor for any signs of bleeding including red or dark stools. Fall precautions.       Monitor for any signs of bleeding including red or dark stools. Fall precautions.   Patient is asked to monitor BP at home or work, several times per month and return with written values at next office visit.     Advised staying uptodate with immunizations per established standard guidelines.    Reviewed available prior notes, consults, prior visits, laboratory findings, radiology And cardiology relevant reports. Updated chart as applicable. I have reviewed the patient's medical history in detail and updated the computerized patient record as relevant.      Offered to give patient  a copy of my notes and relevant tests/ prior ECG etc for the patient to review and follow specific advise and relevant findings if any, prognosis, along with my current and future plans.      Questions were encouraged, asked and answered to the patient's  understanding and satisfaction. Questions if any regarding current medications and side effects, need for refills and importance of compliance to medications stressed.    Reviewed available prior notes, consults, prior visits, laboratory findings, radiology " and cardiology relevant reports. Updated chart as applicable. I have reviewed the patient's medical history in detail and updated the computerized patient record as relevant.    Updated patient regarding any new or relevant abnormalities on review of records or any new findings on physical exam. Mentioned to patient about purpose of visit and desirable health short and long term goals and objectives.      Keep f/u Dr Muir    Keep A1c less than 7 Primary to monitor  Keep LDL below 70 mg/dl. Monitor liver and renal functions.   Monitor CBC, CMP, TSH (as indicated) and Lipid Panel by primary      No additional cardiac testing required at this point in time.              Return in about 6 months (around 3/25/2019).

## 2018-09-25 NOTE — PATIENT INSTRUCTIONS
Call for problems or worsening symptoms    Avoidance of allergies discussed.  Use masks when performing yardwork or cleaning activities if indicated.  Continue allergy medications as discussed.    Air purifier as needed.  If on nasal sprays, recommend to use daily as indicated.   Medical vs surgical options discussed.      Calorie Counting for Weight Loss  Calories are units of energy. Your body needs a certain amount of calories from food to keep you going throughout the day. When you eat more calories than your body needs, your body stores the extra calories as fat. When you eat fewer calories than your body needs, your body burns fat to get the energy it needs.  Calorie counting means keeping track of how many calories you eat and drink each day. Calorie counting can be helpful if you need to lose weight. If you make sure to eat fewer calories than your body needs, you should lose weight. Ask your health care provider what a healthy weight is for you.  For calorie counting to work, you will need to eat the right number of calories in a day in order to lose a healthy amount of weight per week. A dietitian can help you determine how many calories you need in a day and will give you suggestions on how to reach your calorie goal.  · A healthy amount of weight to lose per week is usually 1-2 lb (0.5-0.9 kg). This usually means that your daily calorie intake should be reduced by 500-750 calories.  · Eating 1,200 - 1,500 calories per day can help most women lose weight.  · Eating 1,500 - 1,800 calories per day can help most men lose weight.    What do I need to know about calorie counting?  In order to meet your daily calorie goal, you will need to:  · Find out how many calories are in each food you would like to eat. Try to do this before you eat.  · Decide how much of the food you plan to eat.  · Write down what you ate and how many calories it had. Doing this is called keeping a food log.    To successfully lose  weight, it is important to balance calorie counting with a healthy lifestyle that includes regular activity. Aim for 150 minutes of moderate exercise (such as walking) or 75 minutes of vigorous exercise (such as running) each week.  Where do I find calorie information?    The number of calories in a food can be found on a Nutrition Facts label. If a food does not have a Nutrition Facts label, try to look up the calories online or ask your dietitian for help.  Remember that calories are listed per serving. If you choose to have more than one serving of a food, you will have to multiply the calories per serving by the amount of servings you plan to eat. For example, the label on a package of bread might say that a serving size is 1 slice and that there are 90 calories in a serving. If you eat 1 slice, you will have eaten 90 calories. If you eat 2 slices, you will have eaten 180 calories.  How do I keep a food log?  Immediately after each meal, record the following information in your food log:  · What you ate. Don't forget to include toppings, sauces, and other extras on the food.  · How much you ate. This can be measured in cups, ounces, or number of items.  · How many calories each food and drink had.  · The total number of calories in the meal.    Keep your food log near you, such as in a small notebook in your pocket, or use a mobile thomas or website. Some programs will calculate calories for you and show you how many calories you have left for the day to meet your goal.  What are some calorie counting tips?  · Use your calories on foods and drinks that will fill you up and not leave you hungry:  ? Some examples of foods that fill you up are nuts and nut butters, vegetables, lean proteins, and high-fiber foods like whole grains. High-fiber foods are foods with more than 5 g fiber per serving.  ? Drinks such as sodas, specialty coffee drinks, alcohol, and juices have a lot of calories, yet do not fill you up.  · Eat  "nutritious foods and avoid empty calories. Empty calories are calories you get from foods or beverages that do not have many vitamins or protein, such as candy, sweets, and soda. It is better to have a nutritious high-calorie food (such as an avocado) than a food with few nutrients (such as a bag of chips).  · Know how many calories are in the foods you eat most often. This will help you calculate calorie counts faster.  · Pay attention to calories in drinks. Low-calorie drinks include water and unsweetened drinks.  · Pay attention to nutrition labels for \"low fat\" or \"fat free\" foods. These foods sometimes have the same amount of calories or more calories than the full fat versions. They also often have added sugar, starch, or salt, to make up for flavor that was removed with the fat.  · Find a way of tracking calories that works for you. Get creative. Try different apps or programs if writing down calories does not work for you.  What are some portion control tips?  · Know how many calories are in a serving. This will help you know how many servings of a certain food you can have.  · Use a measuring cup to measure serving sizes. You could also try weighing out portions on a kitchen scale. With time, you will be able to estimate serving sizes for some foods.  · Take some time to put servings of different foods on your favorite plates, bowls, and cups so you know what a serving looks like.  · Try not to eat straight from a bag or box. Doing this can lead to overeating. Put the amount you would like to eat in a cup or on a plate to make sure you are eating the right portion.  · Use smaller plates, glasses, and bowls to prevent overeating.  · Try not to multitask (for example, watch TV or use your computer) while eating. If it is time to eat, sit down at a table and enjoy your food. This will help you to know when you are full. It will also help you to be aware of what you are eating and how much you are eating.  What " are tips for following this plan?  Reading food labels  · Check the calorie count compared to the serving size. The serving size may be smaller than what you are used to eating.  · Check the source of the calories. Make sure the food you are eating is high in vitamins and protein and low in saturated and trans fats.  Shopping  · Read nutrition labels while you shop. This will help you make healthy decisions before you decide to purchase your food.  · Make a grocery list and stick to it.  Cooking  · Try to cook your favorite foods in a healthier way. For example, try baking instead of frying.  · Use low-fat dairy products.  Meal planning  · Use more fruits and vegetables. Half of your plate should be fruits and vegetables.  · Include lean proteins like poultry and fish.  How do I count calories when eating out?  · Ask for smaller portion sizes.  · Consider sharing an entree and sides instead of getting your own entree.  · If you get your own entree, eat only half. Ask for a box at the beginning of your meal and put the rest of your entree in it so you are not tempted to eat it.  · If calories are listed on the menu, choose the lower calorie options.  · Choose dishes that include vegetables, fruits, whole grains, low-fat dairy products, and lean protein.  · Choose items that are boiled, broiled, grilled, or steamed. Stay away from items that are buttered, battered, fried, or served with cream sauce. Items labeled “crispy” are usually fried, unless stated otherwise.  · Choose water, low-fat milk, unsweetened iced tea, or other drinks without added sugar. If you want an alcoholic beverage, choose a lower calorie option such as a glass of wine or light beer.  · Ask for dressings, sauces, and syrups on the side. These are usually high in calories, so you should limit the amount you eat.  · If you want a salad, choose a garden salad and ask for grilled meats. Avoid extra toppings like gatse, cheese, or fried items. Ask for  the dressing on the side, or ask for olive oil and vinegar or lemon to use as dressing.  · Estimate how many servings of a food you are given. For example, a serving of cooked rice is ½ cup or about the size of half a baseball. Knowing serving sizes will help you be aware of how much food you are eating at restaurants. The list below tells you how big or small some common portion sizes are based on everyday objects:  ? 1 oz--4 stacked dice.  ? 3 oz--1 deck of cards.  ? 1 tsp--1 die.  ? 1 Tbsp--½ a ping-pong ball.  ? 2 Tbsp--1 ping-pong ball.  ? ½ cup--½ baseball.  ? 1 cup--1 baseball.  Summary  · Calorie counting means keeping track of how many calories you eat and drink each day. If you eat fewer calories than your body needs, you should lose weight.  · A healthy amount of weight to lose per week is usually 1-2 lb (0.5-0.9 kg). This usually means reducing your daily calorie intake by 500-750 calories.  · The number of calories in a food can be found on a Nutrition Facts label. If a food does not have a Nutrition Facts label, try to look up the calories online or ask your dietitian for help.  · Use your calories on foods and drinks that will fill you up, and not on foods and drinks that will leave you hungry.  · Use smaller plates, glasses, and bowls to prevent overeating.  This information is not intended to replace advice given to you by your health care provider. Make sure you discuss any questions you have with your health care provider.  Document Released: 12/18/2006 Document Revised: 11/17/2017 Document Reviewed: 11/17/2017  [employer] Patient Education © 2018 [employer]

## 2019-04-24 ENCOUNTER — OFFICE VISIT (OUTPATIENT)
Dept: CARDIOLOGY | Facility: CLINIC | Age: 69
End: 2019-04-24

## 2019-04-24 VITALS
SYSTOLIC BLOOD PRESSURE: 120 MMHG | HEART RATE: 93 BPM | DIASTOLIC BLOOD PRESSURE: 90 MMHG | HEIGHT: 64 IN | WEIGHT: 176 LBS | BODY MASS INDEX: 30.05 KG/M2 | OXYGEN SATURATION: 98 %

## 2019-04-24 DIAGNOSIS — R00.2 PALPITATIONS: ICD-10-CM

## 2019-04-24 DIAGNOSIS — R42 DIZZY SPELLS: ICD-10-CM

## 2019-04-24 DIAGNOSIS — I51.7 LVH (LEFT VENTRICULAR HYPERTROPHY): ICD-10-CM

## 2019-04-24 DIAGNOSIS — I49.3 PVC (PREMATURE VENTRICULAR CONTRACTION): ICD-10-CM

## 2019-04-24 DIAGNOSIS — R53.82 CHRONIC FATIGUE: ICD-10-CM

## 2019-04-24 DIAGNOSIS — E11.9 CONTROLLED TYPE 2 DIABETES MELLITUS WITHOUT COMPLICATION, WITHOUT LONG-TERM CURRENT USE OF INSULIN (HCC): Primary | ICD-10-CM

## 2019-04-24 DIAGNOSIS — I48.0 PAROXYSMAL ATRIAL FIBRILLATION (HCC): ICD-10-CM

## 2019-04-24 DIAGNOSIS — I10 ESSENTIAL HYPERTENSION: ICD-10-CM

## 2019-04-24 PROCEDURE — 93000 ELECTROCARDIOGRAM COMPLETE: CPT | Performed by: INTERNAL MEDICINE

## 2019-04-24 PROCEDURE — 99214 OFFICE O/P EST MOD 30 MIN: CPT | Performed by: INTERNAL MEDICINE

## 2019-04-24 NOTE — PROGRESS NOTES
Terra Garcia  [id number]  1950  68 y.o.  female    Referring Provider: Terra Barrios APRN    Reason for Follow-up Visit:  Routine follow up.  Paroxysmal atrial fibrillation s/p AF ablation    Subjective    Overall feeling well   No chest pain or shortness of breath     Occasional palpitations, once every several weeks lasting for less than 1 minute  No associated symptoms of dizziness, weakness, chest pain,  shortness of breath      No significant pedal edema    Compliant with medications and dietary advice  Good effort tolerance    No presyncope or syncope  Compliant with medications      History of present illness:  Terra Garcia is a 68 y.o. yo female with history of Paroxysmal atrial fibrillation  who presents today for   Chief Complaint   Patient presents with   • Atrial Fibrillation     7 MON FU    • Palpitations   .    History  Past Medical History:   Diagnosis Date   • Allergic rhinitis    • Arthritis    • Atrial fibrillation (CMS/HCC)    • Chronic sinusitis    • Diabetes mellitus (CMS/HCC)     Pre-Diabetic   • Dizziness    • Goiter    • Hyperlipidemia     Mixed   • Hypertension    • Hypertrophy of inferior nasal turbinate    • Imbalance    • Thyroid nodule    • Tremor    ,   Past Surgical History:   Procedure Laterality Date   • CARDIAC ABLATION     • TONSILLECTOMY     • TUBAL ABDOMINAL LIGATION     ,   Family History   Problem Relation Age of Onset   • Heart disease Mother    • Heart attack Mother    • Atrial fibrillation Father    ,   Social History     Tobacco Use   • Smoking status: Never Smoker   • Smokeless tobacco: Never Used   Substance Use Topics   • Alcohol use: Yes     Comment: RARELY   • Drug use: Defer   ,     Medications  Current Outpatient Medications   Medication Sig Dispense Refill   • aspirin 81 MG EC tablet Take 81 mg by mouth Daily. Delayed Release (DR/EC)      • atorvastatin (LIPITOR) 20 MG tablet Take 20 mg by mouth Every Night.     • estradiol (VAGIFEM) 10 MCG  "tablet vaginal tablet Insert 1 tablet into the vagina 2 (Two) Times a Week. Mondays and Fridays     • lansoprazole (PREVACID) 15 MG capsule Take 20 mg by mouth Daily.     • loratadine (CLARITIN) 10 MG tablet Take 10 mg by mouth Daily.     • magnesium oxide (MAGOX) 400 (241.3 MG) MG tablet tablet Take 400 mg by mouth 3 (Three) Times a Day.     • metFORMIN (GLUCOPHAGE) 1000 MG tablet Take 1,000 mg by mouth 2 (Two) Times a Day With Meals.     • Multiple Vitamins-Minerals (CENTRUM SILVER ULTRA WOMENS) tablet Take 1 tablet by mouth Daily.     • Omega-3 Fatty Acids (FISH OIL PO) Take 1,200 mg by mouth 3 (Three) Times a Day.     • rivaroxaban (XARELTO) 20 MG tablet Take 1 tablet by mouth Daily With Dinner. 30 tablet 0     No current facility-administered medications for this visit.        Allergies:  Terramycin [oxytetracycline]    Review of Systems  Review of Systems   Constitution: Positive for weakness and malaise/fatigue.   HENT: Negative.    Eyes: Negative.    Cardiovascular: Positive for palpitations. Negative for chest pain, claudication, cyanosis, dyspnea on exertion, irregular heartbeat, leg swelling, near-syncope, orthopnea, paroxysmal nocturnal dyspnea and syncope.   Respiratory: Negative.    Endocrine: Negative.    Hematologic/Lymphatic: Negative.    Skin: Negative.    Musculoskeletal: Positive for arthritis and back pain.   Gastrointestinal: Negative for anorexia.   Genitourinary: Negative.    Neurological: Positive for light-headedness.   Psychiatric/Behavioral: Negative.        Objective     Physical Exam:  /90   Pulse 93   Ht 162.6 cm (64.02\")   Wt 79.8 kg (176 lb)   SpO2 98%   BMI 30.20 kg/m²   Physical Exam   Constitutional: She appears well-developed.   HENT:   Head: Normocephalic.   Neck: Normal carotid pulses and no JVD present. No tracheal tenderness present. Carotid bruit is not present. No tracheal deviation and no edema present.   Cardiovascular: Regular rhythm, normal heart sounds and " normal pulses.   Pulmonary/Chest: Effort normal. No stridor.   Abdominal: Soft.   Neurological: She is alert. She has normal strength. No cranial nerve deficit or sensory deficit.   Skin: Skin is warm.   Psychiatric: She has a normal mood and affect. Her speech is normal and behavior is normal.       Results Review:       ECG 12 Lead  Date/Time: 4/24/2019 1:22 PM  Performed by: Dionicio Cassidy MD  Authorized by: Dionicio Cassidy MD   Comparison: compared with previous ECG from 9/25/2018  Comparison to previous ECG: Frequent premature ventricular contractions   Rhythm: sinus rhythm  Ectopy: unifocal PVCs  Rate: normal  Conduction: conduction normal  ST Segments: ST segments normal  Other: no other findings    Clinical impression: abnormal EKG            Assessment/Plan   Patient Active Problem List   Diagnosis   • Paroxysmal atrial fibrillation (CMS/HCC)   • Hypertension   • Chronic fatigue   • Dizzy spells   • Controlled type 2 diabetes mellitus without complication, without long-term current use of insulin (CMS/HCC)   • LVH (left ventricular hypertrophy)   • PVC (premature ventricular contraction)   • Palpitations     Results for orders placed during the hospital encounter of 01/19/17   Adult Transthoracic Echo Complete    Narrative · All left ventricular wall segments contract normally.  · Left ventricular wall thickness is consistent with mild concentric   hypertrophy.  · Mild mitral valve regurgitation is present     NORMAL LV AND RV SIZE AND FUNCTION  BORDERLINE PULMONARY HTN  NO SIGNIFICANT VALVULAR DYSFUNCTION  MILD MR NOTED        Plan    Orders Placed This Encounter   Procedures   • Holter Monitor - 24 Hour     [facility]     Standing Status:   Future     Standing Expiration Date:   4/23/2020     Order Specific Question:   Reason for exam?     Answer:   Palpitations   • ECG 12 Lead     This order was created via procedure documentation   • Adult Transthoracic Echo Complete W/ Cont if Necessary Per Protocol      Standing Status:   Future     Standing Expiration Date:   4/23/2020     Order Specific Question:   Reason for exam?     Answer:   Palpitations          ____________________________________________________________________________________________________________________________________________  Health maintenance and recommendations      Low salt/ HTN/ Heart healthy carbohydrate restricted cardiac diet   The patient is advised to reduce or avoid caffeine or other cardiac stimulants.     The patient was advised to avoid long term NSAIDS , use Tylenol PRN instead  Avoid cardiac stimulants including common drugs like Pseudoephedrine or excessive amounts of caffeine    Monitor for any signs of bleeding including red or dark stools. Fall precautions.        Advised staying uptodate with immunizations per established standard guidelines.      Offered to give patient  a copy      Questions were encouraged, asked and answered to the patient's  understanding and satisfaction. Questions if any regarding current medications and side effects, need for refills and importance of compliance to medications stressed.    Reviewed available prior notes, consults, prior visits, laboratory findings, radiology and cardiology relevant reports. Updated chart as applicable. I have reviewed the patient's medical history in detail and updated the computerized patient record as relevant.      Updated patient regarding any new or relevant abnormalities on review of records or any new findings on physical exam. Mentioned to patient about purpose of visit and desirable health short and long term goals and objectives.    Primary to monitor CBC CMP Lipid panel and TSH as applicable    ___________________________________________________________________________________________________________________________________________          Has seen  Dr Muir who released her    Keep A1c less than 7 Primary to monitor  Keep LDL below 70 mg/dl. Monitor liver  and renal functions.   Monitor CBC, CMP, TSH (as indicated) and Lipid Panel by primary               Return in about 3 months (around 7/24/2019).

## 2019-07-08 DIAGNOSIS — E04.1 THYROID NODULE: Primary | ICD-10-CM

## 2019-07-17 ENCOUNTER — APPOINTMENT (OUTPATIENT)
Dept: GENERAL RADIOLOGY | Facility: HOSPITAL | Age: 69
End: 2019-07-17

## 2019-07-17 ENCOUNTER — HOSPITAL ENCOUNTER (OUTPATIENT)
Facility: HOSPITAL | Age: 69
Setting detail: OBSERVATION
Discharge: HOME | End: 2019-07-19
Attending: EMERGENCY MEDICINE | Admitting: INTERNAL MEDICINE

## 2019-07-17 DIAGNOSIS — R00.2 PALPITATIONS: Primary | ICD-10-CM

## 2019-07-17 DIAGNOSIS — I49.3 PVC'S (PREMATURE VENTRICULAR CONTRACTIONS): ICD-10-CM

## 2019-07-17 DIAGNOSIS — R07.9 CHEST PAIN, UNSPECIFIED TYPE: ICD-10-CM

## 2019-07-17 PROBLEM — R07.89 OTHER CHEST PAIN: Status: ACTIVE | Noted: 2019-07-17

## 2019-07-17 LAB
ALBUMIN SERPL-MCNC: 4 G/DL (ref 3.5–5)
ALBUMIN/GLOB SERPL: 1.5 G/DL (ref 1.1–2.5)
ALP SERPL-CCNC: 47 U/L (ref 24–120)
ALT SERPL W P-5'-P-CCNC: 27 U/L (ref 0–54)
ANION GAP SERPL CALCULATED.3IONS-SCNC: 8 MMOL/L (ref 4–13)
AST SERPL-CCNC: 25 U/L (ref 7–45)
BASOPHILS # BLD AUTO: 0.03 10*3/MM3 (ref 0–0.2)
BASOPHILS NFR BLD AUTO: 0.3 % (ref 0–2)
BILIRUB SERPL-MCNC: 0.8 MG/DL (ref 0.1–1)
BUN BLD-MCNC: 15 MG/DL (ref 5–21)
BUN/CREAT SERPL: 21.1 (ref 7–25)
CALCIUM SPEC-SCNC: 9.6 MG/DL (ref 8.4–10.4)
CHLORIDE SERPL-SCNC: 100 MMOL/L (ref 98–110)
CO2 SERPL-SCNC: 29 MMOL/L (ref 24–31)
CREAT BLD-MCNC: 0.71 MG/DL (ref 0.5–1.4)
DEPRECATED RDW RBC AUTO: 40.3 FL (ref 40–54)
EOSINOPHIL # BLD AUTO: 0.12 10*3/MM3 (ref 0–0.7)
EOSINOPHIL NFR BLD AUTO: 1.2 % (ref 0–4)
ERYTHROCYTE [DISTWIDTH] IN BLOOD BY AUTOMATED COUNT: 12 % (ref 12–15)
GFR SERPL CREATININE-BSD FRML MDRD: 82 ML/MIN/1.73
GLOBULIN UR ELPH-MCNC: 2.6 GM/DL
GLUCOSE BLD-MCNC: 113 MG/DL (ref 70–100)
GLUCOSE BLDC GLUCOMTR-MCNC: 122 MG/DL (ref 70–130)
HCT VFR BLD AUTO: 42.2 % (ref 37–47)
HGB BLD-MCNC: 14.3 G/DL (ref 12–16)
HOLD SPECIMEN: NORMAL
HOLD SPECIMEN: NORMAL
IMM GRANULOCYTES # BLD AUTO: 0.03 10*3/MM3 (ref 0–0.05)
IMM GRANULOCYTES NFR BLD AUTO: 0.3 % (ref 0–5)
LYMPHOCYTES # BLD AUTO: 2.24 10*3/MM3 (ref 0.72–4.86)
LYMPHOCYTES NFR BLD AUTO: 22.6 % (ref 15–45)
MCH RBC QN AUTO: 31 PG (ref 28–32)
MCHC RBC AUTO-ENTMCNC: 33.9 G/DL (ref 33–36)
MCV RBC AUTO: 91.5 FL (ref 82–98)
MONOCYTES # BLD AUTO: 0.43 10*3/MM3 (ref 0.19–1.3)
MONOCYTES NFR BLD AUTO: 4.3 % (ref 4–12)
NEUTROPHILS # BLD AUTO: 7.06 10*3/MM3 (ref 1.87–8.4)
NEUTROPHILS NFR BLD AUTO: 71.3 % (ref 39–78)
NRBC BLD AUTO-RTO: 0 /100 WBC (ref 0–0.2)
PLATELET # BLD AUTO: 265 10*3/MM3 (ref 130–400)
PMV BLD AUTO: 9.9 FL (ref 6–12)
POTASSIUM BLD-SCNC: 3.9 MMOL/L (ref 3.5–5.3)
PROT SERPL-MCNC: 6.6 G/DL (ref 6.3–8.7)
RBC # BLD AUTO: 4.61 10*6/MM3 (ref 4.2–5.4)
SODIUM BLD-SCNC: 137 MMOL/L (ref 135–145)
TROPONIN I SERPL-MCNC: <0.012 NG/ML (ref 0–0.03)
TROPONIN I SERPL-MCNC: <0.012 NG/ML (ref 0–0.03)
WBC NRBC COR # BLD: 9.91 10*3/MM3 (ref 4.8–10.8)
WHOLE BLOOD HOLD SPECIMEN: NORMAL
WHOLE BLOOD HOLD SPECIMEN: NORMAL

## 2019-07-17 PROCEDURE — 71045 X-RAY EXAM CHEST 1 VIEW: CPT

## 2019-07-17 PROCEDURE — 93005 ELECTROCARDIOGRAM TRACING: CPT | Performed by: NURSE PRACTITIONER

## 2019-07-17 PROCEDURE — 93005 ELECTROCARDIOGRAM TRACING: CPT

## 2019-07-17 PROCEDURE — 99285 EMERGENCY DEPT VISIT HI MDM: CPT

## 2019-07-17 PROCEDURE — 96374 THER/PROPH/DIAG INJ IV PUSH: CPT

## 2019-07-17 PROCEDURE — 93005 ELECTROCARDIOGRAM TRACING: CPT | Performed by: EMERGENCY MEDICINE

## 2019-07-17 PROCEDURE — G0378 HOSPITAL OBSERVATION PER HR: HCPCS

## 2019-07-17 PROCEDURE — 85025 COMPLETE CBC W/AUTO DIFF WBC: CPT | Performed by: EMERGENCY MEDICINE

## 2019-07-17 PROCEDURE — 82962 GLUCOSE BLOOD TEST: CPT

## 2019-07-17 PROCEDURE — 84484 ASSAY OF TROPONIN QUANT: CPT | Performed by: EMERGENCY MEDICINE

## 2019-07-17 PROCEDURE — 93010 ELECTROCARDIOGRAM REPORT: CPT | Performed by: INTERNAL MEDICINE

## 2019-07-17 PROCEDURE — 80053 COMPREHEN METABOLIC PANEL: CPT | Performed by: EMERGENCY MEDICINE

## 2019-07-17 RX ORDER — ACETAMINOPHEN 325 MG/1
650 TABLET ORAL EVERY 4 HOURS PRN
Status: DISCONTINUED | OUTPATIENT
Start: 2019-07-17 | End: 2019-07-19 | Stop reason: HOSPADM

## 2019-07-17 RX ORDER — SODIUM CHLORIDE 0.9 % (FLUSH) 0.9 %
10 SYRINGE (ML) INJECTION AS NEEDED
Status: DISCONTINUED | OUTPATIENT
Start: 2019-07-17 | End: 2019-07-19 | Stop reason: HOSPADM

## 2019-07-17 RX ORDER — ATORVASTATIN CALCIUM 10 MG/1
20 TABLET, FILM COATED ORAL 2 TIMES DAILY
Status: DISCONTINUED | OUTPATIENT
Start: 2019-07-17 | End: 2019-07-19 | Stop reason: HOSPADM

## 2019-07-17 RX ORDER — ASPIRIN 81 MG/1
324 TABLET, CHEWABLE ORAL ONCE
Status: COMPLETED | OUTPATIENT
Start: 2019-07-17 | End: 2019-07-17

## 2019-07-17 RX ORDER — PANTOPRAZOLE SODIUM 40 MG/1
40 TABLET, DELAYED RELEASE ORAL DAILY
Status: DISCONTINUED | OUTPATIENT
Start: 2019-07-18 | End: 2019-07-19 | Stop reason: HOSPADM

## 2019-07-17 RX ORDER — CETIRIZINE HYDROCHLORIDE 10 MG/1
10 TABLET ORAL DAILY
Status: DISCONTINUED | OUTPATIENT
Start: 2019-07-18 | End: 2019-07-19 | Stop reason: HOSPADM

## 2019-07-17 RX ORDER — ASPIRIN 81 MG/1
81 TABLET ORAL DAILY
Status: DISCONTINUED | OUTPATIENT
Start: 2019-07-18 | End: 2019-07-18

## 2019-07-17 RX ORDER — DEXTROSE MONOHYDRATE 25 G/50ML
25 INJECTION, SOLUTION INTRAVENOUS
Status: DISCONTINUED | OUTPATIENT
Start: 2019-07-17 | End: 2019-07-19 | Stop reason: HOSPADM

## 2019-07-17 RX ORDER — NITROGLYCERIN 0.4 MG/1
0.4 TABLET SUBLINGUAL ONCE
Status: COMPLETED | OUTPATIENT
Start: 2019-07-17 | End: 2019-07-17

## 2019-07-17 RX ORDER — NITROGLYCERIN 0.4 MG/1
0.4 TABLET SUBLINGUAL
Status: DISCONTINUED | OUTPATIENT
Start: 2019-07-17 | End: 2019-07-19 | Stop reason: HOSPADM

## 2019-07-17 RX ORDER — ONDANSETRON 2 MG/ML
4 INJECTION INTRAMUSCULAR; INTRAVENOUS EVERY 6 HOURS PRN
Status: DISCONTINUED | OUTPATIENT
Start: 2019-07-17 | End: 2019-07-19 | Stop reason: HOSPADM

## 2019-07-17 RX ORDER — SODIUM CHLORIDE 0.9 % (FLUSH) 0.9 %
3 SYRINGE (ML) INJECTION EVERY 12 HOURS SCHEDULED
Status: DISCONTINUED | OUTPATIENT
Start: 2019-07-17 | End: 2019-07-19 | Stop reason: HOSPADM

## 2019-07-17 RX ORDER — ONDANSETRON 4 MG/1
4 TABLET, FILM COATED ORAL EVERY 6 HOURS PRN
Status: DISCONTINUED | OUTPATIENT
Start: 2019-07-17 | End: 2019-07-19 | Stop reason: HOSPADM

## 2019-07-17 RX ORDER — LABETALOL HYDROCHLORIDE 5 MG/ML
10 INJECTION, SOLUTION INTRAVENOUS ONCE
Status: COMPLETED | OUTPATIENT
Start: 2019-07-17 | End: 2019-07-17

## 2019-07-17 RX ORDER — SODIUM CHLORIDE 0.9 % (FLUSH) 0.9 %
3-10 SYRINGE (ML) INJECTION AS NEEDED
Status: DISCONTINUED | OUTPATIENT
Start: 2019-07-17 | End: 2019-07-19 | Stop reason: HOSPADM

## 2019-07-17 RX ORDER — NICOTINE POLACRILEX 4 MG
15 LOZENGE BUCCAL
Status: DISCONTINUED | OUTPATIENT
Start: 2019-07-17 | End: 2019-07-19 | Stop reason: HOSPADM

## 2019-07-17 RX ADMIN — NITROGLYCERIN 0.4 MG: 0.4 TABLET SUBLINGUAL at 21:30

## 2019-07-17 RX ADMIN — METFORMIN HYDROCHLORIDE 1000 MG: 500 TABLET ORAL at 23:05

## 2019-07-17 RX ADMIN — MAGNESIUM GLUCONATE 500 MG ORAL TABLET 400 MG: 500 TABLET ORAL at 23:05

## 2019-07-17 RX ADMIN — SODIUM CHLORIDE, PRESERVATIVE FREE 3 ML: 5 INJECTION INTRAVENOUS at 23:05

## 2019-07-17 RX ADMIN — RIVAROXABAN 20 MG: 20 TABLET, FILM COATED ORAL at 23:05

## 2019-07-17 RX ADMIN — ASPIRIN 81 MG 324 MG: 81 TABLET ORAL at 18:01

## 2019-07-17 RX ADMIN — LABETALOL HYDROCHLORIDE 10 MG: 5 INJECTION INTRAVENOUS at 18:22

## 2019-07-17 RX ADMIN — ATORVASTATIN CALCIUM 20 MG: 10 TABLET, FILM COATED ORAL at 23:05

## 2019-07-17 NOTE — ED PROVIDER NOTES
Subjective   Patient with a history of atrial fibrillation she was ablated for this now she is complaining of PVCs        Palpitations   Palpitations quality:  Regular  Onset quality:  Sudden  Timing:  Intermittent  Progression:  Unchanged  Chronicity:  New  Context: not anxiety, not appetite suppressants, not blood loss, not dehydration, not exercise, not nicotine and not stimulant use    Relieved by:  Nothing  Worsened by:  Nothing  Ineffective treatments:  None tried  Associated symptoms: no back pain, no chest pain, no cough, no diaphoresis, no dizziness, no lower extremity edema, no malaise/fatigue, no nausea, no near-syncope, no numbness, no PND, no shortness of breath, no syncope, no vomiting and no weakness    Risk factors: no diabetes mellitus, no heart disease, no hx of atrial fibrillation, no hx of thyroid disease, no hypercoagulable state, no OTC sinus medications and no stress        Review of Systems   Constitutional: Negative.  Negative for diaphoresis and malaise/fatigue.   HENT: Negative.    Eyes: Negative.    Respiratory: Negative.  Negative for cough and shortness of breath.    Cardiovascular: Positive for palpitations. Negative for chest pain, syncope, PND and near-syncope.   Gastrointestinal: Negative.  Negative for nausea and vomiting.   Musculoskeletal: Negative.  Negative for back pain and neck pain.   Skin: Negative.    Neurological: Negative.  Negative for dizziness, weakness and numbness.   All other systems reviewed and are negative.      Past Medical History:   Diagnosis Date   • Allergic rhinitis    • Arthritis    • Atrial fibrillation (CMS/HCC)    • Chronic sinusitis    • Diabetes mellitus (CMS/HCC)     Pre-Diabetic   • Dizziness    • Goiter    • Hyperlipidemia     Mixed   • Hypertension    • Hypertrophy of inferior nasal turbinate    • Imbalance    • Thyroid nodule    • Tremor        Allergies   Allergen Reactions   • Terramycin [Oxytetracycline]        Past Surgical History:    Procedure Laterality Date   • CARDIAC ABLATION     • TONSILLECTOMY     • TUBAL ABDOMINAL LIGATION         Family History   Problem Relation Age of Onset   • Heart disease Mother    • Heart attack Mother    • Atrial fibrillation Father        Social History     Socioeconomic History   • Marital status:      Spouse name: Not on file   • Number of children: Not on file   • Years of education: Not on file   • Highest education level: Not on file   Tobacco Use   • Smoking status: Never Smoker   • Smokeless tobacco: Never Used   Substance and Sexual Activity   • Alcohol use: Yes     Comment: RARELY   • Drug use: Defer   • Sexual activity: Defer           Objective   Physical Exam   Constitutional: She is oriented to person, place, and time. She appears well-developed and well-nourished.   HENT:   Head: Normocephalic.   Right Ear: External ear normal.   Eyes: Conjunctivae are normal. Pupils are equal, round, and reactive to light.   Neck: Normal range of motion. Neck supple.   Cardiovascular: Normal rate, regular rhythm, normal heart sounds and intact distal pulses. PMI is not displaced. Exam reveals no decreased pulses.   No murmur heard.  Pulmonary/Chest: Effort normal and breath sounds normal. No accessory muscle usage. No tachypnea. No respiratory distress. She has no decreased breath sounds. She has no wheezes. She has no rales. She exhibits no tenderness.   Abdominal: Soft. Bowel sounds are normal. There is no tenderness.   Musculoskeletal: Normal range of motion. She exhibits no edema or tenderness.   Lower extremity exam bilaterally is unremarkable.  There is no right or left calf tenderness .  There is no palpable venous cord.  No obvious difference in the size of the legs.  No pitting edema.  The dorsalis pedis and posterior tibial femoral and popliteal pulses are palpable and +2 bilaterally.  Homans sign is negative   Neurological: She is alert and oriented to person, place, and time. She has normal  reflexes. No cranial nerve deficit. Coordination normal.   Skin: Skin is warm. No rash noted. No erythema.   Nursing note and vitals reviewed.      Procedures           ED Course  ED Course as of Jul 17 2114 Wed Jul 17, 2019 2054 Looking at the patient's vitals have been saturations marked at 79% 85% the patient has no shortness of breath these are not true saturation monitors her heart rate is normal her blood pressure is normal these note not to saturations have the nurses take a better set of vitals on her  [TS]   2112 Patient is still having some chest discomfort now and palpitations and she is concerned about these and will go ahead and put her in the hospital under care of Dr. Edge  [TS]   2112 The patient not telling me that she is having some chest pressure associated with this episodically  [TS]      ED Course User Index  [TS] Ulises Lopez MD                  [facility]      Final diagnoses:   Palpitations   PVC's (premature ventricular contractions)   Chest pain, unspecified type            Ulises Lopez MD  07/17/19 2114

## 2019-07-18 ENCOUNTER — APPOINTMENT (OUTPATIENT)
Dept: CARDIOLOGY | Facility: HOSPITAL | Age: 69
End: 2019-07-18

## 2019-07-18 PROBLEM — R53.83 FATIGUE: Status: ACTIVE | Noted: 2019-07-18

## 2019-07-18 PROBLEM — I49.3 PVC (PREMATURE VENTRICULAR CONTRACTION): Status: ACTIVE | Noted: 2019-07-18

## 2019-07-18 PROBLEM — H53.9 VISUAL DISTURBANCE: Status: ACTIVE | Noted: 2019-07-18

## 2019-07-18 LAB
ANION GAP SERPL CALCULATED.3IONS-SCNC: 6 MMOL/L (ref 4–13)
ARTICHOKE IGE QN: 81 MG/DL (ref 0–99)
BH CV ECHO MEAS - AO MAX PG (FULL): 4.2 MMHG
BH CV ECHO MEAS - AO MAX PG: 7.4 MMHG
BH CV ECHO MEAS - AO MEAN PG (FULL): 2 MMHG
BH CV ECHO MEAS - AO MEAN PG: 4 MMHG
BH CV ECHO MEAS - AO ROOT AREA (BSA CORRECTED): 1.5
BH CV ECHO MEAS - AO ROOT AREA: 5.7 CM^2
BH CV ECHO MEAS - AO ROOT DIAM: 2.7 CM
BH CV ECHO MEAS - AO V2 MAX: 136 CM/SEC
BH CV ECHO MEAS - AO V2 MEAN: 99 CM/SEC
BH CV ECHO MEAS - AO V2 VTI: 28.8 CM
BH CV ECHO MEAS - AVA(I,A): 2.5 CM^2
BH CV ECHO MEAS - AVA(I,D): 2.5 CM^2
BH CV ECHO MEAS - AVA(V,A): 2.3 CM^2
BH CV ECHO MEAS - AVA(V,D): 2.3 CM^2
BH CV ECHO MEAS - BSA(HAYCOCK): 1.9 M^2
BH CV ECHO MEAS - BSA: 1.8 M^2
BH CV ECHO MEAS - BZI_BMI: 29.2 KILOGRAMS/M^2
BH CV ECHO MEAS - BZI_METRIC_HEIGHT: 162.6 CM
BH CV ECHO MEAS - BZI_METRIC_WEIGHT: 77.1 KG
BH CV ECHO MEAS - EDV(CUBED): 60.2 ML
BH CV ECHO MEAS - EDV(MOD-SP4): 84.8 ML
BH CV ECHO MEAS - EDV(TEICH): 66.7 ML
BH CV ECHO MEAS - EF(CUBED): 69.1 %
BH CV ECHO MEAS - EF(MOD-SP4): 56.6 %
BH CV ECHO MEAS - EF(TEICH): 61.3 %
BH CV ECHO MEAS - ESV(CUBED): 18.6 ML
BH CV ECHO MEAS - ESV(MOD-SP4): 36.8 ML
BH CV ECHO MEAS - ESV(TEICH): 25.8 ML
BH CV ECHO MEAS - FS: 32.4 %
BH CV ECHO MEAS - IVS/LVPW: 1.6
BH CV ECHO MEAS - IVSD: 1.3 CM
BH CV ECHO MEAS - LA DIMENSION: 3.2 CM
BH CV ECHO MEAS - LA/AO: 1.2
BH CV ECHO MEAS - LAT PEAK E' VEL: 8.7 CM/SEC
BH CV ECHO MEAS - LV DIASTOLIC VOL/BSA (35-75): 46.5 ML/M^2
BH CV ECHO MEAS - LV MASS(C)D: 187.5 GRAMS
BH CV ECHO MEAS - LV MASS(C)DI: 102.7 GRAMS/M^2
BH CV ECHO MEAS - LV MAX PG: 3.2 MMHG
BH CV ECHO MEAS - LV MEAN PG: 2 MMHG
BH CV ECHO MEAS - LV SYSTOLIC VOL/BSA (12-30): 20.2 ML/M^2
BH CV ECHO MEAS - LV V1 MAX: 89.2 CM/SEC
BH CV ECHO MEAS - LV V1 MEAN: 61.8 CM/SEC
BH CV ECHO MEAS - LV V1 VTI: 20.6 CM
BH CV ECHO MEAS - LVIDD: 3.9 CM
BH CV ECHO MEAS - LVIDS: 2.7 CM
BH CV ECHO MEAS - LVLD AP4: 7 CM
BH CV ECHO MEAS - LVLS AP4: 5.6 CM
BH CV ECHO MEAS - LVOT AREA (M): 3.5 CM^2
BH CV ECHO MEAS - LVOT AREA: 3.5 CM^2
BH CV ECHO MEAS - LVOT DIAM: 2.1 CM
BH CV ECHO MEAS - LVPWD: 1 CM
BH CV ECHO MEAS - MED PEAK E' VEL: 4.68 CM/SEC
BH CV ECHO MEAS - MR MAX PG: 105.7 MMHG
BH CV ECHO MEAS - MR MAX VEL: 514 CM/SEC
BH CV ECHO MEAS - MR MEAN PG: 77 MMHG
BH CV ECHO MEAS - MR MEAN VEL: 421 CM/SEC
BH CV ECHO MEAS - MR VTI: 181 CM
BH CV ECHO MEAS - MV A MAX VEL: 58.6 CM/SEC
BH CV ECHO MEAS - MV DEC SLOPE: 173 CM/SEC^2
BH CV ECHO MEAS - MV DEC TIME: 0.3 SEC
BH CV ECHO MEAS - MV E MAX VEL: 52.4 CM/SEC
BH CV ECHO MEAS - MV E/A: 0.89
BH CV ECHO MEAS - RAP SYSTOLE: 5 MMHG
BH CV ECHO MEAS - RVSP: 36.8 MMHG
BH CV ECHO MEAS - SI(AO): 90.3 ML/M^2
BH CV ECHO MEAS - SI(CUBED): 22.8 ML/M^2
BH CV ECHO MEAS - SI(LVOT): 39.1 ML/M^2
BH CV ECHO MEAS - SI(MOD-SP4): 26.3 ML/M^2
BH CV ECHO MEAS - SI(TEICH): 22.4 ML/M^2
BH CV ECHO MEAS - SV(AO): 164.9 ML
BH CV ECHO MEAS - SV(CUBED): 41.6 ML
BH CV ECHO MEAS - SV(LVOT): 71.4 ML
BH CV ECHO MEAS - SV(MOD-SP4): 48 ML
BH CV ECHO MEAS - SV(TEICH): 40.9 ML
BH CV ECHO MEAS - TR MAX VEL: 282 CM/SEC
BH CV ECHO MEASUREMENTS AVERAGE E/E' RATIO: 7.83
BUN BLD-MCNC: 13 MG/DL (ref 5–21)
BUN/CREAT SERPL: 18.1 (ref 7–25)
CALCIUM SPEC-SCNC: 9.8 MG/DL (ref 8.4–10.4)
CHLORIDE SERPL-SCNC: 104 MMOL/L (ref 98–110)
CHOLEST SERPL-MCNC: 115 MG/DL (ref 130–200)
CO2 SERPL-SCNC: 30 MMOL/L (ref 24–31)
CREAT BLD-MCNC: 0.72 MG/DL (ref 0.5–1.4)
GFR SERPL CREATININE-BSD FRML MDRD: 81 ML/MIN/1.73
GLUCOSE BLD-MCNC: 105 MG/DL (ref 70–100)
GLUCOSE BLDC GLUCOMTR-MCNC: 88 MG/DL (ref 70–130)
GLUCOSE BLDC GLUCOMTR-MCNC: 93 MG/DL (ref 70–130)
GLUCOSE BLDC GLUCOMTR-MCNC: 93 MG/DL (ref 70–130)
GLUCOSE BLDC GLUCOMTR-MCNC: 95 MG/DL (ref 70–130)
HBA1C MFR BLD: 5.6 %
HDLC SERPL-MCNC: 28 MG/DL
LDLC/HDLC SERPL: 2.05 {RATIO}
LEFT ATRIUM VOLUME INDEX: 27.2 ML/M2
LEFT ATRIUM VOLUME: 49.8 CM3
LV EF 2D ECHO EST: 60 %
MAGNESIUM SERPL-MCNC: 1.9 MG/DL (ref 1.4–2.2)
MAXIMAL PREDICTED HEART RATE: 152 BPM
POTASSIUM BLD-SCNC: 4 MMOL/L (ref 3.5–5.3)
SODIUM BLD-SCNC: 140 MMOL/L (ref 135–145)
STRESS TARGET HR: 129 BPM
TRIGL SERPL-MCNC: 148 MG/DL (ref 0–149)
TROPONIN I SERPL-MCNC: <0.012 NG/ML (ref 0–0.03)
TSH SERPL DL<=0.05 MIU/L-ACNC: 1.39 MIU/ML (ref 0.47–4.68)

## 2019-07-18 PROCEDURE — 82962 GLUCOSE BLOOD TEST: CPT

## 2019-07-18 PROCEDURE — 84484 ASSAY OF TROPONIN QUANT: CPT | Performed by: NURSE PRACTITIONER

## 2019-07-18 PROCEDURE — 93005 ELECTROCARDIOGRAM TRACING: CPT | Performed by: NURSE PRACTITIONER

## 2019-07-18 PROCEDURE — 80048 BASIC METABOLIC PNL TOTAL CA: CPT | Performed by: INTERNAL MEDICINE

## 2019-07-18 PROCEDURE — 83735 ASSAY OF MAGNESIUM: CPT | Performed by: INTERNAL MEDICINE

## 2019-07-18 PROCEDURE — 83036 HEMOGLOBIN GLYCOSYLATED A1C: CPT | Performed by: NURSE PRACTITIONER

## 2019-07-18 PROCEDURE — 93010 ELECTROCARDIOGRAM REPORT: CPT | Performed by: INTERNAL MEDICINE

## 2019-07-18 PROCEDURE — 93306 TTE W/DOPPLER COMPLETE: CPT

## 2019-07-18 PROCEDURE — G0378 HOSPITAL OBSERVATION PER HR: HCPCS

## 2019-07-18 PROCEDURE — 84443 ASSAY THYROID STIM HORMONE: CPT | Performed by: NURSE PRACTITIONER

## 2019-07-18 PROCEDURE — 36415 COLL VENOUS BLD VENIPUNCTURE: CPT | Performed by: NURSE PRACTITIONER

## 2019-07-18 PROCEDURE — 93306 TTE W/DOPPLER COMPLETE: CPT | Performed by: INTERNAL MEDICINE

## 2019-07-18 PROCEDURE — 99214 OFFICE O/P EST MOD 30 MIN: CPT | Performed by: INTERNAL MEDICINE

## 2019-07-18 PROCEDURE — 80061 LIPID PANEL: CPT | Performed by: NURSE PRACTITIONER

## 2019-07-18 RX ADMIN — PANTOPRAZOLE SODIUM 40 MG: 40 TABLET, DELAYED RELEASE ORAL at 09:20

## 2019-07-18 RX ADMIN — ATORVASTATIN CALCIUM 20 MG: 10 TABLET, FILM COATED ORAL at 21:07

## 2019-07-18 RX ADMIN — MAGNESIUM GLUCONATE 500 MG ORAL TABLET 400 MG: 500 TABLET ORAL at 21:07

## 2019-07-18 RX ADMIN — METOPROLOL TARTRATE 25 MG: 25 TABLET, FILM COATED ORAL at 10:48

## 2019-07-18 RX ADMIN — METFORMIN HYDROCHLORIDE 1000 MG: 500 TABLET ORAL at 17:03

## 2019-07-18 RX ADMIN — METOPROLOL TARTRATE 25 MG: 25 TABLET, FILM COATED ORAL at 21:07

## 2019-07-18 RX ADMIN — ATORVASTATIN CALCIUM 20 MG: 10 TABLET, FILM COATED ORAL at 09:20

## 2019-07-18 RX ADMIN — METFORMIN HYDROCHLORIDE 1000 MG: 500 TABLET ORAL at 09:20

## 2019-07-18 RX ADMIN — SODIUM CHLORIDE, PRESERVATIVE FREE 3 ML: 5 INJECTION INTRAVENOUS at 21:07

## 2019-07-18 RX ADMIN — CETIRIZINE HYDROCHLORIDE 10 MG: 10 TABLET, FILM COATED ORAL at 09:20

## 2019-07-18 RX ADMIN — MAGNESIUM GLUCONATE 500 MG ORAL TABLET 400 MG: 500 TABLET ORAL at 09:20

## 2019-07-18 RX ADMIN — RIVAROXABAN 20 MG: 20 TABLET, FILM COATED ORAL at 17:03

## 2019-07-18 RX ADMIN — SODIUM CHLORIDE, PRESERVATIVE FREE 3 ML: 5 INJECTION INTRAVENOUS at 09:23

## 2019-07-18 NOTE — PLAN OF CARE
Problem: Cardiac: ACS (Acute Coronary Syndrome) (Adult)  Goal: Signs and Symptoms of Listed Potential Problems Will be Absent, Minimized or Managed (Cardiac: ACS)  Outcome: Ongoing (interventions implemented as appropriate)   07/18/19 0632   Goal/Outcome Evaluation   Problems Assessed (Acute Coronary Syndrome) all   Problems Present (Acute Coronary Syn) chest pain (angina)       Problem: Patient Care Overview  Goal: Plan of Care Review  Outcome: Ongoing (interventions implemented as appropriate)   07/18/19 0632   Coping/Psychosocial   Plan of Care Reviewed With patient   Plan of Care Review   Progress no change   OTHER   Outcome Summary Pt. admitted to  for c/o of chest pain, palpitations, and overall feeling fatigued. Pt. states she has some L sided chest pain, rated 1 out of 10. VSS. Plans to consult Dr. Cassidy today. Sinus/Trigeminy 64-75 with frequent PVC's and PAC's on tele. Will continue to monitor and await cardiology consult for further orders.

## 2019-07-18 NOTE — PROGRESS NOTES
"    [facility] Medicine Services  INPATIENT PROGRESS NOTE    Patient Name: Terra Garcia  Date of Admission: 7/17/2019  Today's Date: 07/18/19  Length of Stay: 0  Primary Care Physician: Provider, No Known    Subjective   Chief Complaint: fatigue  HPI     Patient was seen and examined at bedside.  Patient is feeling a little bit better.  She just went out for echo, she received her first dose of metoprolol this morning.  Patient reports having symptoms of palpitations ongoing for a long time, she indicates that what really brought her in with the palpitations in the setting of fatigue.  She reports that this is the symptoms that she had when she was diagnosed with atrial fibrillation prior to her ablation.  She also felt this way 3 to 4 weeks after her ablation.  Patient is unsure if she has had any more atrial fibrillation since her ablation with Dr. Muir.  Patient also reports several vague nonacute symptoms such as occasionally having vision as though she is looking through a \"prism\" as well as occasionally having her thinking feel \"foggy\".  She indicates that for the sensation of her feeling \"foggy\" she had similar symptoms several years ago and went to hospital in [place] which a full work-up including MRI of the brain was found to be negative.    Patient last had an ophthalmology visit approximately 4 months ago she, she goes every 6 months due to increased intraocular pressure, but does not qualify for the diagnosis of glaucoma at this time.    Review of Systems   Constitutional: Positive for activity change and fatigue. Negative for appetite change, chills, diaphoresis and fever.   Eyes: Positive for visual disturbance.   Respiratory: Negative for cough and shortness of breath.    Cardiovascular: Positive for palpitations. Negative for chest pain.   Gastrointestinal: Negative for abdominal distention, abdominal pain, constipation, diarrhea, nausea and vomiting. "   Neurological: Positive for weakness.        All pertinent negatives and positives are as above. All other systems have been reviewed and are negative unless otherwise stated.     Objective    Temp:  [97.5 °F (36.4 °C)-97.8 °F (36.6 °C)] 97.8 °F (36.6 °C)  Heart Rate:  [63-83] 70  Resp:  [14-18] 18  BP: (109-158)/(59-82) 132/59  Physical Exam   Constitutional: She is oriented to person, place, and time. No distress.   HENT:   Head: Normocephalic and atraumatic.   Eyes: Conjunctivae are normal. No scleral icterus.   Neck: Neck supple. No JVD present.   Cardiovascular: Normal rate. Exam reveals no gallop and no friction rub.   No murmur heard.  Frequent PVCs noted during auscultation   Pulmonary/Chest: Effort normal and breath sounds normal. No stridor. No respiratory distress. She has no wheezes. She has no rales.   Abdominal: Soft. Bowel sounds are normal. She exhibits no distension. There is no tenderness. There is no guarding.   Musculoskeletal: She exhibits no edema.   Neurological: She is alert and oriented to person, place, and time.   Skin: Skin is warm and dry. She is not diaphoretic. No erythema.   Psychiatric: She has a normal mood and affect. Her behavior is normal.   Nursing note and vitals reviewed.          Results Review:  I have reviewed the labs, radiology results, and diagnostic studies.    Laboratory Data:   Results from last 7 days   Lab Units 07/17/19  1756   WBC 10*3/mm3 9.91   HEMOGLOBIN g/dL 14.3   HEMATOCRIT % 42.2   PLATELETS 10*3/mm3 265        Results from last 7 days   Lab Units 07/17/19  1756   SODIUM mmol/L 137   POTASSIUM mmol/L 3.9   CHLORIDE mmol/L 100   CO2 mmol/L 29.0   BUN mg/dL 15   CREATININE mg/dL 0.71   CALCIUM mg/dL 9.6   BILIRUBIN mg/dL 0.8   ALK PHOS U/L 47   ALT (SGPT) U/L 27   AST (SGOT) U/L 25   GLUCOSE mg/dL 113*       Culture Data:        Radiology Data:   Imaging Results (last 24 hours)     Procedure Component Value Units Date/Time    XR Chest 1 View [[id number]]  Collected:  07/17/19 1832     Updated:  07/17/19 1835    Narrative:       XR CHEST 1 VW- 7/17/2019 6:31 PM CDT     HISTORY: Chest Pain Triage Protocol       COMPARISON: 01/19/2017.     FINDINGS:   The lungs are clear. The cardiomediastinal silhouette and pulmonary  vascularity are unchanged.      The osseous structures and surrounding soft tissues demonstrate no acute  abnormality.       Impression:       1. No radiographic evidence of acute cardiopulmonary process.        This report was finalized on 07/17/2019 18:32 by Dr. Julio Garnica MD.          I have reviewed the patient's current medications.     Assessment/Plan     Active Hospital Problems    Diagnosis   • Chest pain, unspecified   • Palpitations   • PVC (premature ventricular contraction)   • Controlled type 2 diabetes mellitus without complication, without long-term current use of insulin (CMS/[facility])   • Hypertension       Plan:  1.  Dr. Cassidy saw this morning and started her on a low dose of metoprolol  2.  Telemetry reviewed, sinus rhythm with frequent PVCs  3.  May benefit from a ziopatch at discharge, will defer to cardiology  4.  Restart home medications  5.  Continue to monitor on telemetry  6.  Echo pending  7.  BMP and magnesium this morning  8.  AM BMP and magnesium   9.  Echo from 4/2019 reviewed.  10. A1c 5.6, TSH 1.39, lipid profile reviewed            Discharge Planning: I expect the patient to be discharged to home in 1 days.    Jered Chaparro MD   07/18/19   10:07 AM

## 2019-07-18 NOTE — H&P
"    [facility] Medicine Services  HISTORY AND PHYSICAL    Date of Admission: 7/17/2019  Primary Care Physician: Provider, No Known    Subjective     Chief Complaint: Chest discomfort, dyspnea, palpitations    History of Present Illness  Terra Garcia is a 68-year-old female with a past medical history of atrial fibrillation status post ablation 4/2018, controlled diabetes type 2, hyperlipidemia, hypertension, multiple thyroid nodules, chronic sinus issues.  Patient states she was in her usual state of health until approximately 7 to 10 days ago at which time she started to feel fatigued.  This is worsened until today she felt she was having heart trouble because she developed palpitations with this severe fatigability.  With history of atrial fibrillation and concerns for recurrence she did seek evaluation at [facility] emergency department.  ER evaluation revealed normal CMP and CBC, x-ray revealed normal sinus rhythm with trigeminy PVCs in the 70s.  Vital signs are stable.  Patient states since her diagnosis of diabetes sometime after her ablation last year she has lost 50 pounds and glucose is stable on metformin.  Currently patient is complaining of left chest achiness that is constant started today.  She had no relief after nitroglycerin.  She denies overt pain.  She reports dyspnea without significant shortness of breathing.  She denies diaphoresis or nausea.  She has had no changes in bowel or bladder habits.  She states she feels \"same as I did before my ablation\".  She is requesting to see Dr. Cassidy who is agreeable to see and consult if hospitalist will admit.  Patient has no abnormal cardiac findings at this time.  She is admitted as observation.    Review of Systems   A 10 point review of systems was completed, all negative except for those discussed in HPI    Past Medical History:   Past Medical History:   Diagnosis Date   • Allergic rhinitis    • Arthritis "    • Atrial fibrillation (CMS/HCC)    • Chronic sinusitis    • Diabetes mellitus (CMS/HCC)    • Dizziness    • Hyperlipidemia     Mixed   • Hypertension    • Hypertrophy of inferior nasal turbinate    • Imbalance    • Thyroid nodule    • Tremor        Past Surgical History:   Past Surgical History:   Procedure Laterality Date   • CARDIAC ABLATION     • TONSILLECTOMY     • TUBAL ABDOMINAL LIGATION         Family History: family history includes Atrial fibrillation in her father; Heart attack in her mother; Heart disease in her mother.    Social History:  reports that she has never smoked. She has never used smokeless tobacco. She reports that she drinks alcohol. Drug use questions deferred to the physician.    Code Status: Full, if unable speak for herself her  will speak for her      Allergies:  Allergies   Allergen Reactions   • Terramycin [Oxytetracycline]        Medications:  Prior to Admission medications    Medication Sig Start Date End Date Taking? Authorizing Provider   aspirin 81 MG EC tablet Take 81 mg by mouth Daily. Delayed Release (DR/EC)     Aurelio Cotter MD   atorvastatin (LIPITOR) 20 MG tablet Take 20 mg by mouth Every Night. 11/17/16   Aurelio Cotter MD   estradiol (VAGIFEM) 10 MCG tablet vaginal tablet Insert 1 tablet into the vagina 2 (Two) Times a Week. Mondays and Fridays    Aurelio Cotter MD   lansoprazole (PREVACID) 15 MG capsule Take 20 mg by mouth Daily.    Aurelio Cotter MD   loratadine (CLARITIN) 10 MG tablet Take 10 mg by mouth Daily.    Aurelio Cotter MD   magnesium oxide (MAGOX) 400 (241.3 MG) MG tablet tablet Take 400 mg by mouth 3 (Three) Times a Day.    Aurelio Cotter MD   metFORMIN (GLUCOPHAGE) 1000 MG tablet Take 1,000 mg by mouth 2 (Two) Times a Day With Meals.    Aurelio Cotter MD   Multiple Vitamins-Minerals (CENTRUM SILVER ULTRA WOMENS) tablet Take 1 tablet by mouth Daily.    Aurelio Cotter MD   Omega-3 Fatty Acids  "(FISH OIL PO) Take 1,200 mg by mouth 3 (Three) Times a Day.    Provider, MD Aurelio   rivaroxaban (XARELTO) 20 MG tablet Take 1 tablet by mouth Daily With Dinner. 1/20/17   Salma Green,        Objective     /78   Pulse 66   Temp 97.6 °F (36.4 °C) (Oral)   Resp 16   Ht 162.6 cm (64\")   Wt 74.8 kg (165 lb)   SpO2 95%   BMI 28.32 kg/m²   Physical Exam   Constitutional: She is oriented to person, place, and time. She appears well-developed and well-nourished. No distress.   HENT:   Head: Normocephalic and atraumatic.   Eyes: Conjunctivae and EOM are normal. Pupils are equal, round, and reactive to light. No scleral icterus.   Neck: Normal range of motion. Neck supple. No JVD present. No tracheal deviation present.   Cardiovascular: Normal rate, regular rhythm, normal heart sounds and intact distal pulses. Exam reveals no gallop.   No murmur heard.  NSR 70's with a trigeminy PVCs   Pulmonary/Chest: Effort normal and breath sounds normal. No respiratory distress. She has no wheezes. She has no rales.   Abdominal: Soft. Bowel sounds are normal. She exhibits no distension. There is no tenderness. There is no guarding.   Musculoskeletal: Normal range of motion. She exhibits no edema.   Neurological: She is alert and oriented to person, place, and time.   Skin: Skin is warm and dry. No rash noted. She is not diaphoretic. No erythema. No pallor.   Psychiatric: She has a normal mood and affect. Her behavior is normal.   Vitals reviewed.      Pertinent Data:   Lab Results (last 72 hours)     Procedure Component Value Units Date/Time    Troponin [[id number]]  (Normal) Collected:  07/17/19 2110    Specimen:  Blood Updated:  07/17/19 2140     Troponin I <0.012 ng/mL     Troponin [[id number]]  (Normal) Collected:  07/17/19 1756    Specimen:  Blood Updated:  07/17/19 1826     Troponin I <0.012 ng/mL     Comprehensive Metabolic Panel [[id number]]  (Abnormal) Collected:  07/17/19 1756    Specimen:  Blood Updated: "  07/17/19 1814     Glucose 113 mg/dL      BUN 15 mg/dL      Creatinine 0.71 mg/dL      Sodium 137 mmol/L      Potassium 3.9 mmol/L      Chloride 100 mmol/L      CO2 29.0 mmol/L      Calcium 9.6 mg/dL      Total Protein 6.6 g/dL      Albumin 4.00 g/dL      ALT (SGPT) 27 U/L      AST (SGOT) 25 U/L      Alkaline Phosphatase 47 U/L      Total Bilirubin 0.8 mg/dL      eGFR Non African Amer 82 mL/min/1.73      Globulin 2.6 gm/dL      A/G Ratio 1.5 g/dL      BUN/Creatinine Ratio 21.1     Anion Gap 8.0 mmol/L     CBC Auto Differential [[id number]]  (Normal) Collected:  07/17/19 1756    Specimen:  Blood Updated:  07/17/19 1811     WBC 9.91 10*3/mm3      RBC 4.61 10*6/mm3      Hemoglobin 14.3 g/dL      Hematocrit 42.2 %      MCV 91.5 fL      MCH 31.0 pg      MCHC 33.9 g/dL      RDW 12.0 %      RDW-SD 40.3 fl      MPV 9.9 fL      Platelets 265 10*3/mm3      Neutrophil % 71.3 %      Lymphocyte % 22.6 %      Monocyte % 4.3 %      Eosinophil % 1.2 %      Basophil % 0.3 %      Immature Grans % 0.3 %      Neutrophils, Absolute 7.06 10*3/mm3      Lymphocytes, Absolute 2.24 10*3/mm3      Monocytes, Absolute 0.43 10*3/mm3      Eosinophils, Absolute 0.12 10*3/mm3      Basophils, Absolute 0.03 10*3/mm3      Immature Grans, Absolute 0.03 10*3/mm3      nRBC 0.0 /100 WBC         Imaging Results (last 24 hours)     Procedure Component Value Units Date/Time    XR Chest 1 View [[id number]] Collected:  07/17/19 1832     Updated:  07/17/19 1835    Narrative:       XR CHEST 1 VW- 7/17/2019 6:31 PM CDT     HISTORY: Chest Pain Triage Protocol       COMPARISON: 01/19/2017.     FINDINGS:   The lungs are clear. The cardiomediastinal silhouette and pulmonary  vascularity are unchanged.      The osseous structures and surrounding soft tissues demonstrate no acute  abnormality.       Impression:       1. No radiographic evidence of acute cardiopulmonary process.        This report was finalized on 07/17/2019 18:32 by Dr. Julio Garnica MD.        I have  personally reviewed and interpreted the radiology studies and ECG obtained at time of admission.     Assessment / Plan     Assessment:   Active Hospital Problems    Diagnosis   • Chest pain, unspecified   • Palpitations   • PVC (premature ventricular contraction)   • Controlled type 2 diabetes mellitus without complication, without long-term current use of insulin (CMS/[facility])   • Hypertension   History of atrial fibrillation status post ablation  Chronic anticoagulation    Plan:   1.  Admit as observation  2.  Serial troponins and EKGs  3.  Home medications reviewed and restarted as appropriate  4.  DVT prophylaxis with ongoing Xarelto use  5.  Consult Dr. Cassidy in a.m.  6.  Labs in a.m.  7.  Cardiac studies per Dr. Cassidy recommendation    I discussed the patient's findings and my recommendations with: Richard Edge MD  Time spent 35 minutes      PORTER White  07/17/19   9:43 PM     I personally evaluated and examined the patient in conjunction with PORTER Wing and agree with the assessment, treatment plan, and disposition of the patient as recorded by her. My history, exam, and further recommendations are: I have reviewed and agree with the plans. John.         Richard Edge MD  07/18/19  6:02 AM     I personally evaluated and examined the patient in conjunction with PORTER Wing and agree with the assessment, treatment plan, and disposition of the patient as recorded by her. My history, exam, and further recommendations are: I have reviewed and agree with the plans. John.

## 2019-07-19 VITALS
BODY MASS INDEX: 28.95 KG/M2 | WEIGHT: 169.6 LBS | HEART RATE: 64 BPM | OXYGEN SATURATION: 97 % | HEIGHT: 64 IN | TEMPERATURE: 97.6 F | RESPIRATION RATE: 18 BRPM | DIASTOLIC BLOOD PRESSURE: 66 MMHG | SYSTOLIC BLOOD PRESSURE: 112 MMHG

## 2019-07-19 LAB
ANION GAP SERPL CALCULATED.3IONS-SCNC: 7 MMOL/L (ref 4–13)
BUN BLD-MCNC: 14 MG/DL (ref 5–21)
BUN/CREAT SERPL: 19.7 (ref 7–25)
CALCIUM SPEC-SCNC: 9.1 MG/DL (ref 8.4–10.4)
CHLORIDE SERPL-SCNC: 104 MMOL/L (ref 98–110)
CO2 SERPL-SCNC: 29 MMOL/L (ref 24–31)
CREAT BLD-MCNC: 0.71 MG/DL (ref 0.5–1.4)
GFR SERPL CREATININE-BSD FRML MDRD: 82 ML/MIN/1.73
GLUCOSE BLD-MCNC: 80 MG/DL (ref 70–100)
GLUCOSE BLDC GLUCOMTR-MCNC: 95 MG/DL (ref 70–130)
MAGNESIUM SERPL-MCNC: 1.8 MG/DL (ref 1.4–2.2)
POTASSIUM BLD-SCNC: 4.2 MMOL/L (ref 3.5–5.3)
SODIUM BLD-SCNC: 140 MMOL/L (ref 135–145)

## 2019-07-19 PROCEDURE — 99213 OFFICE O/P EST LOW 20 MIN: CPT | Performed by: INTERNAL MEDICINE

## 2019-07-19 PROCEDURE — G0378 HOSPITAL OBSERVATION PER HR: HCPCS

## 2019-07-19 PROCEDURE — 80048 BASIC METABOLIC PNL TOTAL CA: CPT | Performed by: INTERNAL MEDICINE

## 2019-07-19 PROCEDURE — 83735 ASSAY OF MAGNESIUM: CPT | Performed by: INTERNAL MEDICINE

## 2019-07-19 PROCEDURE — 82962 GLUCOSE BLOOD TEST: CPT

## 2019-07-19 RX ORDER — FLUTICASONE PROPIONATE 50 MCG
1 SPRAY, SUSPENSION (ML) NASAL DAILY PRN
COMMUNITY

## 2019-07-19 RX ORDER — MAGNESIUM OXIDE 400 MG/1
400 TABLET ORAL DAILY
COMMUNITY

## 2019-07-19 RX ADMIN — CETIRIZINE HYDROCHLORIDE 10 MG: 10 TABLET, FILM COATED ORAL at 09:05

## 2019-07-19 RX ADMIN — SODIUM CHLORIDE, PRESERVATIVE FREE 3 ML: 5 INJECTION INTRAVENOUS at 09:06

## 2019-07-19 RX ADMIN — MAGNESIUM GLUCONATE 500 MG ORAL TABLET 400 MG: 500 TABLET ORAL at 09:05

## 2019-07-19 RX ADMIN — METFORMIN HYDROCHLORIDE 1000 MG: 500 TABLET ORAL at 09:05

## 2019-07-19 RX ADMIN — METOPROLOL TARTRATE 25 MG: 25 TABLET, FILM COATED ORAL at 09:05

## 2019-07-19 RX ADMIN — ATORVASTATIN CALCIUM 20 MG: 10 TABLET, FILM COATED ORAL at 09:05

## 2019-07-19 RX ADMIN — PANTOPRAZOLE SODIUM 40 MG: 40 TABLET, DELAYED RELEASE ORAL at 09:05

## 2019-07-19 NOTE — DISCHARGE SUMMARY
[facility] Medicine Services  DISCHARGE SUMMARY       Date of Admission: 7/17/2019  Date of Discharge:  7/19/2019  Primary Care Physician: Provider, No Known    Presenting Problem/History of Present Illness:  Fatigue and palpitions    Final Discharge Diagnoses:  Active Hospital Problems    Diagnosis   • Fatigue   • Visual disturbance   • Chest pain, unspecified   • Palpitations   • PVC (premature ventricular contraction)   • Controlled type 2 diabetes mellitus without complication, without long-term current use of insulin (CMS/[facility])   • Hypertension       Consults: Consults    Procedures Performed: None    Pertinent Test Results:   Imaging Results (last 7 days)     Procedure Component Value Units Date/Time    XR Chest 1 View [[id number]] Collected:  07/17/19 1832     Updated:  07/17/19 1835    Narrative:       XR CHEST 1 VW- 7/17/2019 6:31 PM CDT     HISTORY: Chest Pain Triage Protocol       COMPARISON: 01/19/2017.     FINDINGS:   The lungs are clear. The cardiomediastinal silhouette and pulmonary  vascularity are unchanged.      The osseous structures and surrounding soft tissues demonstrate no acute  abnormality.       Impression:       1. No radiographic evidence of acute cardiopulmonary process.        This report was finalized on 07/17/2019 18:32 by Dr. Julio Garnica MD.        Lab Results (last 7 days)     Procedure Component Value Units Date/Time    POC Glucose Once [[id number]]  (Normal) Collected:  07/19/19 0828    Specimen:  Blood Updated:  07/19/19 0839     Glucose 95 mg/dL      Comment: : [id number] Emre (Aldridge) KaylieMeter ID: [id number]       Basic Metabolic Panel [[id number]]  (Normal) Collected:  07/19/19 0318    Specimen:  Blood Updated:  07/19/19 0452     Glucose 80 mg/dL      BUN 14 mg/dL      Creatinine 0.71 mg/dL      Sodium 140 mmol/L      Potassium 4.2 mmol/L      Chloride 104 mmol/L      CO2 29.0 mmol/L      Calcium 9.1 mg/dL      eGFR Non African  Amer 82 mL/min/1.73      BUN/Creatinine Ratio 19.7     Anion Gap 7.0 mmol/L     Narrative:       GFR Normal >60  Chronic Kidney Disease <60  Kidney Failure <15    Magnesium [[id number]]  (Normal) Collected:  07/19/19 0318    Specimen:  Blood Updated:  07/19/19 0452     Magnesium 1.8 mg/dL     POC Glucose Once [[id number]]  (Normal) Collected:  07/18/19 2015    Specimen:  Blood Updated:  07/18/19 2029     Glucose 95 mg/dL      Comment: : [id number] [place] WendyMeter ID: [id number]       POC Glucose Once [[id number]]  (Normal) Collected:  07/18/19 1733    Specimen:  Blood Updated:  07/18/19 1744     Glucose 93 mg/dL      Comment: : [id number] Etta TimothyMeter ID: [id number]       POC Glucose Once [[id number]]  (Normal) Collected:  07/18/19 1155    Specimen:  Blood Updated:  07/18/19 1207     Glucose 88 mg/dL      Comment: : [id number] Etta TimothyMeter ID: [id number]       Basic Metabolic Panel [[id number]]  (Abnormal) Collected:  07/18/19 0431    Specimen:  Blood Updated:  07/18/19 1107     Glucose 105 mg/dL      BUN 13 mg/dL      Creatinine 0.72 mg/dL      Sodium 140 mmol/L      Potassium 4.0 mmol/L      Chloride 104 mmol/L      CO2 30.0 mmol/L      Calcium 9.8 mg/dL      eGFR Non African Amer 81 mL/min/1.73      BUN/Creatinine Ratio 18.1     Anion Gap 6.0 mmol/L     Narrative:       GFR Normal >60  Chronic Kidney Disease <60  Kidney Failure <15    Magnesium [[id number]]  (Normal) Collected:  07/18/19 0431    Specimen:  Blood Updated:  07/18/19 1107     Magnesium 1.9 mg/dL     POC Glucose Once [[id number]]  (Normal) Collected:  07/18/19 0813    Specimen:  Blood Updated:  07/18/19 0824     Glucose 93 mg/dL      Comment: : [id number] Emre (Aldridge) KaylieMeter ID: [id number]       TSH [[id number]]  (Normal) Collected:  07/18/19 0431    Specimen:  Blood Updated:  07/18/19 0609     TSH 1.390 mIU/mL     Hemoglobin A1c [[id number]] Collected:  07/18/19 0431    Specimen:  Blood Updated:  07/18/19 0605      Hemoglobin A1C 5.6 %     Narrative:       Less than 6.0           Non-Diabetic Range  6.0-7.0                 ADA Therapeutic Target  Greater than 7.0        Action Suggested    Troponin [[id number]]  (Normal) Collected:  07/18/19 0431    Specimen:  Blood Updated:  07/18/19 0550     Troponin I <0.012 ng/mL     Lipid Panel [[id number]]  (Abnormal) Collected:  07/18/19 0431    Specimen:  Blood Updated:  07/18/19 0550     Total Cholesterol 115 mg/dL      Triglycerides 148 mg/dL      HDL Cholesterol 28 mg/dL      LDL Cholesterol  81 mg/dL      LDL/HDL Ratio 2.05    POC Glucose Once [[id number]]  (Normal) Collected:  07/17/19 2303    Specimen:  Blood Updated:  07/17/19 2315     Glucose 122 mg/dL      Comment: : [id number] Leonard Fall ID: [id number]       Troponin [[id number]]  (Normal) Collected:  07/17/19 2110    Specimen:  Blood Updated:  07/17/19 2140     Troponin I <0.012 ng/mL     [place] Draw [[id number]] Collected:  07/17/19 1756    Specimen:  Blood Updated:  07/17/19 1900    Narrative:       The following orders were created for panel order [place] Draw.  Procedure                               Abnormality         Status                     ---------                               -----------         ------                     Light Blue Top[[id number]]                                   Final result               Green Top (Gel)[[id number]]                                  Final result               Lavender Top[[id number]]                                     Final result               Red Top[[id number]]                                          Final result                 Please view results for these tests on the individual orders.    Light Blue Top [[id number]] Collected:  07/17/19 1756    Specimen:  Blood Updated:  07/17/19 1900     Extra Tube hold for add-on     Comment: Auto resulted       Green Top (Gel) [[id number]] Collected:  07/17/19 1756    Specimen:  Blood Updated:  07/17/19 1900     Extra Tube Hold  for add-ons.     Comment: Auto resulted.       Lavender Top [[id number]] Collected:  07/17/19 1756    Specimen:  Blood Updated:  07/17/19 1900     Extra Tube hold for add-on     Comment: Auto resulted       Red Top [[id number]] Collected:  07/17/19 1756    Specimen:  Blood Updated:  07/17/19 1900     Extra Tube Hold for add-ons.     Comment: Auto resulted.       Troponin [[id number]]  (Normal) Collected:  07/17/19 1756    Specimen:  Blood Updated:  07/17/19 1826     Troponin I <0.012 ng/mL     Comprehensive Metabolic Panel [[id number]]  (Abnormal) Collected:  07/17/19 1756    Specimen:  Blood Updated:  07/17/19 1814     Glucose 113 mg/dL      BUN 15 mg/dL      Creatinine 0.71 mg/dL      Sodium 137 mmol/L      Potassium 3.9 mmol/L      Chloride 100 mmol/L      CO2 29.0 mmol/L      Calcium 9.6 mg/dL      Total Protein 6.6 g/dL      Albumin 4.00 g/dL      ALT (SGPT) 27 U/L      AST (SGOT) 25 U/L      Alkaline Phosphatase 47 U/L      Total Bilirubin 0.8 mg/dL      eGFR Non African Amer 82 mL/min/1.73      Globulin 2.6 gm/dL      A/G Ratio 1.5 g/dL      BUN/Creatinine Ratio 21.1     Anion Gap 8.0 mmol/L     Narrative:       GFR Normal >60  Chronic Kidney Disease <60  Kidney Failure <15    CBC & Differential [[id number]] Collected:  07/17/19 1756    Specimen:  Blood Updated:  07/17/19 1811    Narrative:       The following orders were created for panel order CBC & Differential.  Procedure                               Abnormality         Status                     ---------                               -----------         ------                     CBC Auto Differential[[id number]]        Normal              Final result                 Please view results for these tests on the individual orders.    CBC Auto Differential [[id number]]  (Normal) Collected:  07/17/19 1756    Specimen:  Blood Updated:  07/17/19 1811     WBC 9.91 10*3/mm3      RBC 4.61 10*6/mm3      Hemoglobin 14.3 g/dL      Hematocrit 42.2 %      MCV 91.5 fL       "MCH 31.0 pg      MCHC 33.9 g/dL      RDW 12.0 %      RDW-SD 40.3 fl      MPV 9.9 fL      Platelets 265 10*3/mm3      Neutrophil % 71.3 %      Lymphocyte % 22.6 %      Monocyte % 4.3 %      Eosinophil % 1.2 %      Basophil % 0.3 %      Immature Grans % 0.3 %      Neutrophils, Absolute 7.06 10*3/mm3      Lymphocytes, Absolute 2.24 10*3/mm3      Monocytes, Absolute 0.43 10*3/mm3      Eosinophils, Absolute 0.12 10*3/mm3      Basophils, Absolute 0.03 10*3/mm3      Immature Grans, Absolute 0.03 10*3/mm3      nRBC 0.0 /100 WBC         Hospital Course:  The patient is a 68 y.o. female who presented to [facility] with palpitations and fatigue.    HPI per Dr. Edge 7/17/19:  \"Terra Garcia is a 68-year-old female with a past medical history of atrial fibrillation status post ablation 4/2018, controlled diabetes type 2, hyperlipidemia, hypertension, multiple thyroid nodules, chronic sinus issues.  Patient states she was in her usual state of health until approximately 7 to 10 days ago at which time she started to feel fatigued.  This is worsened until today she felt she was having heart trouble because she developed palpitations with this severe fatigability.  With history of atrial fibrillation and concerns for recurrence she did seek evaluation at [facility] emergency department.  ER evaluation revealed normal CMP and CBC, x-ray revealed normal sinus rhythm with trigeminy PVCs in the 70s.  Vital signs are stable.  Patient states since her diagnosis of diabetes sometime after her ablation last year she has lost 50 pounds and glucose is stable on metformin.  Currently patient is complaining of left chest achiness that is constant started today.  She had no relief after nitroglycerin.  She denies overt pain.  She reports dyspnea without significant shortness of breathing.  She denies diaphoresis or nausea.  She has had no changes in bowel or bladder habits.  She states she feels \"same as I did before " "my ablation\".  She is requesting to see Dr. Cassidy who is agreeable to see and consult if hospitalist will admit.  Patient has no abnormal cardiac findings at this time.  She is admitted as observation.\"    Hospital Course:  Patient monitored on telemetry and had frequent PVCs.  Dr. Cassidy started metoprolol which the patient tolerated well.  Symptoms have improved.  Patient has several other minor complaints including visual changes, fatigue, that I will defer to her PCP to address.      Patient questions answered on day of discharge.  Patient agreed with discharge.  Questions encouraged and answered.      Physical Exam on Discharge:  /66 (BP Location: Right arm, Patient Position: Lying)   Pulse 64   Temp 97.6 °F (36.4 °C) (Oral)   Resp 18   Ht 162.6 cm (64.02\")   Wt 76.9 kg (169 lb 9.6 oz)   SpO2 97%   BMI 29.10 kg/m²   Physical Exam  Constitutional: She is oriented to person, place, and time. No distress.   HENT:   Head: Normocephalic and atraumatic.   Eyes: Conjunctivae are normal. No scleral icterus.   Neck: Neck supple. No JVD present.   Cardiovascular: Normal rate. Exam reveals no gallop and no friction rub.   No murmur heard.  Pulmonary/Chest: Effort normal and breath sounds normal. No stridor. No respiratory distress. She has no wheezes. She has no rales.   Abdominal: Soft. Bowel sounds are normal. She exhibits no distension. There is no tenderness. There is no guarding.   Musculoskeletal: She exhibits no edema.   Neurological: She is alert and oriented to person, place, and time.   Skin: Skin is warm and dry. She is not diaphoretic. No erythema.   Psychiatric: She has a normal mood and affect. Her behavior is normal.   Nursing note and vitals reviewed.        Condition on Discharge: Stable    Discharge Disposition:  Home or Self Care    Discharge Medications:     Discharge Medications      New Medications      Instructions Start Date   metoprolol tartrate 25 MG tablet  Commonly known as:  " LOPRESSOR   25 mg, Oral, Every 12 Hours Scheduled         Continue These Medications      Instructions Start Date   aspirin 81 MG EC tablet   81 mg, Oral, Daily, Delayed Release (DR/EC)      atorvastatin 20 MG tablet  Commonly known as:  LIPITOR   20 mg, Oral, Nightly      CENTRUM SILVER ULTRA WOMENS tablet   1 tablet, Oral, Daily      estradiol 10 MCG tablet vaginal tablet  Commonly known as:  VAGIFEM   1 tablet, Vaginal, 2 Times Weekly, Mondays and Fridays       FISH OIL PO   1,200 mg, Oral, 2 Times Daily      fluticasone 27.5 MCG/SPRAY nasal spray  Commonly known as:  VERAMYST   2 sprays, Nasal, Daily      lansoprazole 15 MG capsule  Commonly known as:  PREVACID   15 mg, Oral, Daily      loratadine 10 MG tablet  Commonly known as:  CLARITIN   10 mg, Oral, Daily      magnesium oxide 400 (241.3 Mg) MG tablet tablet  Commonly known as:  MAGOX   400 mg, Oral, 2 Times Daily      metFORMIN 1000 MG tablet  Commonly known as:  GLUCOPHAGE   1,000 mg, Oral, 2 Times Daily With Meals      rivaroxaban 20 MG tablet  Commonly known as:  XARELTO   20 mg, Oral, Daily With Dinner           Discharge Diet:   Diet Instructions     Diet: Regular, Cardiac; Thin      Discharge Diet:   Regular  Cardiac       Fluid Consistency:  Thin        Activity at Discharge:   Activity Instructions     Activity as Tolerated            Follow-up Appointments:   Future Appointments   Date Time Provider Department Center   7/24/2019 12:15 PM Dionicio Cassidy MD [facility]   9/26/2019  1:00 PM PAD BIC US 2 BH PAD US BI PAD   9/26/2019  1:30 PM Mercedes Anderson APRN [facility] ENT PAD None       Test Results Pending at Discharge: None    Jered Chaparro MD  07/19/19  9:52 AM    Time: 25 minutes.

## 2019-07-19 NOTE — CONSULTS
LOS: 0 days   Patient Care Team:  Provider, No Known as PCP - Cuco Mcdonald MD as Consulting Physician (Otolaryngology)  Dionicio Cassidy MD as Cardiologist (Cardiology)    Chief Complaint:   Hypertension    Controlled type 2 diabetes mellitus without complication, without long-term current use of insulin (CMS/[facility])    PVC (premature ventricular contraction)    Palpitations    Chest pain, unspecified    Fatigue    Visual disturbance    Reason for consultation: Palpitation and atypical chest pain.    History of current illness    Exceedingly pleasant 68-year-old  lady who is well-known to me and has had low risk stress test in the past.  She is admitted with complaints of intermittent fluttering sensation in her chest.  She has had atrial fibrillation ablation.  She is compliant with prescribed medication regimen.  There is no presyncope syncope.  At times she has substernal chest pain at rest.  This is not necessarily with exertion.  This discomfort is at times transient and at other times his prolonged with no obvious precipitating or relieving factors.  This chest discomfort can last for hours at a time.    Chest pain is non-positional and non-pleuritic  Chest pain is non-gustatory nature  Currently patient asymptomatic and at baseline.  She is resting comfortably.  She is somewhat anxious  Has easy fatigability  At times she feels with palpitations she gets somewhat dizzy.  No associated chest pain with palpitations any presyncope syncope or orthopnea or paroxysmal nocturnal dyspnea.      Patient Complaints:   Chief Complaint   Patient presents with   • Palpitations       Telemetry: no malignant arrhythmia. No significant pauses.  Sinus rhythm with atrial and ventricular ectopy.  No supraventricular ventricular tachyarrhythmia or elisabet-arrhythmia  Review of Systems     Constitutional: No chills   Has fatigue   No fever.     HENT: Negative.    Eyes: Negative.      Respiratory: Negative for  cough,   No chest wall soreness,   Shortness of breath,   no wheezing, no stridor.      Cardiovascular: Atypical chest pain.  Palpitations    Gastrointestinal: Negative for abdominal distention,  No abdominal pain,   No blood in stool,   No constipation,   No diarrhea,   No nausea   No vomiting.     Endocrine: Negative.    Genitourinary: Negative for difficulty urinating, dysuria, flank pain and hematuria.     Musculoskeletal: Negative.    Skin: Negative for rash and wound.   Allergic/Immunologic: Negative.      Neurological: Negative for dizziness, syncope, weakness,   No light-headedness  No  headaches.     Hematological: Does not bruise/bleed easily.     Psychiatric/Behavioral: Negative for agitation or behavioral problems,   No confusion,   the patient is  nervous/anxious.       History:   Past Medical History:   Diagnosis Date   • Allergic rhinitis    • Arthritis    • Atrial fibrillation (CMS/HCC)    • Chronic sinusitis    • Diabetes mellitus (CMS/HCC)    • Dizziness    • Hyperlipidemia     Mixed   • Hypertension    • Hypertrophy of inferior nasal turbinate    • Imbalance    • Thyroid nodule    • Tremor      Past Surgical History:   Procedure Laterality Date   • CARDIAC ABLATION     • TONSILLECTOMY     • TUBAL ABDOMINAL LIGATION       Social History     Socioeconomic History   • Marital status:      Spouse name: Not on file   • Number of children: Not on file   • Years of education: Not on file   • Highest education level: Not on file   Tobacco Use   • Smoking status: Never Smoker   • Smokeless tobacco: Never Used   Substance and Sexual Activity   • Alcohol use: Yes     Comment: RARELY   • Drug use: Defer   • Sexual activity: Defer     Family History   Problem Relation Age of Onset   • Heart disease Mother    • Heart attack Mother    • Atrial fibrillation Father        Labs:  WBC WBC   Date Value Ref Range Status   07/17/2019 9.91 4.80 - 10.80 10*3/mm3 Final      HGB Hemoglobin   Date Value Ref Range  Status   07/17/2019 14.3 12.0 - 16.0 g/dL Final      HCT Hematocrit   Date Value Ref Range Status   07/17/2019 42.2 37.0 - 47.0 % Final      Platelets Platelets   Date Value Ref Range Status   07/17/2019 265 130 - 400 10*3/mm3 Final      MCV MCV   Date Value Ref Range Status   07/17/2019 91.5 82.0 - 98.0 fL Final        Results from last 7 days   Lab Units 07/18/19  0431 07/17/19  1756   SODIUM mmol/L 140 137   POTASSIUM mmol/L 4.0 3.9   CHLORIDE mmol/L 104 100   CO2 mmol/L 30.0 29.0   BUN mg/dL 13 15   CREATININE mg/dL 0.72 0.71   CALCIUM mg/dL 9.8 9.6   BILIRUBIN mg/dL  --  0.8   ALK PHOS U/L  --  47   ALT (SGPT) U/L  --  27   AST (SGOT) U/L  --  25   GLUCOSE mg/dL 105* 113*     Lab Results   Component Value Date    TROPONINI <0.012 07/18/2019     PT/INR:  No results found for: PROTIME/No results found for: INR    Imaging Results (last 72 hours)     Procedure Component Value Units Date/Time    XR Chest 1 View [[id number]] Collected:  07/17/19 1832     Updated:  07/17/19 1835    Narrative:       XR CHEST 1 VW- 7/17/2019 6:31 PM CDT     HISTORY: Chest Pain Triage Protocol       COMPARISON: 01/19/2017.     FINDINGS:   The lungs are clear. The cardiomediastinal silhouette and pulmonary  vascularity are unchanged.      The osseous structures and surrounding soft tissues demonstrate no acute  abnormality.       Impression:       1. No radiographic evidence of acute cardiopulmonary process.        This report was finalized on 07/17/2019 18:32 by Dr. Julio Garnica MD.          Objective     Allergies   Allergen Reactions   • Terramycin [Oxytetracycline]        Medication Review: Performed  Current Facility-Administered Medications   Medication Dose Route Frequency Provider Last Rate Last Dose   • acetaminophen (TYLENOL) tablet 650 mg  650 mg Oral Q4H PRN Ave Farah, APRN       • atorvastatin (LIPITOR) tablet 20 mg  20 mg Oral BID Ave Farah APRN   20 mg at 07/18/19 0920   • cetirizine (zyrTEC) tablet 10 mg   10 mg Oral Daily Ave Farah APRN   10 mg at 07/18/19 0920   • dextrose (D50W) 25 g/ 50mL Intravenous Solution 25 g  25 g Intravenous Q15 Min PRN Ave Farah APRN       • dextrose (GLUTOSE) oral gel 15 g  15 g Oral Q15 Min PRN Ave Farah, APRN       • glucagon (human recombinant) (GLUCAGEN DIAGNOSTIC) injection 1 mg  1 mg Subcutaneous PRN Ave Farah APRAMBER       • insulin lispro (humaLOG) injection 2-7 Units  2-7 Units Subcutaneous 4x Daily With Meals & Nightly Ave Farah, APRN       • magnesium oxide (MAGOX) tablet 400 mg  400 mg Oral BID Ave Farah APRN   400 mg at 07/18/19 0920   • metFORMIN (GLUCOPHAGE) tablet 1,000 mg  1,000 mg Oral BID With Meals Ave Farah APRN   1,000 mg at 07/18/19 1703   • metoprolol tartrate (LOPRESSOR) tablet 25 mg  25 mg Oral Q12H Dionicio Cassidy MD   25 mg at 07/18/19 1048   • nitroglycerin (NITROSTAT) SL tablet 0.4 mg  0.4 mg Sublingual Q5 Min PRN Ave Farah APRN       • ondansetron (ZOFRAN) tablet 4 mg  4 mg Oral Q6H PRN Ave Farah APRN        Or   • ondansetron (ZOFRAN) injection 4 mg  4 mg Intravenous Q6H PRN Ave Farah, APRN       • pantoprazole (PROTONIX) EC tablet 40 mg  40 mg Oral Daily Ave Farah APRN   40 mg at 07/18/19 0920   • rivaroxaban (XARELTO) tablet 20 mg  20 mg Oral Daily With Dinner Ave Farah APRN   20 mg at 07/18/19 1703   • sodium chloride 0.9 % flush 10 mL  10 mL Intravenous PRN Ulises Lopez MD       • sodium chloride 0.9 % flush 3 mL  3 mL Intravenous Q12H Ave Farah APRN   3 mL at 07/18/19 0923   • sodium chloride 0.9 % flush 3-10 mL  3-10 mL Intravenous PRN Ave Farah APRN           Vital Sign Min/Max for last 24 hours  Temp  Min: 97.5 °F (36.4 °C)  Max: 98.1 °F (36.7 °C)   BP  Min: 109/69  Max: 152/81   Pulse  Min: 62  Max: 75   Resp  Min: 14  Max: 20   SpO2  Min: 79 %  Max: 100 %   No Data Recorded   Weight  Min: 77.1 kg (170 lb)  Max: 77.1 kg (170 lb)  "    Flowsheet Rows      First Filed Value   Admission Height  162.6 cm (64\") Documented at 07/17/2019 1743   Admission Weight  74.8 kg (165 lb) Documented at 07/17/2019 1743          Results for orders placed in visit on 04/26/19   SCANNED - ECHOCARDIOGRAM       Physical Exam:    General Appearance: Awake, alert, in no acute distress  Eyes: Pupils equal and reactive    Ears: Appear intact with no abnormalities noted  Nose: Nares normal, no drainage  Neck: supple, trachea midline, no carotid bruit and no JVD  Back: no kyphosis present,    Lungs: respirations regular, respirations even and respirations unlabored    Heart: normal S1, S2,  2/6 pansystolic murmur in the left sternal border,  no rub and no click    Abdomen: normal bowel sounds, no tenderness   Skin: no bleeding, bruising or rash  Extremities: no cyanosis  Psychiatric/Behavioral: Negative for agitation, behavioral problems, confusion, the patient does  appear to be nervous/anxious.          Results Review:   I reviewed the patient's new clinical results.  I reviewed the patient's new imaging results and agree with the interpretation.  I reviewed the patient's other test results and agree with the interpretation  I personally viewed and interpreted the patient's EKG/Telemetry data  Discussed with patient    Reviewed available prior notes, consults, prior visits, laboratory findings, radiology and cardiology relevant reports. Updated chart as applicable. I have reviewed the patient's medical history in detail and updated the computerized patient record as relevant.      Updated patient regarding any new or relevant abnormalities on review of records or any new findings on physical exam. Mentioned to patient about purpose of visit and desirable health short and long term goals and objectives.     Assessment/Plan       Hypertension    Controlled type 2 diabetes mellitus without complication, without long-term current use of insulin (CMS/[facility])    PVC (premature " ventricular contraction)    Palpitations    Chest pain, unspecified    Fatigue    Visual disturbance      Plan    Check echocardiogram  Start Lopressor 25 mg p.o. twice daily  If symptoms improve and no evidence of exertional chest discomfort can be discharged home tomorrow    Supportive care  Telemetry  Optimal medical therapy    Deep vein thrombosis prophylaxis/precautions  Appropriate diet, fluid, sodium, caffeine, stimulants intake     Questions were encouraged, asked and answered to the patient's  understanding and satisfaction.    Compliance to diet and medications   Avoid NSAIDS    Dionicio Cassidy MD  07/18/19  7:17 PM    EMR Dragon/Transcription was used to dictate part of this note

## 2019-07-19 NOTE — PLAN OF CARE
Problem: Cardiac: ACS (Acute Coronary Syndrome) (Adult)  Goal: Signs and Symptoms of Listed Potential Problems Will be Absent, Minimized or Managed (Cardiac: ACS)  Outcome: Ongoing (interventions implemented as appropriate)   07/19/19 0607   Goal/Outcome Evaluation   Problems Assessed (Acute Coronary Syndrome) all   Problems Present (Acute Coronary Syn) none       Problem: Patient Care Overview  Goal: Plan of Care Review  Outcome: Ongoing (interventions implemented as appropriate)   07/19/19 0607   Coping/Psychosocial   Plan of Care Reviewed With patient   Plan of Care Review   Progress improving   OTHER   Outcome Summary VSS, no c/o pain, EF 60%, up ad marcie, posibble DC to home today, will continue to monitor for changes.

## 2019-07-19 NOTE — PROGRESS NOTES
LOS: 0 days   Patient Care Team:  Provider, No Known as PCP - Cuco Mcdonald MD as Consulting Physician (Otolaryngology)  Dionicoi Cassidy MD as Cardiologist (Cardiology)    Chief Complaint:   Hypertension    Controlled type 2 diabetes mellitus without complication, without long-term current use of insulin (CMS/[facility])    PVC (premature ventricular contraction)    Palpitations    Chest pain, unspecified    Fatigue    Visual disturbance    Shortness of breath    Subjective      Overall dramatically better  Symptoms have completely resolved with low-dose beta-blocker therapy  Ambulating vigorously with no chest pain no palpitations  Telemetry shows isolated premature monomorphic ventricular contractions.  Patient does not have any palpitations  No chest pain  No excessive shortness of breath  Wants to be discharged home  Interval History: Improved overall    Patient Complaints:   Chief Complaint   Patient presents with   • Palpitations       Telemetry: no malignant arrhythmia. No significant pauses.  Monomorphic isolated premature ventricular contractions    Review of Systems     Constitutional: No chills   Has fatigue   No fever.     HENT: Negative.    Eyes: Negative.      Respiratory: Negative for cough,   No chest wall soreness,   Shortness of breath,   no wheezing, no stridor.      Cardiovascular: Negative for chest pain,   No palpitations   No significant  leg swelling.     Gastrointestinal: Negative for abdominal distention,  No abdominal pain,   No blood in stool,   No constipation,   No diarrhea,   No nausea   No vomiting.     Endocrine: Negative.    Genitourinary: Negative for difficulty urinating, dysuria, flank pain and hematuria.     Musculoskeletal: Negative.    Skin: Negative for rash and wound.   Allergic/Immunologic: Negative.      Neurological: Negative for dizziness, syncope, weakness,   No light-headedness  No  headaches.     Hematological: Does not bruise/bleed easily.      Psychiatric/Behavioral: Negative for agitation or behavioral problems,   No confusion,   the patient is  nervous/anxious.       History:   Past Medical History:   Diagnosis Date   • Allergic rhinitis    • Arthritis    • Atrial fibrillation (CMS/HCC)    • Chronic sinusitis    • Diabetes mellitus (CMS/HCC)    • Dizziness    • Hyperlipidemia     Mixed   • Hypertension    • Hypertrophy of inferior nasal turbinate    • Imbalance    • Thyroid nodule    • Tremor      Past Surgical History:   Procedure Laterality Date   • CARDIAC ABLATION     • TONSILLECTOMY     • TUBAL ABDOMINAL LIGATION       Social History     Socioeconomic History   • Marital status:      Spouse name: Not on file   • Number of children: Not on file   • Years of education: Not on file   • Highest education level: Not on file   Tobacco Use   • Smoking status: Never Smoker   • Smokeless tobacco: Never Used   Substance and Sexual Activity   • Alcohol use: Yes     Comment: RARELY   • Drug use: Defer   • Sexual activity: Defer     Family History   Problem Relation Age of Onset   • Heart disease Mother    • Heart attack Mother    • Atrial fibrillation Father        Labs:  WBC WBC   Date Value Ref Range Status   07/17/2019 9.91 4.80 - 10.80 10*3/mm3 Final      HGB Hemoglobin   Date Value Ref Range Status   07/17/2019 14.3 12.0 - 16.0 g/dL Final      HCT Hematocrit   Date Value Ref Range Status   07/17/2019 42.2 37.0 - 47.0 % Final      Platelets Platelets   Date Value Ref Range Status   07/17/2019 265 130 - 400 10*3/mm3 Final      MCV MCV   Date Value Ref Range Status   07/17/2019 91.5 82.0 - 98.0 fL Final        Results from last 7 days   Lab Units 07/19/19  0318 07/18/19  0431 07/17/19  1756   SODIUM mmol/L 140 140 137   POTASSIUM mmol/L 4.2 4.0 3.9   CHLORIDE mmol/L 104 104 100   CO2 mmol/L 29.0 30.0 29.0   BUN mg/dL 14 13 15   CREATININE mg/dL 0.71 0.72 0.71   CALCIUM mg/dL 9.1 9.8 9.6   BILIRUBIN mg/dL  --   --  0.8   ALK PHOS U/L  --   --  47    ALT (SGPT) U/L  --   --  27   AST (SGOT) U/L  --   --  25   GLUCOSE mg/dL 80 105* 113*     Lab Results   Component Value Date    TROPONINI <0.012 07/18/2019     PT/INR:  No results found for: PROTIME/No results found for: INR    Imaging Results (last 72 hours)     Procedure Component Value Units Date/Time    XR Chest 1 View [[id number]] Collected:  07/17/19 1832     Updated:  07/17/19 1835    Narrative:       XR CHEST 1 VW- 7/17/2019 6:31 PM CDT     HISTORY: Chest Pain Triage Protocol       COMPARISON: 01/19/2017.     FINDINGS:   The lungs are clear. The cardiomediastinal silhouette and pulmonary  vascularity are unchanged.      The osseous structures and surrounding soft tissues demonstrate no acute  abnormality.       Impression:       1. No radiographic evidence of acute cardiopulmonary process.        This report was finalized on 07/17/2019 18:32 by Dr. Julio Garnica MD.          Objective     Allergies   Allergen Reactions   • Terramycin [Oxytetracycline]        Medication Review: Performed  Current Facility-Administered Medications   Medication Dose Route Frequency Provider Last Rate Last Dose   • acetaminophen (TYLENOL) tablet 650 mg  650 mg Oral Q4H PRN Ave Farah APRN       • atorvastatin (LIPITOR) tablet 20 mg  20 mg Oral BID Ave Farah APRN   20 mg at 07/18/19 2107   • cetirizine (zyrTEC) tablet 10 mg  10 mg Oral Daily Ave Farah APRN   10 mg at 07/18/19 0920   • dextrose (D50W) 25 g/ 50mL Intravenous Solution 25 g  25 g Intravenous Q15 Min PRN Ave Farah APRN       • dextrose (GLUTOSE) oral gel 15 g  15 g Oral Q15 Min PRN Ave Farah APRN       • glucagon (human recombinant) (GLUCAGEN DIAGNOSTIC) injection 1 mg  1 mg Subcutaneous PRN Ave Farah APRN       • insulin lispro (humaLOG) injection 2-7 Units  2-7 Units Subcutaneous 4x Daily With Meals & Nightly Ave Farah APRN       • magnesium oxide (MAGOX) tablet 400 mg  400 mg Oral BID Ave Farah  "PORTER FRANK   400 mg at 07/18/19 2107   • metFORMIN (GLUCOPHAGE) tablet 1,000 mg  1,000 mg Oral BID With Meals Ave Farah APRN   1,000 mg at 07/18/19 1703   • metoprolol tartrate (LOPRESSOR) tablet 25 mg  25 mg Oral Q12H Dionicio Cassidy MD   25 mg at 07/18/19 2107   • nitroglycerin (NITROSTAT) SL tablet 0.4 mg  0.4 mg Sublingual Q5 Min PRN Ave Farah APRN       • ondansetron (ZOFRAN) tablet 4 mg  4 mg Oral Q6H PRN Ave Farah APRN        Or   • ondansetron (ZOFRAN) injection 4 mg  4 mg Intravenous Q6H PRN Ave Farah APRN       • pantoprazole (PROTONIX) EC tablet 40 mg  40 mg Oral Daily Ave Farah APRN   40 mg at 07/18/19 0920   • rivaroxaban (XARELTO) tablet 20 mg  20 mg Oral Daily With Dinner Ave Farah APRN   20 mg at 07/18/19 1703   • sodium chloride 0.9 % flush 10 mL  10 mL Intravenous PRN Ulises Lopez MD       • sodium chloride 0.9 % flush 3 mL  3 mL Intravenous Q12H Ave Farah APRN   3 mL at 07/18/19 2107   • sodium chloride 0.9 % flush 3-10 mL  3-10 mL Intravenous PRN Ave Farah APRN           Vital Sign Min/Max for last 24 hours  Temp  Min: 97.5 °F (36.4 °C)  Max: 98.1 °F (36.7 °C)   BP  Min: 102/69  Max: 131/84   Pulse  Min: 59  Max: 75   Resp  Min: 18  Max: 20   SpO2  Min: 95 %  Max: 100 %   No Data Recorded   Weight  Min: 76.9 kg (169 lb 9.6 oz)  Max: 76.9 kg (169 lb 9.6 oz)     Flowsheet Rows      First Filed Value   Admission Height  162.6 cm (64\") Documented at 07/17/2019 1743   Admission Weight  74.8 kg (165 lb) Documented at 07/17/2019 1743          Results for orders placed during the hospital encounter of 07/17/19   Adult Transthoracic Echo Complete W/ Cont if Necessary Per Protocol    Narrative · Estimated EF = 60%.  · Left ventricular wall thickness is consistent with hypertrophy.   Sigmoid-shaped ventricular septum is present.  · Left ventricular diastolic dysfunction.  · Mild pulmonary hypertension is present  · Small patent foramen " ovale present with right to left shunting indicated   by color flow Doppler.  · There is no evidence of pericardial effusion.          Physical Exam:    General Appearance: Awake, alert, in no acute distress  Eyes: Pupils equal and reactive    Ears: Appear intact with no abnormalities noted  Nose: Nares normal, no drainage  Neck: supple, trachea midline, no carotid bruit and no JVD  Back: no kyphosis present,    Lungs: respirations regular, respirations even and respirations unlabored    Heart: normal S1, S2,  2/6 pansystolic murmur in the left sternal border,  no rub and no click    Abdomen: normal bowel sounds, no tenderness   Skin: no bleeding, bruising or rash  Extremities: no cyanosis  Psychiatric/Behavioral: Negative for agitation, behavioral problems, confusion, the patient does  appear to be nervous/anxious.          Results Review:   I reviewed the patient's new clinical results.  I reviewed the patient's new imaging results and agree with the interpretation.  I reviewed the patient's other test results and agree with the interpretation  I personally viewed and interpreted the patient's EKG/Telemetry data  Discussed with patient    Reviewed available prior notes, consults, prior visits, laboratory findings, radiology and cardiology relevant reports. Updated chart as applicable. I have reviewed the patient's medical history in detail and updated the computerized patient record as relevant.      Updated patient regarding any new or relevant abnormalities on review of records or any new findings on physical exam. Mentioned to patient about purpose of visit and desirable health short and long term goals and objectives.     Assessment/Plan       Hypertension    Controlled type 2 diabetes mellitus without complication, without long-term current use of insulin (CMS/[facility])    PVC (premature ventricular contraction)    Palpitations    Chest pain, unspecified    Fatigue    Visual disturbance  Small patent foramen ovale  likely due to transseptal puncture from cardiac llation ablation      Plan    Continue low-dose beta-blocker therapy  Holter monitor in 1 to 2 weeks and this can be done in [facility]  Keep follow-up appointment in approximately 3 months with me  Currently no additional cardiac testing required  Small patent foramen ovale was noted this is likely due to the transseptal puncture due to atrial fibrillation ablation and no intervention for this required  Verbally told patient that when she gets IV fluids or any intravenous injection to inform nursing that they need to be careful that there is no air bubbles and any intravenous solutions.      OK to discharge  Low salt cardiac diet.   Discharge to home and or self care with improvement or resolution of presenting symptoms or complaints  Ambulating  Optimal medical therapy    Deep vein thrombosis precautions with hydration and ambulation  Counseled regarding disease appropriate diet, fluid, sodium, caffeine, stimulants intake   Stressed compliance to diet and medications   Avoid NSAIDS    Follow up with primary provider and primary cardiologist as scheduled   Overall improved and deemed fit for discharge  Will be provided with written discharge instructions including diet and medications including prescriptions as required and labs as applicable  Go to nearest emergency room if recurrence of primary complaints or any suspected disabling or life threatening symptoms or complaints such as chest pain, increasing shortness of breath, profound dizziness, weakness, significant palpitations, passing out episodes, cold sweats, excessive nausea etc      Dionicio Cassidy MD  07/19/19  8:16 AM    EMR Dragon/Transcription was used to dictate part of this note

## 2019-07-22 ENCOUNTER — TELEPHONE (OUTPATIENT)
Dept: CARDIOLOGY | Facility: CLINIC | Age: 69
End: 2019-07-22

## 2019-07-22 NOTE — TELEPHONE ENCOUNTER
PT WAS SEEN IN THE HOSPITAL, AND STATED YOU WANTED A HOLTER ORDERED.  PT IS DOING THIS AT [facility] AND DID NOT GET AN ORDER AT DISCHARGE.    PLEASE ADVISE

## 2019-07-24 DIAGNOSIS — R00.2 PALPITATIONS: Primary | ICD-10-CM

## 2019-08-19 ENCOUNTER — OFFICE VISIT (OUTPATIENT)
Dept: CARDIOLOGY | Facility: CLINIC | Age: 69
End: 2019-08-19

## 2019-08-19 VITALS
SYSTOLIC BLOOD PRESSURE: 120 MMHG | BODY MASS INDEX: 29.37 KG/M2 | HEIGHT: 64 IN | DIASTOLIC BLOOD PRESSURE: 72 MMHG | HEART RATE: 72 BPM | OXYGEN SATURATION: 96 % | WEIGHT: 172 LBS

## 2019-08-19 DIAGNOSIS — R53.83 OTHER FATIGUE: ICD-10-CM

## 2019-08-19 DIAGNOSIS — H53.9 VISUAL DISTURBANCE: ICD-10-CM

## 2019-08-19 DIAGNOSIS — I48.0 PAROXYSMAL ATRIAL FIBRILLATION (HCC): ICD-10-CM

## 2019-08-19 DIAGNOSIS — R42 DIZZY SPELLS: ICD-10-CM

## 2019-08-19 DIAGNOSIS — E11.9 CONTROLLED TYPE 2 DIABETES MELLITUS WITHOUT COMPLICATION, WITHOUT LONG-TERM CURRENT USE OF INSULIN (HCC): ICD-10-CM

## 2019-08-19 DIAGNOSIS — R00.2 PALPITATIONS: Primary | ICD-10-CM

## 2019-08-19 DIAGNOSIS — I10 ESSENTIAL HYPERTENSION: ICD-10-CM

## 2019-08-19 DIAGNOSIS — I49.3 PVC (PREMATURE VENTRICULAR CONTRACTION): ICD-10-CM

## 2019-08-19 DIAGNOSIS — I51.7 LVH (LEFT VENTRICULAR HYPERTROPHY): ICD-10-CM

## 2019-08-19 DIAGNOSIS — R53.82 CHRONIC FATIGUE: ICD-10-CM

## 2019-08-19 PROBLEM — R07.9 CHEST PAIN, UNSPECIFIED: Status: RESOLVED | Noted: 2019-07-17 | Resolved: 2019-08-19

## 2019-08-19 PROCEDURE — 99214 OFFICE O/P EST MOD 30 MIN: CPT | Performed by: INTERNAL MEDICINE

## 2019-08-19 PROCEDURE — 93000 ELECTROCARDIOGRAM COMPLETE: CPT | Performed by: INTERNAL MEDICINE

## 2019-08-19 NOTE — PROGRESS NOTES
Terra Garcia  [id number]  1950  69 y.o.  female    Referring Provider: Provider, No Known    Reason for Follow-up Visit:  Routine follow up.  Paroxysmal atrial fibrillation s/p AF ablation    Subjective    Overall much more tired  Disabling fatigue  No chest pain or shortness of breath     Occasional palpitations, once every several weeks lasting for less than 1 minute  No associated symptoms of dizziness, weakness, chest pain,  shortness of breath      No significant pedal edema    Compliant with medications and dietary advice  Decreased effort tolerance    No presyncope or syncope  Compliant with medications      Recent TSH normal   Labs OK   A1c 5.6    Recent holter in [place] shows brief SVT       History of present illness:  Terra Garcia is a 69 y.o. yo female with history of Paroxysmal atrial fibrillation  who presents today for   Chief Complaint   Patient presents with   • Atrial Fibrillation     4 mo f/u   .    History  Past Medical History:   Diagnosis Date   • Allergic rhinitis    • Arthritis    • Atrial fibrillation (CMS/HCC)    • Chronic sinusitis    • Diabetes mellitus (CMS/HCC)    • Dizziness    • Hyperlipidemia     Mixed   • Hypertension    • Hypertrophy of inferior nasal turbinate    • Imbalance    • Thyroid nodule    • Tremor    ,   Past Surgical History:   Procedure Laterality Date   • CARDIAC ABLATION     • TONSILLECTOMY     • TUBAL ABDOMINAL LIGATION     ,   Family History   Problem Relation Age of Onset   • Heart disease Mother    • Heart attack Mother    • Atrial fibrillation Father    ,   Social History     Tobacco Use   • Smoking status: Never Smoker   • Smokeless tobacco: Never Used   Substance Use Topics   • Alcohol use: Yes     Comment: rare   • Drug use: Defer   ,     Medications  Current Outpatient Medications   Medication Sig Dispense Refill   • atorvastatin (LIPITOR) 20 MG tablet Take 20 mg by mouth Every Night.     • estradiol (VAGIFEM) 10 MCG tablet vaginal tablet  "Insert 1 tablet into the vagina 2 (Two) Times a Week. Monday and Friday.     • fluticasone (FLONASE) 50 MCG/ACT nasal spray 1 spray into the nostril(s) as directed by provider Daily As Needed for Allergies.     • lansoprazole (PREVACID) 15 MG capsule Take 15 mg by mouth Daily.     • loratadine (CLARITIN) 10 MG tablet Take 10 mg by mouth Daily.     • magnesium oxide (MAG-OX) 400 MG tablet Take 400 mg by mouth Daily.     • METFORMIN HCL ER PO Take 1,000 mg by mouth 2 (Two) Times a Day.     • metoprolol tartrate (LOPRESSOR) 25 MG tablet Take 1 tablet by mouth Every 12 (Twelve) Hours. 60 tablet 0   • Multiple Vitamins-Minerals (CENTRUM SILVER ULTRA WOMENS) tablet Take 1 tablet by mouth Daily.     • Omega-3 Fatty Acids (FISH OIL) 1000 MG capsule capsule Take 1,000 mg by mouth 2 (Two) Times a Day.     • rivaroxaban (XARELTO) 20 MG tablet Take 1 tablet by mouth Daily With Dinner. 30 tablet 0   • aspirin 81 MG EC tablet Take 81 mg by mouth Daily.       No current facility-administered medications for this visit.        Allergies:  Terramycin [oxytetracycline]    Review of Systems  Review of Systems   Constitution: Positive for weakness and malaise/fatigue.   HENT: Negative.    Eyes: Negative.    Cardiovascular: Positive for dyspnea on exertion and palpitations. Negative for chest pain, claudication, cyanosis, irregular heartbeat, leg swelling, near-syncope, orthopnea, paroxysmal nocturnal dyspnea and syncope.   Respiratory: Negative.    Endocrine: Negative.    Hematologic/Lymphatic: Negative.    Skin: Negative.    Musculoskeletal: Positive for arthritis and back pain.   Gastrointestinal: Negative for anorexia.   Genitourinary: Negative.    Neurological: Positive for light-headedness.   Psychiatric/Behavioral: Negative.        Objective     Physical Exam:  /72   Pulse 72   Ht 162.6 cm (64\")   Wt 78 kg (172 lb)   SpO2 96%   BMI 29.52 kg/m²   Physical Exam   Constitutional: She appears well-developed.   HENT: "   Head: Normocephalic.   Neck: Normal carotid pulses and no JVD present. No tracheal tenderness present. Carotid bruit is not present. No tracheal deviation and no edema present.   Cardiovascular: Regular rhythm, normal heart sounds and normal pulses.   Pulmonary/Chest: Effort normal. No stridor.   Abdominal: Soft.   Neurological: She is alert. She has normal strength. No cranial nerve deficit or sensory deficit.   Skin: Skin is warm.   Psychiatric: She has a normal mood and affect. Her speech is normal and behavior is normal.       Results Review:       ECG 12 Lead  Date/Time: 8/19/2019 9:29 AM  Performed by: Dionicio Cassidy MD  Authorized by: Dionicio Cassidy MD   Comparison: compared with previous ECG from 7/18/2019  Comparison to previous ECG: premature ventricular contractions not seen  Rhythm: sinus rhythm  Rate: normal  Conduction: conduction normal  QRS axis: normal              Assessment/Plan   Patient Active Problem List   Diagnosis   • Paroxysmal atrial fibrillation (CMS/HCC), s/p ablation    • Hypertension   • Chronic fatigue   • Dizzy spells   • Controlled type 2 diabetes mellitus without complication, without long-term current use of insulin (CMS/HCC)   • LVH (left ventricular hypertrophy)   • PVC (premature ventricular contraction)   • Palpitations   • Fatigue   • Visual disturbance     Results for orders placed during the hospital encounter of 07/17/19   Adult Transthoracic Echo Complete W/ Cont if Necessary Per Protocol    Narrative · Estimated EF = 60%.  · Left ventricular wall thickness is consistent with hypertrophy.   Sigmoid-shaped ventricular septum is present.  · Left ventricular diastolic dysfunction.  · Mild pulmonary hypertension is present  · Small patent foramen ovale present with right to left shunting indicated   by color flow Doppler.  · There is no evidence of pericardial effusion.         Plan       Orders Placed This Encounter   Procedures   • Overnight Sleep Oximetry Study      Standing Status:   Future     Standing Expiration Date:   8/19/2020   • Holter Monitor - 72 Hour Up To 21 Days     Standing Status:   Future     Standing Expiration Date:   8/18/2020     Order Specific Question:   Reason for exam?     Answer:   Palpitations   • ECG 12 Lead     This order was created via procedure documentation        ____________________________________________________________________________________________________________________________________________  Health maintenance and recommendations      Similar recommendations as last visit     Offered to give patient  a copy      Questions were encouraged, asked and answered to the patient's  understanding and satisfaction. Questions if any regarding current medications and side effects, need for refills and importance of compliance to medications stressed.    Reviewed available prior notes, consults, prior visits, laboratory findings, radiology and cardiology relevant reports. Updated chart as applicable. I have reviewed the patient's medical history in detail and updated the computerized patient record as relevant.      Updated patient regarding any new or relevant abnormalities on review of records or any new findings on physical exam. Mentioned to patient about purpose of visit and desirable health short and long term goals and objectives.    Primary to monitor CBC CMP Lipid panel and TSH as applicable    ___________________________________________________________________________________________________________________________________________            Return in about 3 months (around 11/19/2019).

## 2019-09-26 ENCOUNTER — HOSPITAL ENCOUNTER (OUTPATIENT)
Dept: ULTRASOUND IMAGING | Facility: HOSPITAL | Age: 69
Discharge: HOME | End: 2019-09-26
Admitting: OTOLARYNGOLOGY

## 2019-09-26 ENCOUNTER — OFFICE VISIT (OUTPATIENT)
Dept: OTOLARYNGOLOGY | Facility: CLINIC | Age: 69
End: 2019-09-26

## 2019-09-26 VITALS
WEIGHT: 178.8 LBS | BODY MASS INDEX: 30.52 KG/M2 | SYSTOLIC BLOOD PRESSURE: 147 MMHG | HEIGHT: 64 IN | HEART RATE: 84 BPM | DIASTOLIC BLOOD PRESSURE: 93 MMHG | TEMPERATURE: 97.1 F

## 2019-09-26 DIAGNOSIS — E04.2 MULTINODULAR GOITER: ICD-10-CM

## 2019-09-26 DIAGNOSIS — E04.1 THYROID NODULE: ICD-10-CM

## 2019-09-26 DIAGNOSIS — E04.2 MULTINODULAR GOITER: Primary | ICD-10-CM

## 2019-09-26 PROCEDURE — 76536 US EXAM OF HEAD AND NECK: CPT

## 2019-09-26 PROCEDURE — 99213 OFFICE O/P EST LOW 20 MIN: CPT | Performed by: PHYSICIAN ASSISTANT

## 2019-09-26 RX ORDER — PERPHENAZINE 16 MG/1
TABLET, FILM COATED ORAL
Refills: 5 | COMMUNITY
Start: 2019-09-03

## 2019-09-26 RX ORDER — GLUCOSAM/CHON-MSM1/C/MANG/BOSW 500-416.6
TABLET ORAL
Refills: 5 | COMMUNITY
Start: 2019-09-11

## 2019-09-26 NOTE — PATIENT INSTRUCTIONS
Will recheck thyroid with ultrasound and TSH in one year. If symptoms develop call for sooner appointment.

## 2019-09-26 NOTE — PROGRESS NOTES
AG[facility]zon     Chief Complaint   Patient presents with   • Follow-up     Thyroid        HISTORY OF PRESENT ILLNESS:     Terra Garcia is a  69 y.o.  female who is here for follow up. She has had continued problems with thyroid nodules. The symptoms are localized to the bilateral thyroid. The symptoms severity was described as: no obvious clinical symptoms. The symptoms have been: relatively constant for the last several years. The symptoms are aggravated by  no identifiable factors. The symptoms are improved by no identifiable factors. She denies  dysphagia, neck tightness, a visable thyroid enlargement or a visable thyroid nodule.            Multiple cystic and solid nodules  Largest right measures 0.9 x 0.5 x 0.6 cm  Largest left measures 0.68 x 0.52 x 0.72 cm    Review of Systems   Constitutional: Negative for activity change, appetite change, chills, diaphoresis, fatigue, fever and unexpected weight change.   HENT: Negative for congestion, dental problem, drooling, ear discharge, ear pain, facial swelling, hearing loss, mouth sores, nosebleeds, postnasal drip, rhinorrhea, sinus pressure, sneezing, sore throat, tinnitus, trouble swallowing and voice change.         Multinodular goiter   Eyes: Negative.    Respiratory: Negative.    Cardiovascular: Negative.    Gastrointestinal: Negative.    Endocrine: Negative.    Skin: Negative.    Allergic/Immunologic: Negative for environmental allergies, food allergies and immunocompromised state.   Neurological: Negative.    Hematological: Negative.    Psychiatric/Behavioral: Negative.    :    Past History:  Past Medical History:   Diagnosis Date   • Allergic rhinitis    • Arthritis    • Atrial fibrillation (CMS/HCC)    • Chronic sinusitis    • Diabetes mellitus (CMS/HCC)    • Dizziness    • Hyperlipidemia     Mixed   • Hypertension    • Hypertrophy of inferior nasal turbinate    • Imbalance    • Palpitations    • Thyroid nodule    • Tremor      Past  Surgical History:   Procedure Laterality Date   • CARDIAC ABLATION     • TONSILLECTOMY     • TUBAL ABDOMINAL LIGATION       Family History   Problem Relation Age of Onset   • Heart disease Mother    • Heart attack Mother    • Atrial fibrillation Father      Social History     Tobacco Use   • Smoking status: Never Smoker   • Smokeless tobacco: Never Used   Substance Use Topics   • Alcohol use: Yes     Comment: rare   • Drug use: Defer     Outpatient Medications Marked as Taking for the 9/26/19 encounter (Office Visit) with Adair Eldridge PA   Medication Sig Dispense Refill   • atorvastatin (LIPITOR) 20 MG tablet Take 20 mg by mouth Every Night.     • CONTOUR NEXT TEST test strip USE AS DIRECTED TO check blood sugar daily  5   • estradiol (VAGIFEM) 10 MCG tablet vaginal tablet Insert 1 tablet into the vagina 2 (Two) Times a Week. Monday and Friday.     • fluticasone (FLONASE) 50 MCG/ACT nasal spray 1 spray into the nostril(s) as directed by provider Daily As Needed for Allergies.     • lansoprazole (PREVACID) 15 MG capsule Take 15 mg by mouth Daily.     • loratadine (CLARITIN) 10 MG tablet Take 10 mg by mouth Daily.     • magnesium oxide (MAG-OX) 400 MG tablet Take 400 mg by mouth Daily.     • Multiple Vitamins-Minerals (CENTRUM SILVER ULTRA WOMENS) tablet Take 1 tablet by mouth Daily.     • Omega-3 Fatty Acids (FISH OIL) 1000 MG capsule capsule Take 1,000 mg by mouth 2 (Two) Times a Day.     • rivaroxaban (XARELTO) 20 MG tablet Take 1 tablet by mouth Daily With Dinner. 30 tablet 0   • TRUEPLUS LANCETS 33G misc USE WITH GLUCOMETER TESTING ONCE DAILY  5     Allergies:  Terramycin [oxytetracycline]          Vital Signs:   Vitals:    09/26/19 1347   BP: 147/93   Pulse: 84   Temp: 97.1 °F (36.2 °C)         EXAMINATION:   CONSTITUTIONAL: well nourished, alert, oriented, in no acute distress     COMMUNICATION AND VOICE: able to communicate normally, normal voice quality    HEAD: normocephalic, no lesions,  atraumatic, no tenderness, no masses     FACE: appearance normal, no lesions, no tenderness, no deformities, facial motion symmetric    SALIVARY GLANDS: parotid glands with no tenderness, no swelling, no masses, submandibular glands with normal size, nontender    EYES: ocular motility normal, eyelids normal, orbits normal, no proptosis, conjunctiva normal , pupils equal, round     EARS:  Hearing: response to conversational voice normal bilaterally   External Ears: auricles without lesions  Otoscopic: tympanic membrane appearance normal, no lesions, no perforation, normal mobility, no fluid    NOSE:  External Nose: structure normal, no tenderness on palpation, no nasal discharge, no lesions, no evidence of trauma, nostrils patent   Intranasal Exam: nasal mucosa normal, vestibule within normal limits, inferior turbinate normal, nasal septum midline     ORAL:  Lips: upper and lower lips without lesion   Teeth: dentition within normal limits for age   Gums: gingivae healthy   Oral Mucosa: oral mucosa normal, no mucosal lesions   Floor of Mouth: Warthin’s duct patent, mucosa normal  Tongue: lingual mucosa normal without lesions, normal tongue mobility   Palate: soft and hard palates with normal mucosa and structure  Oropharynx: oropharyngeal mucosa normal    NECK: neck appearance normal, no mass,  noted without erythema or tenderness    THYROID: no overt thyromegaly, no tenderness, nodules or mass present on palpation, position midline     LYMPH NODES: no lymphadenopathy    CHEST/RESPIRATORY: respiratory effort normal, normal breath sounds     CARDIOVASCULAR: rate and rhythm normal, extremities without cyanosis or edema      NEUROLOGIC/PSYCHIATRIC: oriented to time, place and person, mood normal, affect appropriate, CN II-XII intact grossly    RESULTS REVIEW:    I have reviewed the patients old records in the chart.  I reviewed the patient's new clinical results.       Assessment    Diagnosis Plan   1. Multinodular  goiter  TSH    US Thyroid       Plan    Patient Instructions   Will recheck thyroid with ultrasound and TSH in one year. If symptoms develop call for sooner appointment.       Orders Placed This Encounter   Procedures   • US Thyroid   • TSH          Return in about 1 year (around 9/26/2020) for Recheck thyroid with ultrasound and TSH.    [facility] Pinzon  09/26/19  2:02 PM

## 2019-11-18 ENCOUNTER — OFFICE VISIT (OUTPATIENT)
Dept: CARDIOLOGY | Facility: CLINIC | Age: 69
End: 2019-11-18

## 2019-11-18 VITALS
OXYGEN SATURATION: 99 % | HEIGHT: 64 IN | DIASTOLIC BLOOD PRESSURE: 82 MMHG | BODY MASS INDEX: 31.76 KG/M2 | HEART RATE: 77 BPM | SYSTOLIC BLOOD PRESSURE: 122 MMHG | WEIGHT: 186 LBS

## 2019-11-18 DIAGNOSIS — R00.2 PALPITATIONS: ICD-10-CM

## 2019-11-18 DIAGNOSIS — R53.82 CHRONIC FATIGUE: ICD-10-CM

## 2019-11-18 DIAGNOSIS — E11.9 CONTROLLED TYPE 2 DIABETES MELLITUS WITHOUT COMPLICATION, WITHOUT LONG-TERM CURRENT USE OF INSULIN (HCC): ICD-10-CM

## 2019-11-18 DIAGNOSIS — I49.3 PVC (PREMATURE VENTRICULAR CONTRACTION): Primary | ICD-10-CM

## 2019-11-18 DIAGNOSIS — I51.7 LVH (LEFT VENTRICULAR HYPERTROPHY): ICD-10-CM

## 2019-11-18 DIAGNOSIS — R53.83 OTHER FATIGUE: ICD-10-CM

## 2019-11-18 DIAGNOSIS — R42 DIZZY SPELLS: ICD-10-CM

## 2019-11-18 DIAGNOSIS — I48.0 PAROXYSMAL ATRIAL FIBRILLATION (HCC): ICD-10-CM

## 2019-11-18 DIAGNOSIS — I10 ESSENTIAL HYPERTENSION: ICD-10-CM

## 2019-11-18 DIAGNOSIS — E04.2 MULTINODULAR GOITER: ICD-10-CM

## 2019-11-18 DIAGNOSIS — H53.9 VISUAL DISTURBANCE: ICD-10-CM

## 2019-11-18 PROCEDURE — 93000 ELECTROCARDIOGRAM COMPLETE: CPT | Performed by: INTERNAL MEDICINE

## 2019-11-18 PROCEDURE — 99214 OFFICE O/P EST MOD 30 MIN: CPT | Performed by: INTERNAL MEDICINE

## 2019-11-18 NOTE — PROGRESS NOTES
Terra Garcia  [id number]  1950  69 y.o.  female    Referring Provider: Tanner Smith Jr., MD    Reason for Follow-up Visit:  Routine follow up.  Paroxysmal atrial fibrillation s/p AF ablation    Subjective    Feels better overall    No new events or complaints since last visit   Overall the patient feels no major change from baseline symptoms     Overall feeling well   No chest pain or shortness of breath     No palpitations  No significant pedal edema    Compliant with medications and dietary advice  Good effort tolerance  Walking much more now and getting 7k steps easily    No presyncope or syncope  Compliant with medications      She stopped Metformin and dizziness weakness and fatigue has improved a lot   A1c better    Had home sleep study and awaiting results      Recent TSH normal   Labs OK   A1c 5.6    Recent holter in [place] shows brief SVT       History of present illness:  Terra Garcia is a 69 y.o. yo female with history of Paroxysmal atrial fibrillation  who presents today for   Chief Complaint   Patient presents with   • Atrial Fibrillation     3 MON FU    • Palpitations   .    History  Past Medical History:   Diagnosis Date   • Allergic rhinitis    • Arthritis    • Atrial fibrillation (CMS/HCC)    • Chronic sinusitis    • Diabetes mellitus (CMS/HCC)    • Dizziness    • Hyperlipidemia     Mixed   • Hypertension    • Hypertrophy of inferior nasal turbinate    • Imbalance    • Palpitations    • Thyroid nodule    • Tremor    ,   Past Surgical History:   Procedure Laterality Date   • CARDIAC ABLATION     • TONSILLECTOMY     • TUBAL ABDOMINAL LIGATION     ,   Family History   Problem Relation Age of Onset   • Heart disease Mother    • Heart attack Mother    • Atrial fibrillation Father    ,   Social History     Tobacco Use   • Smoking status: Never Smoker   • Smokeless tobacco: Never Used   Substance Use Topics   • Alcohol use: Yes     Comment: rare   • Drug use: Defer   ,      Medications  Current Outpatient Medications   Medication Sig Dispense Refill   • atorvastatin (LIPITOR) 20 MG tablet Take 20 mg by mouth Every Night.     • estradiol (VAGIFEM) 10 MCG tablet vaginal tablet Insert 1 tablet into the vagina 2 (Two) Times a Week. Monday and Friday.     • fluticasone (FLONASE) 50 MCG/ACT nasal spray 1 spray into the nostril(s) as directed by provider Daily As Needed for Allergies.     • lansoprazole (PREVACID) 15 MG capsule Take 15 mg by mouth Daily.     • loratadine (CLARITIN) 10 MG tablet Take 10 mg by mouth Daily.     • magnesium oxide (MAG-OX) 400 MG tablet Take 400 mg by mouth Daily.     • Multiple Vitamins-Minerals (CENTRUM SILVER ULTRA WOMENS) tablet Take 1 tablet by mouth Daily.     • Omega-3 Fatty Acids (FISH OIL) 1000 MG capsule capsule Take 1,000 mg by mouth 2 (Two) Times a Day.     • rivaroxaban (XARELTO) 20 MG tablet Take 1 tablet by mouth Daily With Dinner. 30 tablet 0   • CONTOUR NEXT TEST test strip USE AS DIRECTED TO check blood sugar daily  5   • metoprolol tartrate (LOPRESSOR) 25 MG tablet Take 1 tablet by mouth 2 (Two) Times a Day. 180 tablet 3   • TRUEPLUS LANCETS 33G misc USE WITH GLUCOMETER TESTING ONCE DAILY  5     No current facility-administered medications for this visit.        Allergies:  Terramycin [oxytetracycline]    Review of Systems  Review of Systems   Constitution: Positive for weakness and malaise/fatigue.   HENT: Negative.    Eyes: Negative.    Cardiovascular: Positive for dyspnea on exertion and palpitations. Negative for chest pain, claudication, cyanosis, irregular heartbeat, leg swelling, near-syncope, orthopnea, paroxysmal nocturnal dyspnea and syncope.   Respiratory: Negative.    Endocrine: Negative.    Hematologic/Lymphatic: Negative.    Skin: Negative.    Musculoskeletal: Positive for arthritis and back pain.   Gastrointestinal: Negative for anorexia.   Genitourinary: Negative.    Neurological: Positive for light-headedness.  "  Psychiatric/Behavioral: Negative.        Objective     Physical Exam:  /82   Pulse 77   Ht 162.6 cm (64.02\")   Wt 84.4 kg (186 lb)   SpO2 99%   BMI 31.91 kg/m²   Physical Exam   Constitutional: She appears well-developed.   HENT:   Head: Normocephalic.   Neck: Normal carotid pulses and no JVD present. No tracheal tenderness present. Carotid bruit is not present. No tracheal deviation and no edema present.   Cardiovascular: Regular rhythm, normal heart sounds and normal pulses.   Pulmonary/Chest: Effort normal. No stridor.   Abdominal: Soft. She exhibits no distension. There is no hepatosplenomegaly. There is no tenderness.   Neurological: She is alert. She has normal strength. No cranial nerve deficit or sensory deficit.   Skin: Skin is warm.   Psychiatric: She has a normal mood and affect. Her speech is normal and behavior is normal.       Results Review:      Terra Garcia   Holter Monitor 72Hr-21Day (0296T/0298T)   Order# [id number]   Reading physician: Dionicio Cassidy MD Ordering physician: Dionicio Cassidy MD Study date: 19   Patient Information     Patient Name  Terra Garcia MRN  [id number] Sex  Female  (Age)  1950 (69 y.o.)   Interpretation Summary     · ~14 day monitor ,entire report was reviewed  · The predominant rhythm noted during the testing period was sinus rhythm.  · Rare supraventricular ectopics with an APC burden of: < 1%        Frequent premature ventricular contractions with a burden of 9%.        2 runs of nonsustained ventricular tachycardia longest 3 beats at a maximum rate of 130 bpm.        Frequent runs of supraventricular tachycardia a total of 101 episodes with the longest being 27 beats at a maximum rate of 186 bpm.  · No correlated arrhythmia  · No significant pauses           Conclusion: Baseline rhythm is sinus.  Frequent premature ventricular contractions with a burden of 9%.  2 runs of nonsustained ventricular tachycardia longest 3 beats at a maximum " rate of 130 bpm.  Frequent runs of supraventricular tachycardia a total of 101 episodes with the longest being 27 beats at a maximum rate of 186 bpm.           Results for orders placed during the hospital encounter of 07/17/19   Adult Transthoracic Echo Complete W/ Cont if Necessary Per Protocol    Narrative · Estimated EF = 60%.  · Left ventricular wall thickness is consistent with hypertrophy.   Sigmoid-shaped ventricular septum is present.  · Left ventricular diastolic dysfunction.  · Mild pulmonary hypertension is present  · Small patent foramen ovale present with right to left shunting indicated   by color flow Doppler.  · There is no evidence of pericardial effusion.              ECG 12 Lead  Date/Time: 11/18/2019 12:23 PM  Performed by: Dionicio Cassidy MD  Authorized by: Dionicio Cassidy MD   Comparison: compared with previous ECG from 8/19/2019  Similar to previous ECG  Rhythm: sinus rhythm  Rate: normal  Conduction: conduction normal  ST Segments: ST segments normal  T Waves: T waves normal  QRS axis: normal  Other: no other findings    Clinical impression: normal ECG            Assessment/Plan   Patient Active Problem List   Diagnosis   • Paroxysmal atrial fibrillation (CMS/HCC), s/p ablation    • Hypertension   • Chronic fatigue   • Dizzy spells   • Controlled type 2 diabetes mellitus without complication, without long-term current use of insulin (CMS/HCC)   • LVH (left ventricular hypertrophy)   • PVC (premature ventricular contraction)   • Palpitations   • Fatigue   • Visual disturbance   • Multinodular goiter        Plan       The current medical regimen is effective;  continue present plan and medications.   Will start beta blocker therapy   Requested Prescriptions     Signed Prescriptions Disp Refills   • metoprolol tartrate (LOPRESSOR) 25 MG tablet 180 tablet 3     Sig: Take 1 tablet by mouth 2 (Two) Times a Day.           ____________________________________________________________________________________________________________________________________________  Health maintenance and recommendations      Similar recommendations as last visit     Offered to give patient  a copy      Questions were encouraged, asked and answered to the patient's  understanding and satisfaction. Questions if any regarding current medications and side effects, need for refills and importance of compliance to medications stressed.    Reviewed available prior notes, consults, prior visits, laboratory findings, radiology and cardiology relevant reports. Updated chart as applicable. I have reviewed the patient's medical history in detail and updated the computerized patient record as relevant.      Updated patient regarding any new or relevant abnormalities on review of records or any new findings on physical exam. Mentioned to patient about purpose of visit and desirable health short and long term goals and objectives.    Primary to monitor CBC CMP Lipid panel and TSH as applicable    ___________________________________________________________________________________________________________________________________________        Per patient request     Return in about 1 year (around 11/18/2020).

## 2020-07-13 DIAGNOSIS — E04.2 MULTINODULAR GOITER: Primary | ICD-10-CM

## 2020-09-21 ENCOUNTER — LAB (OUTPATIENT)
Dept: LAB | Facility: HOSPITAL | Age: 70
End: 2020-09-21

## 2020-09-21 ENCOUNTER — HOSPITAL ENCOUNTER (OUTPATIENT)
Dept: ULTRASOUND IMAGING | Facility: HOSPITAL | Age: 70
Discharge: HOME | End: 2020-09-21

## 2020-09-21 DIAGNOSIS — E04.2 MULTINODULAR GOITER: ICD-10-CM

## 2020-09-21 PROCEDURE — 76536 US EXAM OF HEAD AND NECK: CPT

## 2020-09-21 PROCEDURE — 84443 ASSAY THYROID STIM HORMONE: CPT | Performed by: PHYSICIAN ASSISTANT

## 2020-09-21 PROCEDURE — 36415 COLL VENOUS BLD VENIPUNCTURE: CPT

## 2020-09-22 LAB — TSH SERPL DL<=0.05 MIU/L-ACNC: 1.17 UIU/ML (ref 0.27–4.2)

## 2020-12-09 ENCOUNTER — OFFICE VISIT (OUTPATIENT)
Dept: CARDIOLOGY | Facility: CLINIC | Age: 70
End: 2020-12-09

## 2020-12-09 VITALS
BODY MASS INDEX: 33.99 KG/M2 | WEIGHT: 204 LBS | OXYGEN SATURATION: 97 % | DIASTOLIC BLOOD PRESSURE: 90 MMHG | HEART RATE: 68 BPM | HEIGHT: 65 IN | SYSTOLIC BLOOD PRESSURE: 130 MMHG

## 2020-12-09 DIAGNOSIS — E11.9 CONTROLLED TYPE 2 DIABETES MELLITUS WITHOUT COMPLICATION, WITHOUT LONG-TERM CURRENT USE OF INSULIN (HCC): ICD-10-CM

## 2020-12-09 DIAGNOSIS — R53.82 CHRONIC FATIGUE: ICD-10-CM

## 2020-12-09 DIAGNOSIS — I51.7 LVH (LEFT VENTRICULAR HYPERTROPHY): Primary | ICD-10-CM

## 2020-12-09 DIAGNOSIS — R00.2 PALPITATIONS: ICD-10-CM

## 2020-12-09 DIAGNOSIS — H53.9 VISUAL DISTURBANCE: ICD-10-CM

## 2020-12-09 DIAGNOSIS — I49.3 PVC (PREMATURE VENTRICULAR CONTRACTION): ICD-10-CM

## 2020-12-09 DIAGNOSIS — E04.2 MULTINODULAR GOITER: ICD-10-CM

## 2020-12-09 DIAGNOSIS — R53.83 OTHER FATIGUE: ICD-10-CM

## 2020-12-09 DIAGNOSIS — I48.0 PAROXYSMAL ATRIAL FIBRILLATION (HCC): ICD-10-CM

## 2020-12-09 DIAGNOSIS — R42 DIZZY SPELLS: ICD-10-CM

## 2020-12-09 DIAGNOSIS — I10 ESSENTIAL HYPERTENSION: ICD-10-CM

## 2020-12-09 PROCEDURE — 93000 ELECTROCARDIOGRAM COMPLETE: CPT | Performed by: INTERNAL MEDICINE

## 2020-12-09 PROCEDURE — 99214 OFFICE O/P EST MOD 30 MIN: CPT | Performed by: INTERNAL MEDICINE

## 2020-12-09 RX ORDER — LISINOPRIL 5 MG/1
5 TABLET ORAL DAILY
Qty: 90 TABLET | Refills: 3 | Status: SHIPPED | OUTPATIENT
Start: 2020-12-09 | End: 2021-11-10

## 2020-12-09 RX ORDER — CLONIDINE HYDROCHLORIDE 0.1 MG/1
0.1 TABLET ORAL 2 TIMES DAILY PRN
Qty: 60 TABLET | Refills: 11 | Status: SHIPPED | OUTPATIENT
Start: 2020-12-09

## 2020-12-09 NOTE — PROGRESS NOTES
Terra Garcia  [id number]  1950  70 y.o.  female    Referring Provider: Eliseo Vigil MD    Reason for Follow-up Visit:  Routine follow up.  Paroxysmal atrial fibrillation s/p AF ablation    Subjective      Lost her    BP elevated  Overall the patient feels no major change from baseline symptoms     Overall feeling  OK   No chest pain or shortness of breath     No palpitations  No significant pedal edema    Compliant with medications and dietary advice  Fair effort tolerance    No presyncope or syncope  Compliant with medications      Blood sugars well controlled at home   A1c last 5.7     Had home sleep study and started CPAP  Prior TSH normal   Labs OK   Prior holter in [place] shows brief SVT       History of present illness:  Terra Garcia is a 70 y.o. yo female with history of Paroxysmal atrial fibrillation  who presents today for   Chief Complaint   Patient presents with   • Atrial Fibrillation     1yr- having palipations and heart racing a little daily. some swelling in left foot, patient does monitor her BP and weight at home weekly.    • PVC   .    History  Past Medical History:   Diagnosis Date   • Allergic rhinitis    • Arthritis    • Atrial fibrillation (CMS/HCC)    • Chronic sinusitis    • Diabetes mellitus (CMS/HCC)    • Dizziness    • Hyperlipidemia     Mixed   • Hypertension    • Hypertrophy of inferior nasal turbinate    • Imbalance    • Palpitations    • Thyroid nodule    • Tremor    ,   Past Surgical History:   Procedure Laterality Date   • CARDIAC ABLATION     • TONSILLECTOMY     • TUBAL ABDOMINAL LIGATION     ,   Family History   Problem Relation Age of Onset   • Heart disease Mother    • Heart attack Mother    • Atrial fibrillation Father    ,   Social History     Tobacco Use   • Smoking status: Never Smoker   • Smokeless tobacco: Never Used   Substance Use Topics   • Alcohol use: Yes     Comment: rare   • Drug use: Defer   ,     Medications  Current Outpatient  Medications   Medication Sig Dispense Refill   • atorvastatin (LIPITOR) 20 MG tablet Take 20 mg by mouth Every Night.     • CONTOUR NEXT TEST test strip USE AS DIRECTED TO check blood sugar daily  5   • estradiol (VAGIFEM) 10 MCG tablet vaginal tablet Insert 1 tablet into the vagina 2 (Two) Times a Week. Monday and Friday.     • fluticasone (FLONASE) 50 MCG/ACT nasal spray 1 spray into the nostril(s) as directed by provider Daily As Needed for Allergies.     • lansoprazole (PREVACID) 15 MG capsule Take 15 mg by mouth Daily.     • loratadine (CLARITIN) 10 MG tablet Take 10 mg by mouth Daily.     • magnesium oxide (MAG-OX) 400 MG tablet Take 400 mg by mouth Daily.     • metFORMIN (GLUCOPHAGE) 500 MG tablet      • metoprolol tartrate (LOPRESSOR) 25 MG tablet TAKE ONE TABLET BY MOUTH TWICE DAILY 180 tablet 3   • Multiple Vitamins-Minerals (CENTRUM SILVER ULTRA WOMENS) tablet Take 1 tablet by mouth Daily.     • Omega-3 Fatty Acids (FISH OIL) 1000 MG capsule capsule Take 1,000 mg by mouth 2 (Two) Times a Day.     • rivaroxaban (XARELTO) 20 MG tablet Take 1 tablet by mouth Daily With Dinner. 30 tablet 0   • TRUEPLUS LANCETS 33G misc USE WITH GLUCOMETER TESTING ONCE DAILY  5   • cloNIDine (Catapres) 0.1 MG tablet Take 1 tablet by mouth 2 (Two) Times a Day As Needed for High Blood Pressure (> 150/90 mm Hg). 60 tablet 11   • lisinopril (PRINIVIL,ZESTRIL) 5 MG tablet Take 1 tablet by mouth Daily. 90 tablet 3     No current facility-administered medications for this visit.        Allergies:  Terramycin [oxytetracycline]    Review of Systems  Review of Systems   Constitution: Positive for malaise/fatigue.   HENT: Negative.    Eyes: Negative.    Cardiovascular: Positive for dyspnea on exertion and palpitations. Negative for chest pain, claudication, cyanosis, irregular heartbeat, leg swelling, near-syncope, orthopnea, paroxysmal nocturnal dyspnea and syncope.   Respiratory: Negative.    Endocrine: Negative.   "  Hematologic/Lymphatic: Negative.    Skin: Negative.    Musculoskeletal: Positive for arthritis and back pain.   Gastrointestinal: Negative for anorexia.   Genitourinary: Negative.    Neurological: Positive for light-headedness and weakness.   Psychiatric/Behavioral: Negative.        Objective     Physical Exam:  /90   Pulse 68   Ht 163.8 cm (64.5\")   Wt 92.5 kg (204 lb)   SpO2 97%   BMI 34.48 kg/m²   Physical Exam   Constitutional: She appears well-developed.   HENT:   Head: Normocephalic.   Neck: Normal carotid pulses and no JVD present. No tracheal tenderness present. Carotid bruit is not present. No tracheal deviation and no edema present.   Cardiovascular: Regular rhythm, normal heart sounds and normal pulses.   Pulmonary/Chest: Effort normal. No stridor.   Abdominal: Soft. She exhibits no distension. There is no abdominal tenderness.   Neurological: She is alert. No cranial nerve deficit or sensory deficit.   Skin: Skin is warm.   Psychiatric: Her speech is normal and behavior is normal.       Results Review:      Terra Garcia   Holter Monitor 72Hr-21Day (0296T/0298T)   Order# [id number]   Reading physician: Dionicio Cassidy MD Ordering physician: Dionicio Cassidy MD Study date: 19   Patient Information     Patient Name  Terra Garcia MRN  [id number] Sex  Female  (Age)  1950 (69 y.o.)   Interpretation Summary     · ~14 day monitor ,entire report was reviewed  · The predominant rhythm noted during the testing period was sinus rhythm.  · Rare supraventricular ectopics with an APC burden of: < 1%        Frequent premature ventricular contractions with a burden of 9%.        2 runs of nonsustained ventricular tachycardia longest 3 beats at a maximum rate of 130 bpm.        Frequent runs of supraventricular tachycardia a total of 101 episodes with the longest being 27 beats at a maximum rate of 186 bpm.  · No correlated arrhythmia  · No significant pauses           Conclusion: Baseline " rhythm is sinus.  Frequent premature ventricular contractions with a burden of 9%.  2 runs of nonsustained ventricular tachycardia longest 3 beats at a maximum rate of 130 bpm.  Frequent runs of supraventricular tachycardia a total of 101 episodes with the longest being 27 beats at a maximum rate of 186 bpm.           Results for orders placed during the hospital encounter of 07/17/19   Adult Transthoracic Echo Complete W/ Cont if Necessary Per Protocol    Narrative · Estimated EF = 60%.  · Left ventricular wall thickness is consistent with hypertrophy.   Sigmoid-shaped ventricular septum is present.  · Left ventricular diastolic dysfunction.  · Mild pulmonary hypertension is present  · Small patent foramen ovale present with right to left shunting indicated   by color flow Doppler.  · There is no evidence of pericardial effusion.              ECG 12 Lead    Date/Time: 12/9/2020 1:22 PM  Performed by: Dionicio Cassidy MD  Authorized by: Dionicio Cassidy MD   Comparison: compared with previous ECG from 11/18/2019  Similar to previous ECG  Rhythm: sinus rhythm  Rate: normal  Conduction: conduction normal  ST Segments: ST segments normal  T Waves: T waves normal  QRS axis: normal    Clinical impression: normal ECG            ____________________________________________________________________________________________________________________________________________  Health maintenance and recommendations  Similar recommendations as last visit     Offered to give patient  a copy      Questions were encouraged, asked and answered to the patient's  understanding and satisfaction. Questions if any regarding current medications and side effects, need for refills and importance of compliance to medications stressed.    Reviewed available prior notes, consults, prior visits, laboratory findings, radiology and cardiology relevant reports. Updated chart as applicable. I have reviewed the patient's medical history in detail and updated  the computerized patient record as relevant.      Updated patient regarding any new or relevant abnormalities on review of records or any new findings on physical exam. Mentioned to patient about purpose of visit and desirable health short and long term goals and objectives.    Primary to monitor CBC CMP Lipid panel and TSH as applicable    ___________________________________________________________________________________________________________________________________________          Assessment/Plan   Patient Active Problem List   Diagnosis   • Paroxysmal atrial fibrillation (CMS/HCC), s/p ablation    • Hypertension   • Chronic fatigue   • Dizzy spells   • Controlled type 2 diabetes mellitus without complication, without long-term current use of insulin (CMS/[facility])   • LVH (left ventricular hypertrophy)   • PVC (premature ventricular contraction)   • Palpitations   • Fatigue   • Visual disturbance   • Multinodular goiter        Plan         The current medical regimen is effective;  continue present plan and medications.   Will start beta blocker therapy     Needs better BP control    Requested Prescriptions     Signed Prescriptions Disp Refills   • cloNIDine (Catapres) 0.1 MG tablet 60 tablet 11     Sig: Take 1 tablet by mouth 2 (Two) Times a Day As Needed for High Blood Pressure (> 150/90 mm Hg).   • lisinopril (PRINIVIL,ZESTRIL) 5 MG tablet 90 tablet 3     Sig: Take 1 tablet by mouth Daily.        Overall doing well no new cardiovascular symptoms and therefore no additional cardiac testing is required   If any interim issues arise will call me for further evaluation.          Return in about 6 months (around 6/9/2021).

## 2021-01-13 NOTE — PROGRESS NOTES
YOB: 1950  Location: [facility]  Location Address: [address] [place] [address],  [address], [address] [place] [address]  Location Phone: [phone]    Chief Complaint   Patient presents with   • Follow-up       History of Present Illness  Terra Garcia is a 70 y.o. female.  Terra Garcia is here for follow up of ENT complaints. The patient has had problems with cerumen accumulation, thyroid nodules  The symptoms are localized to the bilateral ears and thyroid. The patient has had moderate ear symptoms and no obvious clinic thyroid symptoms. The thyroid symptoms have been present for the last several years and the ear symptoms have been present off and on for several years. The symptoms are aggravated by no identifiable factors. The symptoms are improved by no identifiable factors.     Study Result    EXAM: US THYROID- - 2020 1:37 PM CDT     HISTORY: thyroid nodules; E04.2-Nontoxic multinodular goiter       COMPARISON: 2019     TECHNIQUE: Real-time ultrasound performed with representative images  saved to PACS.     FINDINGS:  RIGHT lobe. Measures 5.4 x 1.8 x 2.2 cm. Solid nodule described below.  Other cystic and spongiform nodules without suspicious characteristics  are also present.  ISTHMUS. Measures 0.4 cm, within normal limits.  LEFT lobe. Measures 5.5 x 1.7 x 1.9 cm. Multiple nodules present with  largest nodules described in detail below.     NODULE 1 -   Located - right  Size - 1.0 x 0.6 x 0.8 cm, not discretely characterized on the prior  exam  Density - solid or almost completely solid (2 points)  Echogenicity - hypoechoic (2 points)  Shape - wider than tall (0 points)  Margins - smooth (0 points)  Calcifications - none or large comet tail artifacts (0 points)        ACR TI-RADS 2017 total points: 4  ACR TI-RADS 2017 risk category: TR4 (4-6 points)  ACR TI-RADS 2017 recommendation: Follow-up US in 1 year        NODULE 2 -   Located - left  Size - 1.0 x 0.7 x 0.8 cm, previously 0.7 x  0.5 x 0.7 cm and appeared  more spongiform  Density - mixed solid and cystic (1 point)  Echogenicity - hyperechoic or isoechoic (1 point)  Shape - wider than tall (0 points)  Margins - smooth (0 points)  Calcifications - none or large comet tail artifacts (0 points)        ACR TI-RADS 2017 total points: 2  ACR TI-RADS 2017 risk category: TR2 (2 points)  ACR TI-RADS 2017 recommendation: No further follow-up         NODULE 3 -   Located - left  Size - 1.4 x 0.8 x 0.9 cm, which may represent a single nodule or  conglomerate of nodules. Evaluation to priors limited in comparison due  to differences in technique, but this area appears to been present on  the prior.  Density - mixed solid and cystic (1 point)  Echogenicity - hyperechoic or isoechoic (1 point)  Shape - wider than tall (0 points)  Margins - smooth (0 points)  Calcifications - none or large comet tail artifacts (0 points)        ACR TI-RADS 2017 total points: 2  ACR TI-RADS 2017 risk category: TR2 (2 points)  ACR TI-RADS 2017 recommendation: No further follow-up         IMPRESSION:  1. Multinodular thyroid. Given comparison to prior is limited due to  differences in technique, consider follow-up ultrasound in one year.           This report was finalized on 09/21/2020 16:23 by Dr Venita Palomo MD.           Past Medical History:   Diagnosis Date   • Allergic rhinitis    • Arthritis    • Atrial fibrillation (CMS/HCC)    • Chronic sinusitis    • Diabetes mellitus (CMS/HCC)    • Dizziness    • Hyperlipidemia     Mixed   • Hypertension    • Hypertrophy of inferior nasal turbinate    • Imbalance    • Palpitations    • Thyroid nodule    • Tremor        Past Surgical History:   Procedure Laterality Date   • CARDIAC ABLATION     • TONSILLECTOMY     • TUBAL ABDOMINAL LIGATION         Outpatient Medications Marked as Taking for the 1/14/21 encounter (Office Visit) with Cuco Castellanos MD   Medication Sig Dispense Refill   • atorvastatin (LIPITOR) 20 MG tablet  Take 20 mg by mouth Every Night.     • cloNIDine (Catapres) 0.1 MG tablet Take 1 tablet by mouth 2 (Two) Times a Day As Needed for High Blood Pressure (> 150/90 mm Hg). 60 tablet 11   • CONTOUR NEXT TEST test strip USE AS DIRECTED TO check blood sugar daily  5   • estradiol (VAGIFEM) 10 MCG tablet vaginal tablet Insert 1 tablet into the vagina 2 (Two) Times a Week. Monday and Friday.     • fluticasone (FLONASE) 50 MCG/ACT nasal spray 1 spray into the nostril(s) as directed by provider Daily As Needed for Allergies.     • lansoprazole (PREVACID) 15 MG capsule Take 15 mg by mouth Daily.     • lisinopril (PRINIVIL,ZESTRIL) 5 MG tablet Take 1 tablet by mouth Daily. 90 tablet 3   • loratadine (CLARITIN) 10 MG tablet Take 10 mg by mouth Daily.     • magnesium oxide (MAG-OX) 400 MG tablet Take 400 mg by mouth Daily.     • metFORMIN (GLUCOPHAGE) 500 MG tablet      • metoprolol tartrate (LOPRESSOR) 25 MG tablet TAKE ONE TABLET BY MOUTH TWICE DAILY 180 tablet 3   • Multiple Vitamins-Minerals (CENTRUM SILVER ULTRA WOMENS) tablet Take 1 tablet by mouth Daily.     • Omega-3 Fatty Acids (FISH OIL) 1000 MG capsule capsule Take 1,000 mg by mouth 2 (Two) Times a Day.     • rivaroxaban (XARELTO) 20 MG tablet Take 1 tablet by mouth Daily With Dinner. 30 tablet 0   • TRUEPLUS LANCETS 33G misc USE WITH GLUCOMETER TESTING ONCE DAILY  5       Terramycin [oxytetracycline]    Family History   Problem Relation Age of Onset   • Heart disease Mother    • Heart attack Mother    • Atrial fibrillation Father        Social History     Socioeconomic History   • Marital status:      Spouse name: Not on file   • Number of children: Not on file   • Years of education: Not on file   • Highest education level: Not on file   Tobacco Use   • Smoking status: Never Smoker   • Smokeless tobacco: Never Used   Substance and Sexual Activity   • Alcohol use: Yes     Comment: rare   • Drug use: Defer   • Sexual activity: Defer       Review of Systems    Constitutional: Negative for activity change, appetite change, chills, diaphoresis, fatigue, fever and unexpected weight change.   HENT: Negative for congestion, dental problem, drooling, ear discharge, ear pain, facial swelling, hearing loss, mouth sores, nosebleeds, postnasal drip, rhinorrhea, sinus pressure, sneezing, sore throat, tinnitus, trouble swallowing and voice change.         Cerumen impaction  Thyroid nodules   Eyes: Negative.    Respiratory: Negative.    Cardiovascular: Negative.    Gastrointestinal: Negative.    Endocrine: Negative.    Skin: Negative.    Allergic/Immunologic: Negative for environmental allergies, food allergies and immunocompromised state.   Neurological: Negative.    Hematological: Negative.    Psychiatric/Behavioral: Negative.        Vitals:    01/14/21 1428   BP: 115/82   Pulse: 78   Temp: 97.3 °F (36.3 °C)       Body mass index is 35.3 kg/m².    Objective     Physical Exam  Physical Exam  CONSTITUTIONAL: well nourished, alert, oriented, in no acute distress      COMMUNICATION AND VOICE: able to communicate normally, normal voice quality     HEAD: normocephalic, no lesions, atraumatic, no tenderness, no masses      FACE: appearance normal, no lesions, no tenderness, no deformities, facial motion symmetric     SALIVARY GLANDS: parotid glands with no tenderness, no swelling, no masses, submandibular glands with normal size, nontender     EYES: ocular motility normal, eyelids normal, orbits normal, no proptosis, conjunctiva normal , pupils equal, round      EARS:  Hearing: response to conversational voice normal bilaterally   External Ears: auricles without lesions  Otoscopic: cerumen impaction removed for exam, see procedure note; bilateral tympanic membrane appearance normal, no lesions, no perforation, normal mobility, no fluid     NOSE:  External Nose: structure normal, no tenderness on palpation, no nasal discharge, no lesions, no evidence of trauma, nostrils patent    Intranasal Exam: nasal mucosa normal, vestibule within normal limits, inferior turbinate normal, nasal septum midline      ORAL:  Lips: upper and lower lips without lesion   Teeth: dentition within normal limits for age   Gums: gingivae healthy   Oral Mucosa: oral mucosa normal, no mucosal lesions   Floor of Mouth: Warthin’s duct patent, mucosa normal  Tongue: lingual mucosa normal without lesions, normal tongue mobility   Palate: soft and hard palates with normal mucosa and structure  Oropharynx: oropharyngeal mucosa normal     NECK: neck appearance normal, no mass,  noted without erythema or tenderness     THYROID: no overt thyromegaly, no tenderness, nodules or mass present on palpation, but noted on recent ultrasound of the thyroid, position midline      LYMPH NODES: no lymphadenopathy     CHEST/RESPIRATORY: respiratory effort normaL     CARDIOVASCULAR: extremities without cyanosis or edema       NEUROLOGIC/PSYCHIATRIC: oriented to time, place and person, mood normal, affect appropriate, CN II-XII intact grossly    Assessment/Plan   Diagnoses and all orders for this visit:    1. Multinodular goiter (Primary)  -     US Thyroid; Future  -     TSH; Future    2. Bilateral impacted cerumen      * Surgery not found *  Orders Placed This Encounter   Procedures   • US Thyroid     Standing Status:   Future     Standing Expiration Date:   1/15/2022     Order Specific Question:   Reason for Exam:     Answer:   multinodular goiter   • TSH     Standing Status:   Future     Standing Expiration Date:   1/14/2022     Return in about 8 months (around 9/14/2021) for Recheck thyroid with ultrasound and possible ear cleaning.       Patient Instructions   Cerumen removed and will recheck with ultrasound in 8 months and schedule telephone visit after.

## 2021-01-14 ENCOUNTER — OFFICE VISIT (OUTPATIENT)
Dept: OTOLARYNGOLOGY | Facility: CLINIC | Age: 71
End: 2021-01-14

## 2021-01-14 VITALS
HEIGHT: 65 IN | SYSTOLIC BLOOD PRESSURE: 115 MMHG | WEIGHT: 208.9 LBS | BODY MASS INDEX: 34.81 KG/M2 | TEMPERATURE: 97.3 F | HEART RATE: 78 BPM | DIASTOLIC BLOOD PRESSURE: 82 MMHG

## 2021-01-14 DIAGNOSIS — E04.2 MULTINODULAR GOITER: Primary | ICD-10-CM

## 2021-01-14 DIAGNOSIS — H61.23 BILATERAL IMPACTED CERUMEN: ICD-10-CM

## 2021-01-14 PROCEDURE — 69210 REMOVE IMPACTED EAR WAX UNI: CPT | Performed by: PHYSICIAN ASSISTANT

## 2021-01-14 PROCEDURE — 99214 OFFICE O/P EST MOD 30 MIN: CPT | Performed by: PHYSICIAN ASSISTANT

## 2021-01-14 NOTE — PROGRESS NOTES
Procedure Note    Pre-operative Diagnosis: Cerumen impaction/bilateral    Post-operative Diagnosis: same    Anesthesia: none    Procedure:  Binocular ear microscopy with cerumen removal    Procedure Details:    The patient was placed supine on the procedure table. Using a speculum, the ear was examined with a microscope. Cerumen removed with curette from both ears.    Condition:  Stable.  Patient tolerated procedure well.    Complications:  None

## 2021-06-09 ENCOUNTER — OFFICE VISIT (OUTPATIENT)
Dept: CARDIOLOGY | Facility: CLINIC | Age: 71
End: 2021-06-09

## 2021-06-09 VITALS
SYSTOLIC BLOOD PRESSURE: 126 MMHG | WEIGHT: 203 LBS | HEIGHT: 64 IN | BODY MASS INDEX: 34.66 KG/M2 | DIASTOLIC BLOOD PRESSURE: 64 MMHG

## 2021-06-09 DIAGNOSIS — R00.2 PALPITATIONS: Primary | ICD-10-CM

## 2021-06-09 DIAGNOSIS — E11.9 CONTROLLED TYPE 2 DIABETES MELLITUS WITHOUT COMPLICATION, WITHOUT LONG-TERM CURRENT USE OF INSULIN (HCC): ICD-10-CM

## 2021-06-09 DIAGNOSIS — R42 DIZZY SPELLS: ICD-10-CM

## 2021-06-09 DIAGNOSIS — I10 ESSENTIAL HYPERTENSION: ICD-10-CM

## 2021-06-09 DIAGNOSIS — Z99.89 OSA ON CPAP: ICD-10-CM

## 2021-06-09 DIAGNOSIS — I48.0 PAROXYSMAL ATRIAL FIBRILLATION (HCC): ICD-10-CM

## 2021-06-09 DIAGNOSIS — I51.7 LVH (LEFT VENTRICULAR HYPERTROPHY): ICD-10-CM

## 2021-06-09 DIAGNOSIS — G47.33 OSA ON CPAP: ICD-10-CM

## 2021-06-09 PROCEDURE — 93000 ELECTROCARDIOGRAM COMPLETE: CPT | Performed by: INTERNAL MEDICINE

## 2021-06-09 PROCEDURE — 99214 OFFICE O/P EST MOD 30 MIN: CPT | Performed by: INTERNAL MEDICINE

## 2021-06-09 RX ORDER — ZALEPLON 10 MG/1
10 CAPSULE ORAL
COMMUNITY
Start: 2021-05-24 | End: 2022-08-23

## 2021-06-09 NOTE — PROGRESS NOTES
Terra Garcia  [id number]  1950  70 y.o.  female    Referring Provider: Eliseo Vigil MD    Reason for Follow-up Visit:  Routine follow up.  Paroxysmal atrial fibrillation s/p AF ablation      Subjective    Mild chronic exertional shortness of breath on exertion relieved with rest  No significant cough or wheezing    Rare  palpitations    No associated chest pain  Mild pedal edema    No fever or chills  No significant expectoration    No hemoptysis  No presyncope or syncope    Tolerating current medications well with no untoward side effects   Compliant with prescribed medication regimen. Tries to adhere to cardiac diet.     BP well controlled at home.     Reviewed home BP recordings     Blood sugars well controlled at home   A1c less than 6   No new events or complaints since last visit     Overall the patient feels no major change from baseline symptoms         History of present illness:  Terra Garcia is a 70 y.o. yo female with history of Paroxysmal atrial fibrillation  who presents today for   Chief Complaint   Patient presents with   • LVH     6 month follow up    .    History  Past Medical History:   Diagnosis Date   • Allergic rhinitis    • Arthritis    • Atrial fibrillation (CMS/HCC)    • Chronic sinusitis    • Diabetes mellitus (CMS/HCC)    • Dizziness    • Hyperlipidemia     Mixed   • Hypertension    • Hypertrophy of inferior nasal turbinate    • Imbalance    • Palpitations    • Thyroid nodule    • Tremor    ,   Past Surgical History:   Procedure Laterality Date   • CARDIAC ABLATION     • TONSILLECTOMY     • TUBAL ABDOMINAL LIGATION     ,   Family History   Problem Relation Age of Onset   • Heart disease Mother    • Heart attack Mother    • Atrial fibrillation Father    ,   Social History     Tobacco Use   • Smoking status: Never Smoker   • Smokeless tobacco: Never Used   Substance Use Topics   • Alcohol use: Yes     Comment: rare   • Drug use: Defer   ,     Medications  Current  Outpatient Medications   Medication Sig Dispense Refill   • atorvastatin (LIPITOR) 20 MG tablet Take 20 mg by mouth Every Night.     • cloNIDine (Catapres) 0.1 MG tablet Take 1 tablet by mouth 2 (Two) Times a Day As Needed for High Blood Pressure (> 150/90 mm Hg). 60 tablet 11   • CONTOUR NEXT TEST test strip USE AS DIRECTED TO check blood sugar daily  5   • estradiol (VAGIFEM) 10 MCG tablet vaginal tablet Insert 1 tablet into the vagina 2 (Two) Times a Week. Monday and Friday.     • fluticasone (FLONASE) 50 MCG/ACT nasal spray 1 spray into the nostril(s) as directed by provider Daily As Needed for Allergies.     • lansoprazole (PREVACID) 15 MG capsule Take 15 mg by mouth Daily.     • lisinopril (PRINIVIL,ZESTRIL) 5 MG tablet Take 1 tablet by mouth Daily. 90 tablet 3   • loratadine (CLARITIN) 10 MG tablet Take 10 mg by mouth Daily.     • magnesium oxide (MAG-OX) 400 MG tablet Take 400 mg by mouth Daily.     • metFORMIN (GLUCOPHAGE) 850 MG tablet 850 mg.     • metoprolol tartrate (LOPRESSOR) 25 MG tablet TAKE ONE TABLET BY MOUTH TWICE DAILY 180 tablet 3   • Multiple Vitamins-Minerals (CENTRUM SILVER ULTRA WOMENS) tablet Take 1 tablet by mouth Daily.     • Omega-3 Fatty Acids (FISH OIL) 1000 MG capsule capsule Take 1,000 mg by mouth 2 (Two) Times a Day.     • rivaroxaban (XARELTO) 20 MG tablet Take 1 tablet by mouth Daily With Dinner. 30 tablet 0   • TRUEPLUS LANCETS 33G misc USE WITH GLUCOMETER TESTING ONCE DAILY  5   • zaleplon (SONATA) 10 MG capsule Take 10 mg by mouth every night at bedtime.       No current facility-administered medications for this visit.       Allergies:  Terramycin [oxytetracycline]    Review of Systems  Review of Systems   Constitutional: Positive for malaise/fatigue.   HENT: Negative.    Eyes: Negative.    Cardiovascular: Positive for dyspnea on exertion and palpitations. Negative for chest pain, claudication, cyanosis, irregular heartbeat, leg swelling, near-syncope, orthopnea, paroxysmal  "nocturnal dyspnea and syncope.   Respiratory: Negative.    Endocrine: Negative.    Hematologic/Lymphatic: Negative.    Skin: Negative.    Musculoskeletal: Positive for arthritis and back pain.   Gastrointestinal: Negative for anorexia.   Genitourinary: Negative.    Neurological: Positive for light-headedness and weakness.   Psychiatric/Behavioral: Negative.        Objective     Physical Exam:  /64   Ht 162.6 cm (64\")   Wt 92.1 kg (203 lb)   BMI 34.84 kg/m²   Physical Exam   Constitutional: She appears well-developed.   HENT:   Head: Normocephalic.   Neck: Normal carotid pulses and no JVD present. No tracheal tenderness present. Carotid bruit is not present. No tracheal deviation present.   Cardiovascular: Regular rhythm, normal heart sounds and normal pulses.   Pulmonary/Chest: Effort normal. No stridor.   Abdominal: Soft. She exhibits no distension. There is no abdominal tenderness.   Musculoskeletal:      Right lower le+ Pitting Edema present.      Left lower le+ Pitting Edema present.   Neurological: She is alert. No cranial nerve deficit or sensory deficit.   Skin: Skin is warm.   Psychiatric: Her speech is normal and behavior is normal.       Results Review:      Terra Garcia   Holter Monitor 72Hr-21Day (0296T/0298T)   Order# [id number]   Reading physician: Dionicio Cassidy MD Ordering physician: Dionicio Cassidy MD Study date: 19   Patient Information     Patient Name  Terra Garcia MRN  [id number] Sex  Female  (Age)  1950 (69 y.o.)   Interpretation Summary     · ~14 day monitor ,entire report was reviewed  · The predominant rhythm noted during the testing period was sinus rhythm.  · Rare supraventricular ectopics with an APC burden of: < 1%        Frequent premature ventricular contractions with a burden of 9%.        2 runs of nonsustained ventricular tachycardia longest 3 beats at a maximum rate of 130 bpm.        Frequent runs of supraventricular tachycardia a total of 101 " episodes with the longest being 27 beats at a maximum rate of 186 bpm.  · No correlated arrhythmia  · No significant pauses           Conclusion: Baseline rhythm is sinus.  Frequent premature ventricular contractions with a burden of 9%.  2 runs of nonsustained ventricular tachycardia longest 3 beats at a maximum rate of 130 bpm.  Frequent runs of supraventricular tachycardia a total of 101 episodes with the longest being 27 beats at a maximum rate of 186 bpm.           Results for orders placed during the hospital encounter of 07/17/19    Adult Transthoracic Echo Complete W/ Cont if Necessary Per Protocol    Interpretation Summary  · Estimated EF = 60%.  · Left ventricular wall thickness is consistent with hypertrophy. Sigmoid-shaped ventricular septum is present.  · Left ventricular diastolic dysfunction.  · Mild pulmonary hypertension is present  · Small patent foramen ovale present with right to left shunting indicated by color flow Doppler.  · There is no evidence of pericardial effusion.          ECG 12 Lead    Date/Time: 6/9/2021 11:30 AM  Performed by: Dionicio Cassidy MD  Authorized by: Dionicio Cassidy MD   Comparison: compared with previous ECG from 12/19/2020  Comparison to previous ECG: Ventricular rate decreased from   61   to  59  beats per minute    Rhythm: sinus bradycardia  Rate: bradycardic  Conduction: conduction normal  ST Segments: ST segments normal  Other findings: left ventricular hypertrophy    Clinical impression: abnormal EKG            ____________________________________________________________________________________________________________________________________________  Health maintenance and recommendations  Similar recommendations as last visit     Offered to give patient  a copy      Questions were encouraged, asked and answered to the patient's  understanding and satisfaction. Questions if any regarding current medications and side effects, need for refills and importance of  compliance to medications stressed.    Reviewed available prior notes, consults, prior visits, laboratory findings, radiology and cardiology relevant reports. Updated chart as applicable. I have reviewed the patient's medical history in detail and updated the computerized patient record as relevant.      Updated patient regarding any new or relevant abnormalities on review of records or any new findings on physical exam. Mentioned to patient about purpose of visit and desirable health short and long term goals and objectives.    Primary to monitor CBC CMP Lipid panel and TSH as applicable    ___________________________________________________________________________________________________________________________________________          Assessment/Plan   Patient Active Problem List   Diagnosis   • Paroxysmal atrial fibrillation (CMS/HCC), s/p ablation    • Hypertension   • Chronic fatigue   • Dizzy spells   • Controlled type 2 diabetes mellitus without complication, without long-term current use of insulin (CMS/HCC)   • LVH (left ventricular hypertrophy)   • PVC (premature ventricular contraction)   • Palpitations   • Fatigue   • Visual disturbance   • Multinodular goiter   • Bilateral impacted cerumen   • MARGO on CPAP        Plan      Check BP and heart rates twice daily at least 3x / week, week a month  at home and bring a recording for me to review next visit  If BP >130/85 or < 100/60 persistently over 3 reading 30 mins apart call sooner      Keep A1c less than 7 Primary to monitor  Keep LDL below 70 mg/dl. Monitor liver and renal functions.   Monitor CBC, CMP, TSH (as indicated) and Lipid Panel by primary      Monitor for any signs of bleeding including red or dark stools as well as easy bruisabilty. Fall precautions.     Patient expressed understanding  Encouraged and answered all questions   Discussed with the patient and all questioned fully answered. She will call me if any problems arise.   Discussed  results of prior testing with patient : echo   as well electrocardiogram from today           Orders Placed This Encounter   Procedures   • ECG 12 Lead     This order was created via procedure documentation     Order Specific Question:   Release to patient     Answer:   Immediate   • Adult Transthoracic Echo Complete w/ Color, Spectral and Contrast if necessary per protocol     Myocardial strain to be performed during echocardiogram as long as technically feasible     [facility] per patient request     Standing Status:   Future     Standing Expiration Date:   6/9/2022     Order Specific Question:   Reason for exam?     Answer:   Dyspnea     Order Specific Question:   Release to patient     Answer:   Immediate        I support the patient's decision to take the Covid -19 vaccine   Had both dose   No major issues   Now fully immunized      Patient was advised to continue CPAP daily.           Return in about 6 months (around 12/9/2021).

## 2021-08-23 DIAGNOSIS — E04.2 MULTINODULAR GOITER: Primary | ICD-10-CM

## 2021-09-07 ENCOUNTER — LAB (OUTPATIENT)
Dept: LAB | Facility: HOSPITAL | Age: 71
End: 2021-09-07

## 2021-09-07 ENCOUNTER — HOSPITAL ENCOUNTER (OUTPATIENT)
Dept: ULTRASOUND IMAGING | Facility: HOSPITAL | Age: 71
Discharge: HOME | End: 2021-09-07

## 2021-09-07 DIAGNOSIS — E04.2 MULTINODULAR GOITER: ICD-10-CM

## 2021-09-07 LAB — TSH SERPL DL<=0.05 MIU/L-ACNC: 1.59 UIU/ML (ref 0.27–4.2)

## 2021-09-07 PROCEDURE — 76536 US EXAM OF HEAD AND NECK: CPT

## 2021-09-07 PROCEDURE — 36415 COLL VENOUS BLD VENIPUNCTURE: CPT

## 2021-09-07 PROCEDURE — 84443 ASSAY THYROID STIM HORMONE: CPT

## 2021-10-15 ENCOUNTER — OFFICE VISIT (OUTPATIENT)
Dept: OTOLARYNGOLOGY | Facility: CLINIC | Age: 71
End: 2021-10-15

## 2021-10-15 VITALS
HEART RATE: 70 BPM | BODY MASS INDEX: 34.83 KG/M2 | WEIGHT: 204 LBS | DIASTOLIC BLOOD PRESSURE: 76 MMHG | SYSTOLIC BLOOD PRESSURE: 114 MMHG | HEIGHT: 64 IN | TEMPERATURE: 97.5 F

## 2021-10-15 DIAGNOSIS — E04.2 MULTINODULAR GOITER: Primary | ICD-10-CM

## 2021-10-15 DIAGNOSIS — H61.23 IMPACTED CERUMEN OF BOTH EARS: ICD-10-CM

## 2021-10-15 PROCEDURE — 99213 OFFICE O/P EST LOW 20 MIN: CPT | Performed by: EMERGENCY MEDICINE

## 2021-10-15 PROCEDURE — 69210 REMOVE IMPACTED EAR WAX UNI: CPT | Performed by: EMERGENCY MEDICINE

## 2021-10-15 RX ORDER — LANOLIN ALCOHOL/MO/W.PET/CERES
1000 CREAM (GRAM) TOPICAL DAILY
COMMUNITY

## 2021-10-15 NOTE — PATIENT INSTRUCTIONS
CONTACT INFORMATION:  The main office phone number is [phone]. For emergencies after hours and on weekends, this number will convert over to our answering service and the on call provider will answer. Please try to keep non emergent phone calls/ questions to office hours 9am-5pm Monday through Friday.     [employer]  As an alternative, you can sign up and use the Epic MyChart system for more direct and quicker access for non emergent questions/ problems.  [facility] [employer] allows you to send messages to your doctor, view your test results, renew your prescriptions, schedule appointments, and more. To sign up, go to [employer] and click on the Sign Up Now link in the New User? box. Enter your [employer] Activation Code exactly as it appears below along with the last four digits of your Social Security Number and your Date of Birth () to complete the sign-up process. If you do not sign up before the expiration date, you must request a new code.    [employer] Activation Code: Activation code not generated  Current [employer] Status: Active    If you have questions, you can email [email or web address] or call [phone] to talk to our [employer] staff. Remember, [employer] is NOT to be used for urgent needs. For medical emergencies, dial 911.

## 2021-10-15 NOTE — PROGRESS NOTES
PORTER Almeida     Chief Complaint   Patient presents with   • Thyroid Problem          HPI  Terra Garcia is a  71 y.o. female who is here for follow up. She is followed by Dr. Cuco Castellanos for thyroid nodules.  She also reports cerumen accumulation.         Vital Signs:   Temp:  [97.5 °F (36.4 °C)] 97.5 °F (36.4 °C)  Heart Rate:  [70] 70  BP: (114)/(76) 114/76    ENT Physical Exam  Constitutional  Appearance: patient appears well-developed, well-nourished and well-groomed, patient is cooperative;  Communication/Voice: communication appropriate for developmental age;  Ear  Hearing: intact;  Auricles: right auricle normal; left auricle normal;  External Mastoids: right external mastoid normal; left external mastoid normal;  Ear Canals: bilateral ear canals impacted cerumen observed;  Tympanic Membranes: right tympanic membrane normal; left tympanic membrane normal;  Neck  Neck: neck normal;  Thyroid: nodules present on left lobe;      Result Review    RESULTS REVIEW:    The following results/records were reviewed:   US Thyroid (09/07/2021 10:50) stable nodules      Ear Cerumen Removal    Date/Time: 10/15/2021 10:32 AM  Performed by: Chiquis Pichardo APRN  Authorized by: Chiquis Pichardo APRN   Consent: Verbal consent obtained.  Consent given by: patient  Patient identity confirmed: verbally with patient    Anesthesia:  Local Anesthetic: none  Location details: left ear and right ear  Patient tolerance: patient tolerated the procedure well with no immediate complications  Procedure type: instrumentation   Sedation:  Patient sedated: no           Assessment/Plan     Diagnoses and all orders for this visit:    1. Multinodular goiter (Primary)  -     US Thyroid; Future    2. Impacted cerumen of both ears  -     Ear Cerumen Removal            Continue to follow the thyroid with routine ultrasounds.     Return in about 1 year (around 10/15/2022) for Follow up with PORTER Rangel.         Chiquis WALTON  PORTER Pichardo  10/15/21  10:35 CDT

## 2021-11-10 RX ORDER — LISINOPRIL 10 MG/1
10 TABLET ORAL DAILY
Qty: 90 TABLET | Refills: 4 | Status: SHIPPED | OUTPATIENT
Start: 2021-11-10

## 2022-08-23 ENCOUNTER — OFFICE VISIT (OUTPATIENT)
Dept: CARDIOLOGY | Facility: CLINIC | Age: 72
End: 2022-08-23

## 2022-08-23 VITALS
HEIGHT: 64 IN | SYSTOLIC BLOOD PRESSURE: 120 MMHG | BODY MASS INDEX: 34.66 KG/M2 | WEIGHT: 203 LBS | DIASTOLIC BLOOD PRESSURE: 78 MMHG | RESPIRATION RATE: 18 BRPM | OXYGEN SATURATION: 99 % | HEART RATE: 63 BPM

## 2022-08-23 DIAGNOSIS — I48.0 PAROXYSMAL ATRIAL FIBRILLATION: Primary | ICD-10-CM

## 2022-08-23 DIAGNOSIS — I47.1 PAROXYSMAL SVT (SUPRAVENTRICULAR TACHYCARDIA): ICD-10-CM

## 2022-08-23 PROBLEM — E66.811 OBESITY (BMI 30.0-34.9): Status: ACTIVE | Noted: 2022-08-23

## 2022-08-23 PROBLEM — E66.9 OBESITY (BMI 30.0-34.9): Status: ACTIVE | Noted: 2022-08-23

## 2022-08-23 PROBLEM — I47.10 PAROXYSMAL SVT (SUPRAVENTRICULAR TACHYCARDIA): Status: ACTIVE | Noted: 2022-08-23

## 2022-08-23 PROCEDURE — 99204 OFFICE O/P NEW MOD 45 MIN: CPT | Performed by: STUDENT IN AN ORGANIZED HEALTH CARE EDUCATION/TRAINING PROGRAM

## 2022-08-23 PROCEDURE — 93000 ELECTROCARDIOGRAM COMPLETE: CPT | Performed by: STUDENT IN AN ORGANIZED HEALTH CARE EDUCATION/TRAINING PROGRAM

## 2022-08-23 NOTE — PATIENT INSTRUCTIONS
Work on weight loss, exercise, and  low salt diet  Try the apps [employer] or [employer]  No medication changes at this time  Call me if you develop worsening rhythm problems  I will see you back in clinic in 6 months

## 2022-08-23 NOTE — PROGRESS NOTES
"Chief Complaint  Atrial Fibrillation (NP - Echo 7/2022, Holter 7/2022/Some discomfort in chest when she is in Afib, and some fatigue )    Subjective        History of Present Illness    EP History:  1. Paroxysmal atrial fibrillation  - 4/4/2018:  PVI at [facility] - uncertain modality   2.  SVT  - 7/5/22 Holter monitor with 94 runs of SVT, longest lasting 13 minutes in duration w/ average HR of 123 bpm    Cardiology history:  1.  HTN    Medical History:  1.  Multinodular goiter  2.  DM2  3.  MARGO    Terra Garcia is a 72 y.o. female with past medical history as above who presents to the clinic for evaluation of SVT and atrial fibrillation.  She has a past medical history of pulmonary vein isolation at [facility] in [place] in 2018.  She is uncertain of which modality was used for pulmonary vein isolation, however she believes that it was likely radiofrequency ablation.  She continues to have intermittent palpitations.  These are not daily and are relatively brief in nature.  She states that she feels like her chest occasionally flutters and generally goes away.  She wore a Holter monitor for this purpose which revealed episodes of intermittent SVT, the longest lasting approximately 13 minutes in duration with an average heart rate of 123 bpm.  She does not think that she felt this episode when it occurred.  She remains treated with metoprolol for these episodes.  She is concerned given that her last echocardiogram revealed evidence of pulmonary hypertension.    Objective   Vital Signs:  /78 (BP Location: Right arm, Patient Position: Sitting)   Pulse 63   Resp 18   Ht 162.6 cm (64\")   Wt 92.1 kg (203 lb)   SpO2 99%   BMI 34.84 kg/m²   Estimated body mass index is 34.84 kg/m² as calculated from the following:    Height as of this encounter: 162.6 cm (64\").    Weight as of this encounter: 92.1 kg (203 lb).      Physical Exam  Vitals reviewed.   Constitutional:       Appearance: Normal " appearance. She is obese.   Cardiovascular:      Rate and Rhythm: Normal rate and regular rhythm.      Pulses: Normal pulses.      Heart sounds: Normal heart sounds. No murmur heard.  Pulmonary:      Effort: Pulmonary effort is normal. No respiratory distress.      Breath sounds: Normal breath sounds.   Musculoskeletal:      Right lower le+ Edema present.      Left lower le+ Edema present.   Skin:     General: Skin is warm and dry.   Neurological:      General: No focal deficit present.      Mental Status: She is alert and oriented to person, place, and time.   Psychiatric:         Mood and Affect: Mood normal.         Judgment: Judgment normal.        Result Review :  The following data was reviewed by: Ann Alejandre MD on 2022:    TSH    TSH 21   TSH 1.590           Prior ECG previously performed on 22 was directly visualized and independently interpreted with the following findings:  Sinus rhythm, RBBB      ECG 12 Lead    Date/Time: 2022 1:49 PM  Performed by: Ann Alejandre MD  Authorized by: Ann Alejandre MD   Comparison: compared with previous ECG from 2022  Similar to previous ECG  Rhythm: sinus rhythm  Conduction: right bundle branch block    Clinical impression: abnormal EKG             [id number] Score: 4         Assessment and Plan   Diagnoses and all orders for this visit:    1. Paroxysmal atrial fibrillation (CMS/HCC), s/p ablation  (Primary)  -     Cancel: CBC (No Diff); Future  -     Cancel: Comprehensive Metabolic Panel; Future    2. Paroxysmal SVT (supraventricular tachycardia) (HCC)    Other orders  -     ECG 12 Lead        Terra Garcia is a 72 y.o. female with past medical history as above who presents to the clinic for evaluation of paroxysmal atrial fibrillation and SVT.  Her SVT looks to be consistent with atrial tachycardia.  At this time, given that she is relatively asymptomatic with these events, I discussed the risks and benefits of continued  monitoring versus medication management versus an ablative approach for the treatment of this condition.  For now, given the minimal symptoms, she would prefer to take conservative route and just continue to monitor.  This is quite reasonable.  I have advised her that should she have more symptoms or that the episodes become more sustained, she should call me and we can further discuss consideration of an antiarrhythmic drug versus ablation at that time.  I have advised her about lifestyle changes given her evidence of diastolic heart failure on her echocardiogram and exam.  We furthermore discussed the benefit of weight loss reduction and exercise in the treatment of atrial arrhythmias.    Given her pulmonary hypertension and history of pulmonary vein isolation, we also discussed the possibility of pulmonary vein stenosis contributing to her pulmonary hypertension.  At this time, I believe that this is unlikely, particularly given findings suggestive of underlying diastolic heart failure.  Ultimately, the only real test for accurate assessment of this would be a CT scan or transesophageal echocardiogram.  I discussed the risks and benefits of further testing with the patient, and at this time, she wishes to hold off and continue to monitor.  This is also reasonable.    Plan:  -No medication changes at this time  -Lifestyle changes including increased exercise and weight loss  -Call me with prolonged episodes or worsening symptoms with atrial arrhythmias  - Will discuss the treatment plan with Terra Barrios       Follow Up   Return in about 6 months (around 2/23/2023).  Patient was given instructions and counseling regarding her condition or for health maintenance advice. Please see specific information pulled into the AVS if appropriate.     EMR Dragon/Transcription disclaimer: Much of this encounter note is an electronic transcription/translation of spoken language to printed text. The electronic translation of  spoken language may permit erroneous, or at times, nonsensical words or phrases to be inadvertently transcribed; although I have reviewed the note for such errors, some may still exist.

## 2022-10-12 ENCOUNTER — OFFICE VISIT (OUTPATIENT)
Dept: OTOLARYNGOLOGY | Facility: CLINIC | Age: 72
End: 2022-10-12

## 2022-10-12 VITALS
BODY MASS INDEX: 34.49 KG/M2 | HEART RATE: 61 BPM | SYSTOLIC BLOOD PRESSURE: 123 MMHG | WEIGHT: 202 LBS | DIASTOLIC BLOOD PRESSURE: 81 MMHG | TEMPERATURE: 97.7 F | HEIGHT: 64 IN

## 2022-10-12 DIAGNOSIS — E04.2 MULTINODULAR GOITER: Primary | ICD-10-CM

## 2022-10-12 DIAGNOSIS — H61.23 IMPACTED CERUMEN OF BOTH EARS: ICD-10-CM

## 2022-10-12 PROCEDURE — 69210 REMOVE IMPACTED EAR WAX UNI: CPT | Performed by: NURSE PRACTITIONER

## 2022-10-12 PROCEDURE — 99213 OFFICE O/P EST LOW 20 MIN: CPT | Performed by: NURSE PRACTITIONER

## 2022-10-12 NOTE — PROGRESS NOTES
YOB: 1950  Location: [facility]  Location Address: [address] [place] [address], [facility], [address] [place] [address]  Location Phone: [phone]    Chief Complaint   Patient presents with   • Thyroid Problem       History of Present Illness  Terra Garcia is a 72 y.o. female.  Terra Garcia is here for follow up of ENT complaints. The patient has had problems with thyroid nodules and cerumen impaction     The symptoms are localized to the bilateral thyroid and ears . The patient has had no obvious clinical symptoms. Thyroid nodules have been present for the past several years and have never undergone fine needle aspiration   She states she has had problems with cerumen impaction since her early teens     Patient denies dysphagia, sore throat, hoarseness, or globus sensation      Past Medical History:   Diagnosis Date   • Allergic rhinitis    • Arthritis    • Atrial fibrillation (HCC)    • Chronic sinusitis    • Diabetes mellitus (HCC)    • Dizziness    • Hyperlipidemia     Mixed   • Hypertension    • Hypertrophy of inferior nasal turbinate    • Imbalance    • Palpitations    • Thyroid nodule    • Tremor        Past Surgical History:   Procedure Laterality Date   • CARDIAC ABLATION     • FINGER SURGERY     • TONSILLECTOMY     • TUBAL ABDOMINAL LIGATION         Outpatient Medications Marked as Taking for the 10/12/22 encounter (Office Visit) with Mary Ibarra APRN   Medication Sig Dispense Refill   • atorvastatin (LIPITOR) 20 MG tablet Take 20 mg by mouth Every Night.     • cloNIDine (Catapres) 0.1 MG tablet Take 1 tablet by mouth 2 (Two) Times a Day As Needed for High Blood Pressure (> 150/90 mm Hg). 60 tablet 11   • CONTOUR NEXT TEST test strip USE AS DIRECTED TO check blood sugar daily  5   • estradiol (VAGIFEM) 10 MCG tablet vaginal tablet Insert 1 tablet into the vagina 2 (Two) Times a Week. Monday and Friday.     • fluticasone (FLONASE) 50 MCG/ACT nasal spray 1 spray into the nostril(s) as  directed by provider Daily As Needed for Allergies.     • lansoprazole (PREVACID) 15 MG capsule Take 15 mg by mouth Daily.     • lisinopril (PRINIVIL,ZESTRIL) 10 MG tablet Take 1 tablet by mouth Daily. 90 tablet 4   • loratadine (CLARITIN) 10 MG tablet Take 10 mg by mouth Daily.     • magnesium oxide (MAG-OX) 400 MG tablet Take 400 mg by mouth Daily.     • metFORMIN (GLUCOPHAGE) 850 MG tablet 850 mg.     • metoprolol tartrate (LOPRESSOR) 25 MG tablet TAKE ONE TABLET BY MOUTH TWICE DAILY 180 tablet 3   • Multiple Vitamins-Minerals (CENTRUM SILVER ULTRA WOMENS) tablet Take 1 tablet by mouth Daily.     • Omega-3 Fatty Acids (FISH OIL) 1000 MG capsule capsule Take 1,000 mg by mouth 2 (Two) Times a Day.     • rivaroxaban (XARELTO) 20 MG tablet Take 1 tablet by mouth Daily With Dinner. 30 tablet 0   • TRUEPLUS LANCETS 33G misc USE WITH GLUCOMETER TESTING ONCE DAILY  5   • vitamin B-12 (CYANOCOBALAMIN) 1000 MCG tablet Take 1,000 mcg by mouth Daily.     • vitamin D3 125 MCG (5000 UT) capsule capsule Take 1 capsule by mouth Daily.         Terramycin [oxytetracycline]    Family History   Problem Relation Age of Onset   • Heart disease Mother    • Heart attack Mother    • Atrial fibrillation Father        Social History     Socioeconomic History   • Marital status:    Tobacco Use   • Smoking status: Never   • Smokeless tobacco: Never   Vaping Use   • Vaping Use: Never used   Substance and Sexual Activity   • Alcohol use: Yes     Comment: rare   • Drug use: Defer   • Sexual activity: Defer       Review of Systems   Constitutional: Negative.    HENT: Positive for tinnitus. Negative for ear discharge, ear pain, sore throat, trouble swallowing and voice change.        Vitals:    10/12/22 1306   BP: 123/81   Pulse: 61   Temp: 97.7 °F (36.5 °C)       Body mass index is 34.67 kg/m².    Objective       Physical Exam  Vitals reviewed.   Constitutional:       Appearance: She is normal weight.   HENT:      Head: Normocephalic.       Right Ear: External ear normal. There is impacted cerumen.      Left Ear: External ear normal. There is impacted cerumen.      Nose: Nose normal.      Mouth/Throat:      Lips: Pink.      Mouth: Mucous membranes are moist.      Pharynx: Uvula midline.   Neck:      Comments: Palpable bilateral thyroid nodules   No overt thyromegaly     Musculoskeletal:      Cervical back: Full passive range of motion without pain and normal range of motion.   Neurological:      Mental Status: She is alert.       Ear Cerumen Removal    Date/Time: 10/12/2022 1:57 PM  Performed by: Mary Ibarra APRN  Authorized by: Mary Ibarra APRN   Consent: Verbal consent obtained.  Consent given by: patient  Patient understanding: patient states understanding of the procedure being performed  Patient identity confirmed: verbally with patient    Anesthesia:  Local Anesthetic: none  Location details: left ear and right ear  Patient tolerance: patient tolerated the procedure well with no immediate complications  Comments: Bilateral tm intact     Procedure type: instrumentation, curette   Sedation:  Patient sedated: no          Assessment & Plan   Diagnoses and all orders for this visit:    1. Multinodular goiter (Primary)  -     US Thyroid; Future  -     US Guided Thyroid Biopsy; Future    2. Impacted cerumen of both ears  -     Cerumen Removal    Other orders  -     Cancel: $ Binocular Microscopy  -     Fine Needle Aspiration; Standing      * Surgery not found *  Orders Placed This Encounter   Procedures   • Cerumen Removal     This order was created via procedure documentation     Order Specific Question:   Release to patient     Answer:   Routine Release   • US Thyroid     Standing Status:   Future     Standing Expiration Date:   10/12/2023     Order Specific Question:   Reason for Exam:     Answer:   Thyroid disease   • US Guided Thyroid Biopsy     Standing Status:   Future     Standing Expiration Date:   10/14/2023     Order Specific  Question:   Reason for Exam:     Answer:   left thyroid nodule     Return in about 6 months (around 4/12/2023).     Fine needle aspiration risks vs benefits discussed with patient     Patient Instructions   The patient has a thyroid nodule. I explained the pathology of thyroid nodules including the risks of cancer. The options of surgery versus an observational course were discussed. Recommendation was made for a FINE NEEDLE ASPIRATION of the nodule/mass

## 2022-10-14 ENCOUNTER — TELEPHONE (OUTPATIENT)
Dept: OTOLARYNGOLOGY | Facility: CLINIC | Age: 72
End: 2022-10-14

## 2022-10-14 NOTE — TELEPHONE ENCOUNTER
Patient notified and is on Xeralto.  I advised that I would seek clearance for her to come off of this for the FNA    ultrasound recommends FNA I have ordered can you call her and let her know. we discussed this was likely in office

## 2022-10-17 NOTE — TELEPHONE ENCOUNTER
Dr. Alejandre saw her in August of his year. I will cc his MA on this - he may give her clearance to hold Xarelto. If not, pt will need to make an appointment with Dr Cassidy. She has not seen him since June of last year.

## 2022-11-01 ENCOUNTER — OFFICE VISIT (OUTPATIENT)
Dept: PULMONOLOGY | Facility: CLINIC | Age: 72
End: 2022-11-01

## 2022-11-01 VITALS
BODY MASS INDEX: 34.49 KG/M2 | HEART RATE: 62 BPM | WEIGHT: 202 LBS | HEIGHT: 64 IN | SYSTOLIC BLOOD PRESSURE: 146 MMHG | DIASTOLIC BLOOD PRESSURE: 84 MMHG | OXYGEN SATURATION: 98 %

## 2022-11-01 DIAGNOSIS — Z99.89 OSA ON CPAP: ICD-10-CM

## 2022-11-01 DIAGNOSIS — G47.33 OSA ON CPAP: ICD-10-CM

## 2022-11-01 DIAGNOSIS — I48.0 PAROXYSMAL ATRIAL FIBRILLATION: ICD-10-CM

## 2022-11-01 DIAGNOSIS — I27.20 PULMONARY HYPERTENSION: Primary | ICD-10-CM

## 2022-11-01 PROCEDURE — 99204 OFFICE O/P NEW MOD 45 MIN: CPT | Performed by: INTERNAL MEDICINE

## 2022-11-01 NOTE — PROGRESS NOTES
Background:  pt w pulm htn, hx ethan, paroxysmal afib. LVH   Chief Complaint  pulmonary hypertension    Subjective    History of Present Illness       Terra Garcia presents to [facility].  History of Present Illness  Dr. Vigil has asked me to evaluate her for pulmonary hypertension.  Records from Dr. Vigil are reviewed showing a facial injury and concern over PH with echo from last year showing findings below.  Records from [facility] are reviewed showing treatment for arrhythmia with ablation and additional older echocardiograms, findings below.   She has some dyspnea on exertion, wondering if she has deconditioning.  No history of liver disease, hiv, connective tissue dz or phen phen use.  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       has a past medical history of Allergic rhinitis, Arthritis, Atrial fibrillation (HCC), Chronic sinusitis, Diabetes mellitus (HCC), Dizziness, GERD (gastroesophageal reflux disease), Hyperlipidemia, Hypertension, Hypertrophy of inferior nasal turbinate, Imbalance, Palpitations, Sleep apnea, obstructive, Thyroid nodule, and Tremor.   has a past surgical history that includes Tubal ligation; Tonsillectomy; Cardiac Ablation; and Finger surgery.  family history includes Atrial fibrillation in her father; Cancer in her father; Heart attack in her mother; Heart disease in her mother; Hypertension in her father and mother.   reports that she has never smoked. She has never used smokeless tobacco. She reports that she does not currently use alcohol. She reports that she does not use drugs.  Allergies   Allergen Reactions   • Terramycin [Oxytetracycline]      Current Outpatient Medications   Medication Instructions   • atorvastatin (LIPITOR) 20 mg, Oral, Nightly   • cloNIDine (CATAPRES) 0.1 mg, Oral, 2 Times Daily PRN   • CONTOUR NEXT  "TEST test strip USE AS DIRECTED TO check blood sugar daily   • estradiol (VAGIFEM) 10 MCG tablet vaginal tablet 1 tablet, Vaginal, 2 Times Weekly, Monday and Friday.   • fish oil 1,000 mg, Oral, 2 Times Daily   • fluticasone (FLONASE) 50 MCG/ACT nasal spray 1 spray, Nasal, Daily PRN   • lansoprazole (PREVACID) 15 mg, Oral, Daily   • lisinopril (PRINIVIL,ZESTRIL) 10 mg, Oral, Daily   • loratadine (CLARITIN) 10 mg, Oral, Daily   • magnesium oxide (MAG-OX) 400 mg, Oral, Daily   • metFORMIN (GLUCOPHAGE) 850 mg   • metoprolol tartrate (LOPRESSOR) 25 MG tablet TAKE ONE TABLET BY MOUTH TWICE DAILY   • Multiple Vitamins-Minerals (CENTRUM SILVER ULTRA WOMENS) tablet 1 tablet, Oral, Daily   • rivaroxaban (XARELTO) 20 mg, Oral, Daily With Dinner   • TRUEPLUS LANCETS 33G misc USE WITH GLUCOMETER TESTING ONCE DAILY   • vitamin B-12 (CYANOCOBALAMIN) 1,000 mcg, Oral, Daily   • vitamin D3 5,000 Units, Oral, Daily      Objective     Vital Signs:   /84   Pulse 62   Ht 162.6 cm (64\")   Wt 91.6 kg (202 lb)   SpO2 98% Comment: RA  BMI 34.67 kg/m²   Physical Exam  Constitutional:       General: She is not in acute distress.     Appearance: She is well-developed. She is not ill-appearing or toxic-appearing.   HENT:      Head: Atraumatic.   Eyes:      General: No scleral icterus.     Conjunctiva/sclera: Conjunctivae normal.   Cardiovascular:      Rate and Rhythm: Normal rate and regular rhythm.      Heart sounds: S1 normal and S2 normal.   Pulmonary:      Effort: Pulmonary effort is normal.      Breath sounds: Normal breath sounds.   Abdominal:      General: There is no distension.   Musculoskeletal:         General: No deformity.      Cervical back: Neck supple.   Skin:     Coloration: Skin is not pale.      Findings: No rash.   Neurological:      Mental Status: She is alert.        Result Review  Data Reviewed:{ Labs  Result Review  Imaging  Med Tab  Media :23}     US Thyroid    Result Date: 10/12/2022  Impression: 1. " Multiple nodules within both lobes essentially unchanged in size or appearance from the previous exam. The nodule involving the lower pole of the left lobe is borderline enlarged for fine-needle aspiration per ACR criteria. Continued follow-up surveillance imaging is suggested.    This report was finalized on 10/12/2022 15:14 by Dr. Chano Ayon MD.     echo [facility] 2021      Adult Transthoracic Echo Complete W/ Cont if Necessary Per Protocol (07/18/2019 10:32)  • Estimated EF = 60%.  • Left ventricular wall thickness is consistent with hypertrophy. Sigmoid-shaped ventricular septum is present.  • Left ventricular diastolic dysfunction.  • Mild pulmonary hypertension is present  • Small patent foramen ovale present with right to left shunting indicated by color flow Doppler.  • There is no evidence of pericardial effusion.      Adult Transthoracic Echo Complete (01/20/2017 13:25)  • All left ventricular wall segments contract normally.  • Left ventricular wall thickness is consistent with mild concentric hypertrophy.  • Mild mitral valve regurgitation is present  NORMAL LV AND RV SIZE AND FUNCTION  BORDERLINE PULMONARY HTN  NO SIGNIFICANT VALVULAR DYSFUNCTION  MILD MR NOTED               Assessment and Plan {CC Problem List  Visit Diagnosis  ROS  Review (Popup)  [employer] Maintenance  Quality  BestPractice  Medications  SmartSets  SnapShot Encounters  Media :23}   Diagnoses and all orders for this visit:    1. Pulmonary hypertension (HCC) (Primary)    2. MARGO on CPAP    3. Paroxysmal atrial fibrillation (CMS/HCC), s/p ablation     will get vq scan to evaluate for WHO group 4  She has sleep apnea which seems very compensated and unlikely to be causing woirsening pulmonary hypertension  Referral records indicate normal PFT; will get PFT record.  Assuming that is normal, Group 3 is not supported  She may have Group 2 and/or Group 1.  Right heart cath would be needed to confirm.  Will discuss with   Ange.  Continue cpap    Follow Up {Instructions Charge Capture  Follow-up Communications :23}   No follow-ups on file.  Patient was given instructions and counseling regarding her condition or for health maintenance advice. Please see specific information pulled into the AVS if appropriate.    Electronically signed by Franck Quintero MD, 11/1/2022, 13:41 CDT

## 2022-11-02 ENCOUNTER — HOSPITAL ENCOUNTER (OUTPATIENT)
Dept: ULTRASOUND IMAGING | Facility: HOSPITAL | Age: 72
Discharge: HOME | End: 2022-11-02
Admitting: NURSE PRACTITIONER

## 2022-11-02 DIAGNOSIS — E04.2 MULTINODULAR GOITER: ICD-10-CM

## 2022-11-02 PROCEDURE — 76942 ECHO GUIDE FOR BIOPSY: CPT

## 2022-11-02 PROCEDURE — 88112 CYTOPATH CELL ENHANCE TECH: CPT | Performed by: NURSE PRACTITIONER

## 2022-11-02 RX ORDER — LIDOCAINE HYDROCHLORIDE 10 MG/ML
5 INJECTION, SOLUTION INFILTRATION; PERINEURAL ONCE
Status: DISPENSED | OUTPATIENT
Start: 2022-11-02

## 2022-11-03 ENCOUNTER — TELEPHONE (OUTPATIENT)
Dept: OTOLARYNGOLOGY | Facility: CLINIC | Age: 72
End: 2022-11-03

## 2022-11-03 LAB
BEAKER LAB AP INTRAOPERATIVE CONSULTATION: NORMAL
CYTO UR: NORMAL
LAB AP CASE REPORT: NORMAL
LAB AP CLINICAL INFORMATION: NORMAL
Lab: NORMAL
PATH REPORT.FINAL DX SPEC: NORMAL
PATH REPORT.GROSS SPEC: NORMAL

## 2022-11-03 NOTE — TELEPHONE ENCOUNTER
----- Message from PORTER Rangel sent at 11/3/2022 10:38 AM CDT -----  [place] II benign follicular will continue to monitor with repeat ultrasounds

## 2022-11-08 ENCOUNTER — TELEPHONE (OUTPATIENT)
Dept: PULMONOLOGY | Facility: CLINIC | Age: 72
End: 2022-11-08

## 2022-11-08 DIAGNOSIS — I27.20 PULMONARY HYPERTENSION: Primary | ICD-10-CM

## 2022-11-08 NOTE — TELEPHONE ENCOUNTER
Per [facility] Scheduling the [facility] needs a chest x-ray ordered with procedure.

## 2022-11-14 DIAGNOSIS — I27.20 PULMONARY HYPERTENSION: Primary | ICD-10-CM

## 2022-11-16 ENCOUNTER — HOSPITAL ENCOUNTER (OUTPATIENT)
Dept: NUCLEAR MEDICINE | Facility: HOSPITAL | Age: 72
Discharge: HOME | End: 2022-11-16

## 2022-11-16 ENCOUNTER — HOSPITAL ENCOUNTER (OUTPATIENT)
Dept: GENERAL RADIOLOGY | Facility: HOSPITAL | Age: 72
Discharge: HOME | End: 2022-11-16
Admitting: INTERNAL MEDICINE

## 2022-11-16 DIAGNOSIS — I27.20 PULMONARY HYPERTENSION: ICD-10-CM

## 2022-11-16 PROCEDURE — 71046 X-RAY EXAM CHEST 2 VIEWS: CPT

## 2022-11-16 PROCEDURE — A9540 TC99M MAA: HCPCS | Performed by: INTERNAL MEDICINE

## 2022-11-16 PROCEDURE — 0 TECHNETIUM ALBUMIN AGGREGATED: Performed by: INTERNAL MEDICINE

## 2022-11-16 PROCEDURE — 78580 LUNG PERFUSION IMAGING: CPT

## 2022-11-16 RX ADMIN — KIT FOR THE PREPARATION OF TECHNETIUM TC 99M ALBUMIN AGGREGATED 1 DOSE: 2.5 INJECTION, POWDER, FOR SOLUTION INTRAVENOUS at 11:02

## 2022-11-22 ENCOUNTER — TELEPHONE (OUTPATIENT)
Dept: PULMONOLOGY | Facility: CLINIC | Age: 72
End: 2022-11-22

## 2022-11-22 NOTE — TELEPHONE ENCOUNTER
----- Message from Franck Quintero MD sent at 11/22/2022  8:28 AM CST -----  Regarding: FW: Right heart cath  Can you see if we can get her an appointment to see Dr. Cassidy at the [facility]?  thanks  ----- Message -----  From: Dionicio Cassidy MD  Sent: 11/21/2022   7:25 AM CST  To: Franck Quintero MD  Subject: Right heart cath                                 Patient is not seen by me at [facility]  She may be seen in [facility] [place] in [facility]  If you could let your office staff know to contact [facility] cardiology [facility] for follow-up with me there  ----- Message -----  From: Franck Quintero MD  Sent: 11/1/2022   2:46 PM CST  To: MD Dr. Yaritza Tovar sent her to me.  PA pressure may be going up, last read as moderate.  She has had intermittent edema and dyspnea.  I am checking VQ scan to eval for group 4.  Right heart cath is indicated - can you do?

## 2022-11-23 NOTE — TELEPHONE ENCOUNTER
Shelli from [facility] returned call and will schedule patient  appointment with Dr. Cassidy

## 2022-12-05 ENCOUNTER — TELEPHONE (OUTPATIENT)
Dept: CARDIOLOGY | Facility: CLINIC | Age: 72
End: 2022-12-05

## 2022-12-05 NOTE — TELEPHONE ENCOUNTER
Caller: DIEUDONNE    Relationship: DR. GRADY'S OFFICE    Best call back number: [phone]    What form or medical record are you requesting: LAST OFFICE VISIT WITH DR. CALLAWAY 8/23/22    Who is requesting this form or medical record from you: DIEUDONNE    How would you like to receive the form or medical records (pick-up, mail, fax): FAX  If fax, what is the fax number: [phone]      Timeframe paperwork needed: BY TOMORROW 12/6/22    Additional notes: REQUEST FROM DR. GRADY'S OFFSITE OFFICE FOR LAST NOTE WITH DR. CALLAWAY

## 2023-01-26 ENCOUNTER — OFFICE VISIT (OUTPATIENT)
Dept: PULMONOLOGY | Facility: CLINIC | Age: 73
End: 2023-01-26
Payer: MEDICARE

## 2023-01-26 VITALS
WEIGHT: 203 LBS | HEIGHT: 64 IN | BODY MASS INDEX: 34.66 KG/M2 | HEART RATE: 68 BPM | SYSTOLIC BLOOD PRESSURE: 138 MMHG | OXYGEN SATURATION: 97 % | DIASTOLIC BLOOD PRESSURE: 82 MMHG

## 2023-01-26 DIAGNOSIS — I48.0 PAROXYSMAL ATRIAL FIBRILLATION: ICD-10-CM

## 2023-01-26 DIAGNOSIS — I27.20 PULMONARY HYPERTENSION: Primary | ICD-10-CM

## 2023-01-26 DIAGNOSIS — Z99.89 OSA ON CPAP: ICD-10-CM

## 2023-01-26 DIAGNOSIS — G47.33 OSA ON CPAP: ICD-10-CM

## 2023-01-26 PROCEDURE — 99214 OFFICE O/P EST MOD 30 MIN: CPT | Performed by: INTERNAL MEDICINE

## 2023-01-26 NOTE — PROGRESS NOTES
"Background:  pt w pulm htn, hx ethan, paroxysmal afib. LVH   Chief Complaint  pulmonary hypertension    Subjective    History of Present Illness     Terra Garcia is here for follow up with [facility].  History of Present Illness  She had a cardiac ct at [facility], hasn't heard result yet.  She has not noticed any decline over the past few months.  She is not sure about her heart rhythm; she thinks sometimes she is in atrial fibrillation but cant tell, other times she can.  She continues with unchanged dyspnea on exertion.     Tobacco Use: Low Risk    • Smoking Tobacco Use: Never   • Smokeless Tobacco Use: Never   • Passive Exposure: Not on file      Current Outpatient Medications   Medication Instructions   • atorvastatin (LIPITOR) 20 mg, Oral, Nightly   • cloNIDine (CATAPRES) 0.1 mg, Oral, 2 Times Daily PRN   • CONTOUR NEXT TEST test strip USE AS DIRECTED TO check blood sugar daily   • estradiol (VAGIFEM) 10 MCG tablet vaginal tablet 1 tablet, Vaginal, 2 Times Weekly, Monday and Friday.   • fish oil 1,000 mg, Oral, 2 Times Daily   • fluticasone (FLONASE) 50 MCG/ACT nasal spray 1 spray, Nasal, Daily PRN   • lansoprazole (PREVACID) 15 mg, Oral, Daily   • lisinopril (PRINIVIL,ZESTRIL) 10 mg, Oral, Daily   • loratadine (CLARITIN) 10 mg, Oral, Daily   • magnesium oxide (MAG-OX) 400 mg, Oral, Daily   • metFORMIN (GLUCOPHAGE) 500 mg, Daily With Breakfast   • metoprolol tartrate (LOPRESSOR) 25 MG tablet TAKE ONE TABLET BY MOUTH TWICE DAILY   • Multiple Vitamins-Minerals (CENTRUM SILVER ULTRA WOMENS) tablet 1 tablet, Oral, Daily   • rivaroxaban (XARELTO) 20 mg, Oral, Daily With Dinner   • TRUEPLUS LANCETS 33G misc USE WITH GLUCOMETER TESTING ONCE DAILY   • vitamin B-12 (CYANOCOBALAMIN) 1,000 mcg, Oral, Daily   • vitamin D3 5,000 Units, Oral, Daily      Objective     Vital Signs:   /82   Pulse 68   Ht 162.6 cm (64\")   Wt 92.1 kg (203 lb)   SpO2 97% " Comment: RA  BMI 34.84 kg/m²   Physical Exam  Constitutional:       General: She is not in acute distress.     Appearance: She is well-developed. She is not ill-appearing or toxic-appearing.   HENT:      Head: Atraumatic.   Eyes:      General: No scleral icterus.     Conjunctiva/sclera: Conjunctivae normal.   Cardiovascular:      Rate and Rhythm: Normal rate and regular rhythm.      Heart sounds: S1 normal and S2 normal.      Comments: P2 slightly prominent  Pulmonary:      Effort: Pulmonary effort is normal.      Breath sounds: Normal breath sounds.   Abdominal:      General: There is no distension.   Musculoskeletal:         General: No deformity.      Cervical back: Neck supple.   Skin:     Coloration: Skin is not pale.      Findings: No rash.   Neurological:      Mental Status: She is alert.        Result Review  Data Reviewed:  [facility] Lung Scan Perfusion Particulate (11/16/2022 11:28) normal  XR Chest 2 View (11/16/2022 10:40)                 Unless the coronary ct chest directs us elsewhere, I recommend right heart cath to determine PA pressure to guide tx.     Assessment and Plan    Diagnoses and all orders for this visit:    1. Pulmonary hypertension (HCC) (Primary)    2. MARGO on CPAP    3. Paroxysmal atrial fibrillation (CMS/HCC), s/p ablation     we reviewed vq scan and cxr, not suggestive of group 4.  Await ct coronary study  Will discuss with Dr. Cassidy re right heart cath.  Continue cpap for margo, stable    Follow Up   Return in about 6 months (around 7/26/2023).  Patient was given instructions and counseling regarding her condition or for health maintenance advice. Please see specific information pulled into the AVS if appropriate.    Electronically signed by Franck Quintero MD, 1/26/2023, 17:14 CST

## 2023-03-07 ENCOUNTER — OFFICE VISIT (OUTPATIENT)
Dept: CARDIOLOGY | Facility: CLINIC | Age: 73
End: 2023-03-07
Payer: MEDICARE

## 2023-03-07 VITALS
DIASTOLIC BLOOD PRESSURE: 84 MMHG | SYSTOLIC BLOOD PRESSURE: 132 MMHG | BODY MASS INDEX: 33.97 KG/M2 | HEART RATE: 73 BPM | OXYGEN SATURATION: 99 % | WEIGHT: 199 LBS | HEIGHT: 64 IN

## 2023-03-07 DIAGNOSIS — I27.20 PULMONARY HTN: ICD-10-CM

## 2023-03-07 DIAGNOSIS — G47.33 OSA ON CPAP: ICD-10-CM

## 2023-03-07 DIAGNOSIS — Z99.89 OSA ON CPAP: ICD-10-CM

## 2023-03-07 DIAGNOSIS — R00.2 PALPITATIONS: ICD-10-CM

## 2023-03-07 DIAGNOSIS — I48.0 PAROXYSMAL ATRIAL FIBRILLATION: Primary | ICD-10-CM

## 2023-03-07 PROCEDURE — 93000 ELECTROCARDIOGRAM COMPLETE: CPT | Performed by: PHYSICIAN ASSISTANT

## 2023-03-07 PROCEDURE — 99214 OFFICE O/P EST MOD 30 MIN: CPT | Performed by: PHYSICIAN ASSISTANT

## 2023-03-07 NOTE — PROGRESS NOTES
[facility] HEART GROUP -  CLINIC FOLLOW UP     Patient Care Team:  Eliseo Vigil MD as PCP - General (Internal Medicine)  Cuco Castellanos MD as Consulting Physician (Otolaryngology)  Dionicio Cassidy MD as Cardiologist (Cardiology)  Adair Eldridge PA as Physician Assistant (Otolaryngology)  Franck Quintero MD as Consulting Physician (Pulmonary Disease)    Chief Complaint: PAF/SVT follow up     Subjective    1. Paroxysmal atrial fibrillation  - 4/4/2018:  PVI at [facility] - uncertain modality   2.  SVT  - 7/5/22 Holter monitor with 94 runs of SVT, longest lasting 13 minutes in duration w/ average HR of 123 bpm     Cardiology history:  1.  HTN  2. Pulmonary HTN on echo      Medical History:  1.  Multinodular goiter  2.  DM2  3.  MARGO  -CPAP compliant     HPI: Today I had the pleasure of seeing Terra Garcia in the cardiology clinic for follow up. She was previously seen by Dr. Alejandre in August 2022 for recurrent persistent nonsustained palpitations. Holter monitor at that time revealed intermittent SVT lasting up to 13 minutes that she would manage with metoprolol. Dr. Alejandre felt that these episodes looked to be consistent with atrial tachycardia. However, there was also the finding on pulmonary HTN on her echo, which could have been related to pulmonary vein stenosis, although unlikely. The patient deferred any further testing at that time, however, she has recently seen pulmonology with negative VQ scan. CT of coronaries has been recommended and she states that was performed at [facility]. She has upcoming appt with Dr. Cassidy next month.     Since her last visit, she feels her palpitations have decreased in frequency and her breathing has also improved. Bps are well controlled in 120s/70s or less. She denies any ER visits or hospitalizations elsewhere. Overall, she feels that her quality of life is acceptable and she has enough energy to do what she wants to do.     Objective  "    Visit Vitals  /84   Pulse 73   Ht 162.6 cm (64.02\")   Wt 90.3 kg (199 lb)   SpO2 99%   BMI 34.14 kg/m²           Vitals reviewed.   Constitutional:       Appearance: Healthy appearance. Not in distress.   Eyes:      Extraocular Movements: Extraocular movements intact.      Conjunctiva/sclera: Conjunctivae normal.      Pupils: Pupils are equal, round, and reactive to light.   HENT:      Head: Normocephalic and atraumatic.      Nose: Nose normal.    Mouth/Throat:      Lips: Pink.      Mouth: Mucous membranes are moist.      Pharynx: Oropharynx is clear.   Neck:      Vascular: No carotid bruit or JVD. JVD normal.   Pulmonary:      Effort: Pulmonary effort is normal.      Breath sounds: Normal breath sounds.   Chest:      Chest wall: Not tender to palpatation.   Cardiovascular:      PMI at left midclavicular line. Normal rate. Regular rhythm. Normal S1. Normal S2.      Murmurs: There is a grade 2/6 systolic murmur.      No gallop. No rub.   Pulses:     Radial: 2+ bilaterally.     Posterior tibial: 2+ bilaterally.  Edema:     Peripheral edema present.     Pretibial: bilateral 1+ edema of the pretibial area.     Ankle: bilateral 1+ edema of the ankle.  Abdominal:      General: Bowel sounds are normal.      Palpations: Abdomen is soft.   Musculoskeletal: Normal range of motion.      Extremities: No clubbing present.     Cervical back: Normal range of motion. Skin:     General: Skin is warm and dry.   Neurological:      General: No focal deficit present.      Mental Status: Alert and oriented to person, place, and time.      Cranial Nerves: Cranial nerves are intact.   Psychiatric:         Attention and Perception: Attention normal.         Mood and Affect: Affect normal.         Speech: Speech normal.         Behavior: Behavior normal.         Cognition and Memory: Cognition normal.             The following portions of the patient's history were reviewed and updated as appropriate: allergies, current " medications, past medical history, past social history, past and problem list.     Review of Systems   Constitutional: Negative.    HENT: Negative.    Eyes: Negative.    Respiratory: Negative.    Cardiovascular: Negative.    Gastrointestinal: Negative.    Endocrine: Negative.    Genitourinary: Negative.    Musculoskeletal: Negative.    Skin: Negative.    Allergic/Immunologic: Negative.    Neurological: Negative.    Hematological: Negative.    Psychiatric/Behavioral: Negative.           Echo EF Estimated  Lab Results   Component Value Date    ECHOEFEST 60 07/18/2019         ECG 12 Lead    Date/Time: 3/7/2023 9:47 AM  Performed by: Viry Espinoza PA  Authorized by: Viry Espinoaz PA   Comparison: compared with previous ECG   Rhythm: sinus rhythm  Conduction: right bundle branch block  Other findings: T wave abnormality              Medication Review: yes    Current Outpatient Medications:   •  atorvastatin (LIPITOR) 20 MG tablet, Take 1 tablet by mouth Every Night., Disp: , Rfl:   •  cloNIDine (Catapres) 0.1 MG tablet, Take 1 tablet by mouth 2 (Two) Times a Day As Needed for High Blood Pressure (> 150/90 mm Hg)., Disp: 60 tablet, Rfl: 11  •  CONTOUR NEXT TEST test strip, USE AS DIRECTED TO check blood sugar daily, Disp: , Rfl: 5  •  estradiol (VAGIFEM) 10 MCG tablet vaginal tablet, Insert 1 tablet into the vagina 2 (Two) Times a Week. Monday and Friday., Disp: , Rfl:   •  fluticasone (FLONASE) 50 MCG/ACT nasal spray, 1 spray into the nostril(s) as directed by provider Daily As Needed for Allergies., Disp: , Rfl:   •  lansoprazole (PREVACID) 15 MG capsule, Take 1 capsule by mouth Daily., Disp: , Rfl:   •  lisinopril (PRINIVIL,ZESTRIL) 10 MG tablet, Take 1 tablet by mouth Daily., Disp: 90 tablet, Rfl: 4  •  loratadine (CLARITIN) 10 MG tablet, Take 1 tablet by mouth Daily., Disp: , Rfl:   •  magnesium oxide (MAG-OX) 400 MG tablet, Take 1 tablet by mouth Daily., Disp: , Rfl:   •  metoprolol tartrate (LOPRESSOR) 25  MG tablet, TAKE ONE TABLET BY MOUTH TWICE DAILY, Disp: 180 tablet, Rfl: 3  •  Multiple Vitamins-Minerals (CENTRUM SILVER ULTRA WOMENS) tablet, Take 1 tablet by mouth Daily., Disp: , Rfl:   •  Omega-3 Fatty Acids (FISH OIL) 1000 MG capsule capsule, Take 1 capsule by mouth 2 (Two) Times a Day., Disp: , Rfl:   •  rivaroxaban (XARELTO) 20 MG tablet, Take 1 tablet by mouth Daily With Dinner., Disp: 30 tablet, Rfl: 0  •  TRUEPLUS LANCETS 33G misc, USE WITH GLUCOMETER TESTING ONCE DAILY, Disp: , Rfl: 5  •  vitamin B-12 (CYANOCOBALAMIN) 1000 MCG tablet, Take 1 tablet by mouth Daily., Disp: , Rfl:   •  vitamin D3 125 MCG (5000 UT) capsule capsule, Take 1 capsule by mouth Daily., Disp: , Rfl:     Current Facility-Administered Medications:   •  lidocaine (XYLOCAINE) 1 % injection 5 mL, 5 mL, Subcutaneous, Once, Thuy Monreal MD   Allergies   Allergen Reactions   • Terramycin [Oxytetracycline]        I have reviewed       Lab Results   Component Value Date    GLUCOSE 80 07/19/2019    CALCIUM 9.1 07/19/2019     07/19/2019    K 4.2 07/19/2019    CO2 29.0 07/19/2019     07/19/2019    BUN 14 07/19/2019    CREATININE 0.71 07/19/2019    BCR 19.7 07/19/2019    ANIONGAP 7.0 07/19/2019       Lab Results - Last 18 Months   Lab Units 09/07/21  1100   TSH uIU/mL 1.590       Results for orders placed during the hospital encounter of 07/17/19    Adult Transthoracic Echo Complete W/ Cont if Necessary Per Protocol    Interpretation Summary  · Estimated EF = 60%.  · Left ventricular wall thickness is consistent with hypertrophy. Sigmoid-shaped ventricular septum is present.  · Left ventricular diastolic dysfunction.  · Mild pulmonary hypertension is present  · Small patent foramen ovale present with right to left shunting indicated by color flow Doppler.  · There is no evidence of pericardial effusion.     Assessment:   Diagnoses and all orders for this visit:    1. Paroxysmal atrial fibrillation (CMS/HCC), s/p ablation   (Primary)    2. Palpitations    3. MARGO on CPAP    4. Pulmonary HTN (HCC)    Other orders  -     ECG 12 Lead    Pulmonary HTN; Negative VQ scan, awaiting CT coronary result. Discussed that the gold standard of diagnosis and evaluation would be RHC, which Dr. Cassidy would be able to perform if she would like to pursue this. Depending on that result, her PH may be WHO Group 2.   -BP well controlled  -Compliant with MARGO  -Working on weight loss    HTN: Well controlled at home.     PAF with previous PVI/Palpitations: Overall controlled and improved per patient. She does not prefer any further intervention at this time.   -Continue xarelto for anticoagulation, CAHDsVASC score of 3    Atrial tachycardia: Discussed that should her symptoms increase in severity or frequency, she can decide if antiarrhythmic therapy would work for her or she's a reasonable candidate for ablation by Dr. Alejandre.   -Follow up in 6 months     I spent 30 minutes caring for Terra on this date of service. This time includes time spent by me in the following activities:preparing for the visit, reviewing tests, obtaining and/or reviewing a separately obtained history, performing a medically appropriate examination and/or evaluation , counseling and educating the patient/family/caregiver, ordering medications, tests, or procedures, referring and communicating with other health care professionals  and documenting information in the medical record        Electronically signed by AGUS Wright

## 2023-04-12 ENCOUNTER — OFFICE VISIT (OUTPATIENT)
Dept: OTOLARYNGOLOGY | Facility: CLINIC | Age: 73
End: 2023-04-12
Payer: MEDICARE

## 2023-04-12 ENCOUNTER — LAB (OUTPATIENT)
Dept: LAB | Facility: HOSPITAL | Age: 73
End: 2023-04-12
Payer: MEDICARE

## 2023-04-12 VITALS
TEMPERATURE: 97.3 F | SYSTOLIC BLOOD PRESSURE: 133 MMHG | HEIGHT: 64 IN | BODY MASS INDEX: 33.63 KG/M2 | HEART RATE: 63 BPM | DIASTOLIC BLOOD PRESSURE: 82 MMHG | WEIGHT: 197 LBS

## 2023-04-12 DIAGNOSIS — E04.2 MULTINODULAR GOITER: ICD-10-CM

## 2023-04-12 DIAGNOSIS — K21.9 LARYNGOPHARYNGEAL REFLUX: ICD-10-CM

## 2023-04-12 DIAGNOSIS — E04.2 MULTINODULAR GOITER: Primary | ICD-10-CM

## 2023-04-12 PROCEDURE — 84480 ASSAY TRIIODOTHYRONINE (T3): CPT

## 2023-04-12 PROCEDURE — 84436 ASSAY OF TOTAL THYROXINE: CPT

## 2023-04-12 PROCEDURE — 36415 COLL VENOUS BLD VENIPUNCTURE: CPT

## 2023-04-12 PROCEDURE — 1160F RVW MEDS BY RX/DR IN RCRD: CPT | Performed by: NURSE PRACTITIONER

## 2023-04-12 PROCEDURE — 99213 OFFICE O/P EST LOW 20 MIN: CPT | Performed by: NURSE PRACTITIONER

## 2023-04-12 PROCEDURE — 84443 ASSAY THYROID STIM HORMONE: CPT

## 2023-04-12 PROCEDURE — 1159F MED LIST DOCD IN RCRD: CPT | Performed by: NURSE PRACTITIONER

## 2023-04-12 PROCEDURE — 3079F DIAST BP 80-89 MM HG: CPT | Performed by: NURSE PRACTITIONER

## 2023-04-12 PROCEDURE — 3075F SYST BP GE 130 - 139MM HG: CPT | Performed by: NURSE PRACTITIONER

## 2023-04-12 RX ORDER — CALCIUM CARBONATE/VITAMIN D3 500-10/5ML
400 LIQUID (ML) ORAL
COMMUNITY
End: 2023-04-12

## 2023-04-12 RX ORDER — ESTRADIOL 10 UG/1
10 INSERT VAGINAL 2 TIMES WEEKLY
COMMUNITY

## 2023-04-12 RX ORDER — METOPROLOL SUCCINATE 25 MG/1
1 TABLET, EXTENDED RELEASE ORAL EVERY 12 HOURS SCHEDULED
COMMUNITY
Start: 2023-03-13

## 2023-04-12 RX ORDER — OMEPRAZOLE 40 MG/1
40 CAPSULE, DELAYED RELEASE ORAL 2 TIMES DAILY
Qty: 60 CAPSULE | Refills: 3 | Status: SHIPPED | OUTPATIENT
Start: 2023-04-12 | End: 2023-05-12

## 2023-04-12 RX ORDER — GINGER ROOT/GINGER ROOT EXT 262.5 MG
CAPSULE ORAL
COMMUNITY
Start: 2022-12-12

## 2023-04-12 NOTE — PROGRESS NOTES
YOB: 1950  Location: [facility]  Location Address: [address] [place] [address], [facility], [address] [place] [address]  Location Phone: [phone]    Chief Complaint   Patient presents with   • Thyroid Problem   • Oral Pain     Sensitive to foods and drink.  Feels raw and tender.  Food doesn't taste as it should.  This started about 3 months         History of Present Illness  Terra Garcia is a 72 y.o. female.  Terra Garcia is here for follow up of ENT complaints. The patient has had problems with thyroid nodules   Symptoms are localized to bilateral thyroid   She has had no obvious clinical symptoms. Largest nodule underwent fine needle aspiration 2022 with benign pathology   She denies sore throat, hoarseness or difficulty swallowing      She complains today of her mouth feeling raw. She states this started approximately 3 months ago. She states this occurs daily and is relatively constant in nature   She takes prevacid daily     US Thyroid (2023 13:49)  Fine Needle Aspiration (2022 10:43)       Past Medical History:   Diagnosis Date   • Abnormal ECG    • Allergic rhinitis    • Arrhythmia    • Arthritis    • Atrial fibrillation    • Chronic sinusitis    • Diabetes mellitus    • Dizziness    • GERD (gastroesophageal reflux disease)    • Hyperlipidemia     Mixed   • Hypertension    • Hypertrophy of inferior nasal turbinate    • Imbalance    • Palpitations    • Pulmonary HTN 3/7/2023   • Sleep apnea, obstructive    • Thyroid nodule    • Tremor        Past Surgical History:   Procedure Laterality Date   • ABLATION OF DYSRHYTHMIC FOCUS     • CARDIAC ABLATION     • FINGER SURGERY     • TONSILLECTOMY     • TUBAL ABDOMINAL LIGATION         Outpatient Medications Marked as Taking for the 23 encounter (Office Visit) with Mary Ibarra APRN   Medication Sig Dispense Refill   • atorvastatin (LIPITOR) 20 MG tablet Take 1 tablet by mouth Every Night.     • Calcium Carb-Cholecalciferol 600-20  MG-MCG tablet      • CONTOUR NEXT TEST test strip USE AS DIRECTED TO check blood sugar daily  5   • estradiol (VAGIFEM) 10 MCG tablet vaginal tablet Insert 1 tablet into the vagina 2 (Two) Times a Week.     • fluticasone (FLONASE) 50 MCG/ACT nasal spray 1 spray into the nostril(s) as directed by provider Daily As Needed for Allergies.     • lisinopril (PRINIVIL,ZESTRIL) 10 MG tablet Take 1 tablet by mouth Daily. 90 tablet 4   • loratadine (CLARITIN) 10 MG tablet Take 1 tablet by mouth Daily.     • magnesium oxide (MAG-OX) 400 MG tablet Take 1 tablet by mouth Daily.     • metFORMIN (GLUCOPHAGE) 500 MG tablet      • metoprolol succinate XL (TOPROL-XL) 25 MG 24 hr tablet Take 1 tablet by mouth Every 12 (Twelve) Hours.     • Multiple Vitamins-Minerals (CENTRUM SILVER ULTRA WOMENS) tablet Take 1 tablet by mouth Daily.     • Omega-3 Fatty Acids (FISH OIL) 1000 MG capsule capsule Take 1 capsule by mouth 2 (Two) Times a Day.     • rivaroxaban (XARELTO) 20 MG tablet Take 1 tablet by mouth Daily With Dinner. 30 tablet 0   • TRUEPLUS LANCETS 33G misc USE WITH GLUCOMETER TESTING ONCE DAILY  5   • vitamin B-12 (CYANOCOBALAMIN) 1000 MCG tablet Take 1 tablet by mouth Daily.     • [DISCONTINUED] lansoprazole (PREVACID) 15 MG capsule Take 1 capsule by mouth Daily.       Current Facility-Administered Medications for the 4/12/23 encounter (Office Visit) with Mary Ibarra APRN   Medication Dose Route Frequency Provider Last Rate Last Admin   • lidocaine (XYLOCAINE) 1 % injection 5 mL  5 mL Subcutaneous Once Thuy Monreal MD           Terramycin [oxytetracycline]    Family History   Problem Relation Age of Onset   • Heart disease Mother    • Heart attack Mother    • Hypertension Mother    • Atrial fibrillation Father    • Cancer Father    • Hypertension Father    • Arrhythmia Father         A fib       Social History     Socioeconomic History   • Marital status:    Tobacco Use   • Smoking status: Never   • Smokeless  tobacco: Never   Vaping Use   • Vaping Use: Never used   Substance and Sexual Activity   • Alcohol use: Not Currently     Comment: rare   • Drug use: Never   • Sexual activity: Not Currently     Partners: Male     Birth control/protection: Surgical, Tubal ligation     Comment: postmenopausal       Review of Systems   Constitutional: Negative.    HENT: Negative for sore throat and trouble swallowing.         Admits oral pain      Endocrine: Negative.        Vitals:    04/12/23 1416   BP: 133/82   Pulse: 63   Temp: 97.3 °F (36.3 °C)       Body mass index is 33.81 kg/m².    Objective     Physical Exam  Vitals reviewed.   Constitutional:       Appearance: Normal appearance. She is obese.   HENT:      Head: Normocephalic.      Right Ear: Tympanic membrane, ear canal and external ear normal.      Left Ear: Tympanic membrane, ear canal and external ear normal.      Nose: Nose normal.      Mouth/Throat:      Lips: Pink.      Mouth: Mucous membranes are moist.      Pharynx: Uvula midline.      Comments: No oral lesions or masses appreciated by visualization or manual palpation     Neck:      Comments: No overt thyromegaly   Palpable thyroid nodule left     Musculoskeletal:      Cervical back: Full passive range of motion without pain.   Neurological:      Mental Status: She is alert.         Assessment & Plan   Diagnoses and all orders for this visit:    1. Multinodular goiter (Primary)  -     TSH; Future  -     T3; Future  -     T4; Future  -     US Thyroid; Future    2. Laryngopharyngeal reflux    Other orders  -     omeprazole (priLOSEC) 40 MG capsule; Take 1 capsule by mouth 2 (Two) Times a Day for 30 days. Take 30 minutes to 1 hour before meals  Dispense: 60 capsule; Refill: 3      * Surgery not found *  Orders Placed This Encounter   Procedures   • US Thyroid     Standing Status:   Future     Standing Expiration Date:   4/12/2024     Order Specific Question:   Reason for Exam:     Answer:   Thyroid disease   • TSH      Standing Status:   Future     Number of Occurrences:   1     Standing Expiration Date:   4/12/2024   • T3     Standing Status:   Future     Number of Occurrences:   1     Standing Expiration Date:   4/12/2024     Order Specific Question:   Release to patient     Answer:   Routine Release   • T4     Standing Status:   Future     Number of Occurrences:   1     Standing Expiration Date:   4/12/2024     Return in about 2 months (around 6/12/2023) for Recheck.     Stop prevacid and start omeprazole   Watch for s/s bleeding due to ppi and xarelto use   Thyroid labs today   Repeat thyroid ultrasound in 6 months     Patient Instructions   Gastroesophageal Reflux Disease (Laryngopharyngeal Reflux), Adult  Gastroesophageal reflux disease (GERD) and/or Laryngopharyngeal Reflux, (LPR) happens when acid from your stomach flows up into the esophagus and/or throat and voicebox or larynx. When acid comes in contact with the these organs, the acid can cause soreness (inflammation). Over time, GERD may create small holes (ulcers) in the lining of the esophagus and may lead to the development of hoarseness, difficulty swallowing,   feeling of something stuck in the throat, increased mucous or drainage and even predispose to the development of malignancies, (cancer).    CAUSES   · Increased body weight. This puts pressure on the stomach, making acid rise from the stomach into the esophagus.  · Smoking. This increases acid production in the stomach.  · Drinking alcohol. This causes decreased pressure in the lower esophageal sphincter (valve or ring of muscle between the esophagus and stomach), allowing acid from the stomach into the esophagus.  · Late evening meals and a full stomach. This increases pressure and acid production in the stomach.  · A malformed lower esophageal sphincter  · Diet which can include avoidance of gluten and dairy products  · Age  SYMPTOMS   · Burning pain in the lower part of the mid-chest behind the  breastbone and in the mid-stomach area. This may occur twice a week or more often.  · Trouble swallowing.  · Sore throat.  · Dry cough.  · Asthma-like symptoms including chest tightness, shortness of breath, or wheezing.  · Globus sensation-something stuck in the throat/fullness  · Hoarseness  DIAGNOSIS   Your caregiver may be able to diagnose GERD based on your symptoms. In some cases, X-rays and other tests may be done to check for complications or to check the condition of your stomach and esophagus.  You may need to see another doctor.  TREATMENT   Over-the-counter or prescription medicines to help decrease acid production.   Dietary and behavioral modifications or changes may be also recommended.  HOME CARE INSTRUCTIONS   · Change the factors that you can control. Ask your caregiver for guidance concerning weight loss, quitting smoking, and alcohol consumption.  · Avoid foods and drinks that make your symptoms worse, and MAY include such as:  ¨ Caffeine or alcoholic drinks.  ¨ Chocolate.  ¨ Gluten containing foods  ¨ Dairy  ¨ Peppermint or mint flavorings.  ¨ Garlic and onions.  ¨ Spicy foods.  ¨ Citrus fruits, such as oranges, jenni, or limes.  ¨ Tomato-based foods such as sauce, chili, salsa, and pizza.  ¨ Fried and fatty foods.  · Avoid lying down for the 3 hours prior to your bedtime or prior to taking a nap.  · Eat small, frequent meals instead of large meals.  · Wear loose-fitting clothing. Do not wear anything tight around your waist that causes pressure on your stomach.  · Raise the head of your bed 6 to 8 inches with wood blocks to help you sleep. Extra pillows will not help.  · Only take over-the-counter or prescription medicines for pain, discomfort, or fever as directed by your caregiver.  · Do not take aspirin, ibuprofen, or other nonsteroidal anti-inflammatory drugs if possible (NSAIDs).  SEEK IMMEDIATE MEDICAL CARE IF:   · You have pain in your arms, neck, jaw, teeth, or back.  · Your pain  increases or changes in intensity or duration.  · You develop nausea, vomiting, or sweating (diaphoresis).  · You develop shortness of breath, or you faint.  · Your vomit is green, yellow, black, or looks like coffee grounds or blood.  · Your stool is red, bloody, or black.  These symptoms could be signs of other problems, such as heart disease, gastric bleeding, or esophageal bleeding.  MAKE SURE YOU:   · Understand these instructions.  · Will watch your condition.  · Will get help right away if you are not doing well or get worse.     This information is not intended to replace advice given to you by your physician. Make sure you discuss any questions you have with your health care provider.     Modified by Cuco Castellanos MD, FACS 9/8/2016.  Document Released: 09/27/2006 Document Revised: 01/08/2016 Document Reviewed: 04/13/2016  [employer] Interactive Patient Education ©2016 [employer] The patient has a thyroid nodule, which is relatively small, and studies do not suggest a malignancy. I have recommended observation with follow-up with me for repeat ultrasound. I explained the pathology of thyroid nodules including the risks of cancer. The options of surgery were discussed, but the patient wants to pursue an observational course for now, which is reasonable. The patient wishes to continue to defer surgery at this time.

## 2023-04-12 NOTE — PATIENT INSTRUCTIONS
Gastroesophageal Reflux Disease (Laryngopharyngeal Reflux), Adult  Gastroesophageal reflux disease (GERD) and/or Laryngopharyngeal Reflux, (LPR) happens when acid from your stomach flows up into the esophagus and/or throat and voicebox or larynx. When acid comes in contact with the these organs, the acid can cause soreness (inflammation). Over time, GERD may create small holes (ulcers) in the lining of the esophagus and may lead to the development of hoarseness, difficulty swallowing,   feeling of something stuck in the throat, increased mucous or drainage and even predispose to the development of malignancies, (cancer).    CAUSES   Increased body weight. This puts pressure on the stomach, making acid rise from the stomach into the esophagus.  Smoking. This increases acid production in the stomach.  Drinking alcohol. This causes decreased pressure in the lower esophageal sphincter (valve or ring of muscle between the esophagus and stomach), allowing acid from the stomach into the esophagus.  Late evening meals and a full stomach. This increases pressure and acid production in the stomach.  A malformed lower esophageal sphincter  Diet which can include avoidance of gluten and dairy products  Age  SYMPTOMS   Burning pain in the lower part of the mid-chest behind the breastbone and in the mid-stomach area. This may occur twice a week or more often.  Trouble swallowing.  Sore throat.  Dry cough.  Asthma-like symptoms including chest tightness, shortness of breath, or wheezing.  Globus sensation-something stuck in the throat/fullness  Hoarseness  DIAGNOSIS   Your caregiver may be able to diagnose GERD based on your symptoms. In some cases, X-rays and other tests may be done to check for complications or to check the condition of your stomach and esophagus.  You may need to see another doctor.  TREATMENT   Over-the-counter or prescription medicines to help decrease acid production.   Dietary and behavioral modifications  or changes may be also recommended.  HOME CARE INSTRUCTIONS   Change the factors that you can control. Ask your caregiver for guidance concerning weight loss, quitting smoking, and alcohol consumption.  Avoid foods and drinks that make your symptoms worse, and MAY include such as:  Caffeine or alcoholic drinks.  Chocolate.  Gluten containing foods  Dairy  Peppermint or mint flavorings.  Garlic and onions.  Spicy foods.  Citrus fruits, such as oranges, jenni, or limes.  Tomato-based foods such as sauce, chili, salsa, and pizza.  Fried and fatty foods.  Avoid lying down for the 3 hours prior to your bedtime or prior to taking a nap.  Eat small, frequent meals instead of large meals.  Wear loose-fitting clothing. Do not wear anything tight around your waist that causes pressure on your stomach.  Raise the head of your bed 6 to 8 inches with wood blocks to help you sleep. Extra pillows will not help.  Only take over-the-counter or prescription medicines for pain, discomfort, or fever as directed by your caregiver.  Do not take aspirin, ibuprofen, or other nonsteroidal anti-inflammatory drugs if possible (NSAIDs).  SEEK IMMEDIATE MEDICAL CARE IF:   You have pain in your arms, neck, jaw, teeth, or back.  Your pain increases or changes in intensity or duration.  You develop nausea, vomiting, or sweating (diaphoresis).  You develop shortness of breath, or you faint.  Your vomit is green, yellow, black, or looks like coffee grounds or blood.  Your stool is red, bloody, or black.  These symptoms could be signs of other problems, such as heart disease, gastric bleeding, or esophageal bleeding.  MAKE SURE YOU:   Understand these instructions.  Will watch your condition.  Will get help right away if you are not doing well or get worse.     This information is not intended to replace advice given to you by your physician. Make sure you discuss any questions you have with your health care provider.     Modified by Cuco Castellanos,  MD, FACS 9/8/2016.  Document Released: 09/27/2006 Document Revised: 01/08/2016 Document Reviewed: 04/13/2016  [employer] Interactive Patient Education ©2016 [employer]. The patient has a thyroid nodule, which is relatively small, and studies do not suggest a malignancy. I have recommended observation with follow-up with me for repeat ultrasound. I explained the pathology of thyroid nodules including the risks of cancer. The options of surgery were discussed, but the patient wants to pursue an observational course for now, which is reasonable. The patient wishes to continue to defer surgery at this time.

## 2023-04-13 ENCOUNTER — TELEPHONE (OUTPATIENT)
Dept: OTOLARYNGOLOGY | Facility: CLINIC | Age: 73
End: 2023-04-13
Payer: MEDICARE

## 2023-04-13 LAB
T3 SERPL-MCNC: 90.1 NG/DL (ref 80–200)
T4 SERPL-MCNC: 5.04 MCG/DL (ref 4.5–11.7)
TSH SERPL DL<=0.05 MIU/L-ACNC: 1.21 UIU/ML (ref 0.27–4.2)

## 2023-04-13 NOTE — TELEPHONE ENCOUNTER
Patient notified    ----- Message from PORTER Rangel sent at 4/13/2023  8:39 AM CDT -----  Labs normal

## 2023-06-06 NOTE — PROGRESS NOTES
YOB: 1950  Location: [facility]  Location Address: [address] [place] [address], [facility], [address] [place] [address]  Location Phone: [phone]    Chief Complaint   Patient presents with    Heartburn       History of Present Illness  Terra Garcia is a 72 y.o. female.  Terra Garcia is here for follow up of ENT complaints. The patient has had problems with thyroid nodules and mouth soreness  At last visit patient reflux medication was switched to omeprazole and she feels as if that has helped significantly with reflux and oral pain   She reports her mouth and throat feeling back to normal. She denies mouth pain, sore throat or dysphagia             Past Medical History:   Diagnosis Date    Abnormal ECG     Allergic rhinitis     Arrhythmia     Arthritis     Atrial fibrillation     Chronic sinusitis     Diabetes mellitus     Dizziness     GERD (gastroesophageal reflux disease)     Hyperlipidemia     Mixed    Hypertension     Hypertrophy of inferior nasal turbinate     Imbalance     Palpitations     Pulmonary HTN 2023    Sleep apnea, obstructive     Thyroid nodule     Tinnitus     Tremor        Past Surgical History:   Procedure Laterality Date    ABLATION OF DYSRHYTHMIC FOCUS      CARDIAC ABLATION      FINGER SURGERY      TONSILLECTOMY      TUBAL ABDOMINAL LIGATION         Outpatient Medications Marked as Taking for the 23 encounter (Office Visit) with Cuco Castellanos MD   Medication Sig Dispense Refill    atorvastatin (LIPITOR) 20 MG tablet Take 1 tablet by mouth Every Night.      Calcium Carb-Cholecalciferol 600-20 MG-MCG tablet       CONTOUR NEXT TEST test strip USE AS DIRECTED TO check blood sugar daily  5    estradiol (VAGIFEM) 10 MCG tablet vaginal tablet Insert 1 tablet into the vagina 2 (Two) Times a Week.      fluticasone (FLONASE) 50 MCG/ACT nasal spray 1 spray into the nostril(s) as directed by provider Daily As Needed for Allergies.      lisinopril (PRINIVIL,ZESTRIL) 10 MG  tablet Take 1 tablet by mouth Daily. 90 tablet 4    loratadine (CLARITIN) 10 MG tablet Take 1 tablet by mouth Daily.      magnesium oxide (MAG-OX) 400 MG tablet Take 1 tablet by mouth Daily.      metFORMIN (GLUCOPHAGE) 500 MG tablet       metoprolol succinate XL (TOPROL-XL) 25 MG 24 hr tablet Take 1 tablet by mouth Every 12 (Twelve) Hours.      Multiple Vitamins-Minerals (CENTRUM SILVER ULTRA WOMENS) tablet Take 1 tablet by mouth Daily.      Omega-3 Fatty Acids (FISH OIL) 1000 MG capsule capsule Take 1 capsule by mouth 2 (Two) Times a Day.      omeprazole (priLOSEC) 40 MG capsule Take 1 capsule by mouth 2 (Two) Times a Day for 30 days. 60 capsule 2    rivaroxaban (XARELTO) 20 MG tablet Take 1 tablet by mouth Daily With Dinner. 30 tablet 0    TRUEPLUS LANCETS 33G misc USE WITH GLUCOMETER TESTING ONCE DAILY  5    vitamin B-12 (CYANOCOBALAMIN) 1000 MCG tablet Take 1 tablet by mouth Daily.      [DISCONTINUED] omeprazole (priLOSEC) 40 MG capsule TAKE ONE CAPSULE BY MOUTH TWICE DAILY take 30 MINUTES TO ONE hour prior TO meals       Current Facility-Administered Medications for the 6/8/23 encounter (Office Visit) with Cuco Castellanos MD   Medication Dose Route Frequency Provider Last Rate Last Admin    lidocaine (XYLOCAINE) 1 % injection 5 mL  5 mL Subcutaneous Once Thuy Monreal MD           Terramycin [oxytetracycline]    Family History   Problem Relation Age of Onset    Heart disease Mother     Heart attack Mother     Hypertension Mother     Migraines Mother     Atrial fibrillation Father     Cancer Father     Hypertension Father     Arrhythmia Father         A fib       Social History     Socioeconomic History    Marital status:    Tobacco Use    Smoking status: Never    Smokeless tobacco: Never   Vaping Use    Vaping Use: Never used   Substance and Sexual Activity    Alcohol use: Not Currently     Comment: rare    Drug use: Never    Sexual activity: Not Currently     Partners: Male     Birth  control/protection: Surgical, Tubal ligation     Comment: postmenopausal       Review of Systems   Constitutional: Negative.    HENT:  Negative for sore throat, trouble swallowing and voice change.      Vitals:    06/08/23 1348   BP: 107/74   Pulse: 76   Resp: 16   Temp: 97.7 °F (36.5 °C)       Body mass index is 33.47 kg/m².    Objective     Physical Exam  Vitals reviewed.   Constitutional:       Appearance: She is obese.   HENT:      Head: Normocephalic.      Right Ear: Tympanic membrane, ear canal and external ear normal.      Left Ear: Tympanic membrane, ear canal and external ear normal.      Nose: Nose normal.      Mouth/Throat:      Lips: Pink.      Mouth: Mucous membranes are moist.      Pharynx: Uvula midline.   Neurological:      Mental Status: She is alert.       Assessment & Plan   Diagnoses and all orders for this visit:    1. Laryngopharyngeal reflux (Primary)    Other orders  -     omeprazole (priLOSEC) 40 MG capsule; Take 1 capsule by mouth 2 (Two) Times a Day for 30 days.  Dispense: 60 capsule; Refill: 2      * Surgery not found *  No orders of the defined types were placed in this encounter.    Return in about 4 months (around 10/8/2023) for Recheck.     Continue reflux medications and precautions as discussed  Call for new/worsening concerns   Risks of ppi and xarelto discussed    Patient Instructions   Gastroesophageal Reflux Disease (Laryngopharyngeal Reflux), Adult  Gastroesophageal reflux disease (GERD) and/or Laryngopharyngeal Reflux, (LPR) happens when acid from your stomach flows up into the esophagus and/or throat and voicebox or larynx. When acid comes in contact with the these organs, the acid can cause soreness (inflammation). Over time, GERD may create small holes (ulcers) in the lining of the esophagus and may lead to the development of hoarseness, difficulty swallowing,   feeling of something stuck in the throat, increased mucous or drainage and even predispose to the development of  malignancies, (cancer).    CAUSES   Increased body weight. This puts pressure on the stomach, making acid rise from the stomach into the esophagus.  Smoking. This increases acid production in the stomach.  Drinking alcohol. This causes decreased pressure in the lower esophageal sphincter (valve or ring of muscle between the esophagus and stomach), allowing acid from the stomach into the esophagus.  Late evening meals and a full stomach. This increases pressure and acid production in the stomach.  A malformed lower esophageal sphincter  Diet which can include avoidance of gluten and dairy products  Age  SYMPTOMS   Burning pain in the lower part of the mid-chest behind the breastbone and in the mid-stomach area. This may occur twice a week or more often.  Trouble swallowing.  Sore throat.  Dry cough.  Asthma-like symptoms including chest tightness, shortness of breath, or wheezing.  Globus sensation-something stuck in the throat/fullness  Hoarseness  DIAGNOSIS   Your caregiver may be able to diagnose GERD based on your symptoms. In some cases, X-rays and other tests may be done to check for complications or to check the condition of your stomach and esophagus.  You may need to see another doctor.  TREATMENT   Over-the-counter or prescription medicines to help decrease acid production.   Dietary and behavioral modifications or changes may be also recommended.  HOME CARE INSTRUCTIONS   Change the factors that you can control. Ask your caregiver for guidance concerning weight loss, quitting smoking, and alcohol consumption.  Avoid foods and drinks that make your symptoms worse, and MAY include such as:  Caffeine or alcoholic drinks.  Chocolate.  Gluten containing foods  Dairy  Peppermint or mint flavorings.  Garlic and onions.  Spicy foods.  Citrus fruits, such as oranges, jenni, or limes.  Tomato-based foods such as sauce, chili, salsa, and pizza.  Fried and fatty foods.  Avoid lying down for the 3 hours prior to your  bedtime or prior to taking a nap.  Eat small, frequent meals instead of large meals.  Wear loose-fitting clothing. Do not wear anything tight around your waist that causes pressure on your stomach.  Raise the head of your bed 6 to 8 inches with wood blocks to help you sleep. Extra pillows will not help.  Only take over-the-counter or prescription medicines for pain, discomfort, or fever as directed by your caregiver.  Do not take aspirin, ibuprofen, or other nonsteroidal anti-inflammatory drugs if possible (NSAIDs).  SEEK IMMEDIATE MEDICAL CARE IF:   You have pain in your arms, neck, jaw, teeth, or back.  Your pain increases or changes in intensity or duration.  You develop nausea, vomiting, or sweating (diaphoresis).  You develop shortness of breath, or you faint.  Your vomit is green, yellow, black, or looks like coffee grounds or blood.  Your stool is red, bloody, or black.  These symptoms could be signs of other problems, such as heart disease, gastric bleeding, or esophageal bleeding.  MAKE SURE YOU:   Understand these instructions.  Will watch your condition.  Will get help right away if you are not doing well or get worse.     This information is not intended to replace advice given to you by your physician. Make sure you discuss any questions you have with your health care provider.     Modified by Cuco Castellanos MD, FACS 9/8/2016.  Document Released: 09/27/2006 Document Revised: 01/08/2016 Document Reviewed: 04/13/2016  [employer] Interactive Patient Education ©2016 [employer]

## 2023-06-08 ENCOUNTER — OFFICE VISIT (OUTPATIENT)
Dept: OTOLARYNGOLOGY | Facility: CLINIC | Age: 73
End: 2023-06-08
Payer: MEDICARE

## 2023-06-08 VITALS
BODY MASS INDEX: 33.29 KG/M2 | TEMPERATURE: 97.7 F | WEIGHT: 195 LBS | RESPIRATION RATE: 16 BRPM | HEART RATE: 76 BPM | DIASTOLIC BLOOD PRESSURE: 74 MMHG | SYSTOLIC BLOOD PRESSURE: 107 MMHG | HEIGHT: 64 IN

## 2023-06-08 DIAGNOSIS — K21.9 LARYNGOPHARYNGEAL REFLUX: Primary | ICD-10-CM

## 2023-06-08 RX ORDER — OMEPRAZOLE 40 MG/1
40 CAPSULE, DELAYED RELEASE ORAL 2 TIMES DAILY
Qty: 60 CAPSULE | Refills: 2 | Status: SHIPPED | OUTPATIENT
Start: 2023-06-08 | End: 2023-07-08

## 2023-06-08 RX ORDER — OMEPRAZOLE 40 MG/1
CAPSULE, DELAYED RELEASE ORAL
COMMUNITY
End: 2023-06-08 | Stop reason: SDUPTHER

## 2023-06-08 NOTE — PATIENT INSTRUCTIONS
Gastroesophageal Reflux Disease (Laryngopharyngeal Reflux), Adult  Gastroesophageal reflux disease (GERD) and/or Laryngopharyngeal Reflux, (LPR) happens when acid from your stomach flows up into the esophagus and/or throat and voicebox or larynx. When acid comes in contact with the these organs, the acid can cause soreness (inflammation). Over time, GERD may create small holes (ulcers) in the lining of the esophagus and may lead to the development of hoarseness, difficulty swallowing,   feeling of something stuck in the throat, increased mucous or drainage and even predispose to the development of malignancies, (cancer).    CAUSES   Increased body weight. This puts pressure on the stomach, making acid rise from the stomach into the esophagus.  Smoking. This increases acid production in the stomach.  Drinking alcohol. This causes decreased pressure in the lower esophageal sphincter (valve or ring of muscle between the esophagus and stomach), allowing acid from the stomach into the esophagus.  Late evening meals and a full stomach. This increases pressure and acid production in the stomach.  A malformed lower esophageal sphincter  Diet which can include avoidance of gluten and dairy products  Age  SYMPTOMS   Burning pain in the lower part of the mid-chest behind the breastbone and in the mid-stomach area. This may occur twice a week or more often.  Trouble swallowing.  Sore throat.  Dry cough.  Asthma-like symptoms including chest tightness, shortness of breath, or wheezing.  Globus sensation-something stuck in the throat/fullness  Hoarseness  DIAGNOSIS   Your caregiver may be able to diagnose GERD based on your symptoms. In some cases, X-rays and other tests may be done to check for complications or to check the condition of your stomach and esophagus.  You may need to see another doctor.  TREATMENT   Over-the-counter or prescription medicines to help decrease acid production.   Dietary and behavioral modifications  or changes may be also recommended.  HOME CARE INSTRUCTIONS   Change the factors that you can control. Ask your caregiver for guidance concerning weight loss, quitting smoking, and alcohol consumption.  Avoid foods and drinks that make your symptoms worse, and MAY include such as:  Caffeine or alcoholic drinks.  Chocolate.  Gluten containing foods  Dairy  Peppermint or mint flavorings.  Garlic and onions.  Spicy foods.  Citrus fruits, such as oranges, jenni, or limes.  Tomato-based foods such as sauce, chili, salsa, and pizza.  Fried and fatty foods.  Avoid lying down for the 3 hours prior to your bedtime or prior to taking a nap.  Eat small, frequent meals instead of large meals.  Wear loose-fitting clothing. Do not wear anything tight around your waist that causes pressure on your stomach.  Raise the head of your bed 6 to 8 inches with wood blocks to help you sleep. Extra pillows will not help.  Only take over-the-counter or prescription medicines for pain, discomfort, or fever as directed by your caregiver.  Do not take aspirin, ibuprofen, or other nonsteroidal anti-inflammatory drugs if possible (NSAIDs).  SEEK IMMEDIATE MEDICAL CARE IF:   You have pain in your arms, neck, jaw, teeth, or back.  Your pain increases or changes in intensity or duration.  You develop nausea, vomiting, or sweating (diaphoresis).  You develop shortness of breath, or you faint.  Your vomit is green, yellow, black, or looks like coffee grounds or blood.  Your stool is red, bloody, or black.  These symptoms could be signs of other problems, such as heart disease, gastric bleeding, or esophageal bleeding.  MAKE SURE YOU:   Understand these instructions.  Will watch your condition.  Will get help right away if you are not doing well or get worse.     This information is not intended to replace advice given to you by your physician. Make sure you discuss any questions you have with your health care provider.     Modified by Cuco Castellanos,  MD, FACS 9/8/2016.  Document Released: 09/27/2006 Document Revised: 01/08/2016 Document Reviewed: 04/13/2016  [employer] Interactive Patient Education ©2016 [employer]

## 2023-07-27 ENCOUNTER — OFFICE VISIT (OUTPATIENT)
Dept: PULMONOLOGY | Facility: CLINIC | Age: 73
End: 2023-07-27
Payer: MEDICARE

## 2023-07-27 VITALS
WEIGHT: 199 LBS | BODY MASS INDEX: 33.97 KG/M2 | SYSTOLIC BLOOD PRESSURE: 142 MMHG | OXYGEN SATURATION: 92 % | DIASTOLIC BLOOD PRESSURE: 82 MMHG | HEIGHT: 64 IN | HEART RATE: 72 BPM

## 2023-07-27 DIAGNOSIS — Z99.89 OSA ON CPAP: ICD-10-CM

## 2023-07-27 DIAGNOSIS — I27.20 PULMONARY HYPERTENSION: Primary | ICD-10-CM

## 2023-07-27 DIAGNOSIS — G47.33 OSA ON CPAP: ICD-10-CM

## 2023-07-27 PROCEDURE — 3077F SYST BP >= 140 MM HG: CPT | Performed by: INTERNAL MEDICINE

## 2023-07-27 PROCEDURE — 99213 OFFICE O/P EST LOW 20 MIN: CPT | Performed by: INTERNAL MEDICINE

## 2023-07-27 PROCEDURE — 3079F DIAST BP 80-89 MM HG: CPT | Performed by: INTERNAL MEDICINE

## 2023-07-27 RX ORDER — OMEPRAZOLE 40 MG/1
CAPSULE, DELAYED RELEASE ORAL
COMMUNITY
Start: 2023-07-10

## 2023-07-27 NOTE — PROGRESS NOTES
Background:  pt w pulm htn, hx ethan, paroxysmal afib. [facility]   Chief Complaint  Pulmonary hypertension    Subjective    History of Present Illness     Terra Garcia is here for follow up with [facility].  History of Present Illness  She had a new echo on Monday. She reports no decline since the last visit.  No increased dyspnea.  [place]  She has some leg swelling such as after driving or standing or sitting for long time.  No change in pattern of that in several years.  She uses cpap, compliant and benefiting, uses it all night long every night.   Tobacco Use: Low Risk     Smoking Tobacco Use: Never    Smokeless Tobacco Use: Never    Passive Exposure: Not on file      Current Outpatient Medications   Medication Instructions    atorvastatin (LIPITOR) 20 mg, Oral, Nightly    Calcium Carb-Cholecalciferol 600-20 MG-MCG tablet No dose, route, or frequency recorded.    CONTOUR NEXT TEST test strip USE AS DIRECTED TO check blood sugar daily    estradiol (VAGIFEM) 10 mcg, Vaginal, 2 Times Weekly    fish oil 1,000 mg, Oral, 2 Times Daily    fluticasone (FLONASE) 50 MCG/ACT nasal spray 1 spray, Nasal, Daily PRN    lisinopril (PRINIVIL,ZESTRIL) 10 mg, Oral, Daily    loratadine (CLARITIN) 10 mg, Oral, Daily    magnesium oxide (MAG-OX) 400 mg, Oral, Daily    metFORMIN (GLUCOPHAGE) 500 MG tablet No dose, route, or frequency recorded.    metoprolol succinate XL (TOPROL-XL) 25 MG 24 hr tablet 1 tablet, Oral, Every 12 Hours Scheduled    Multiple Vitamins-Minerals (CENTRUM SILVER ULTRA WOMENS) tablet 1 tablet, Oral, Daily    omeprazole (priLOSEC) 40 MG capsule TAKE ONE CAPSULE BY MOUTH TWICE DAILY FOR 30 DAYS    rivaroxaban (XARELTO) 20 mg, Oral, Daily With Dinner    TRUEPLUS LANCETS 33G misc USE WITH GLUCOMETER TESTING ONCE DAILY    vitamin B-12 (CYANOCOBALAMIN) 1,000 mcg, Oral, Daily      Objective     Vital Signs:   /82   Pulse 72   Ht  "162.6 cm (64\")   Wt 90.3 kg (199 lb)   SpO2 92% Comment: RA  BMI 34.16 kg/m²   Physical Exam  Constitutional:       General: She is not in acute distress.     Appearance: She is well-developed. She is not ill-appearing or toxic-appearing.   HENT:      Head: Atraumatic.   Eyes:      General: No scleral icterus.     Conjunctiva/sclera: Conjunctivae normal.   Cardiovascular:      Rate and Rhythm: Normal rate and regular rhythm.      Heart sounds: S1 normal and S2 normal.   Pulmonary:      Effort: Pulmonary effort is normal.      Breath sounds: Normal breath sounds. No wheezing.   Abdominal:      General: There is no distension.   Musculoskeletal:         General: No deformity.      Cervical back: Neck supple.   Skin:     Coloration: Skin is not pale.      Findings: No rash.   Neurological:      Mental Status: She is alert.      Result Review  Data Reviewed:                    Assessment and Plan    Diagnoses and all orders for this visit:    1. Pulmonary hypertension (Primary)    2. MARGO on CPAP    Will need to wait on echo result, done at [facility], results pending  Continue to observe, or consider rhc if pa pressure elevated on echo    Follow Up   Return in about 6 months (around 1/27/2024).  Patient was given instructions and counseling regarding her condition or for health maintenance advice. Please see specific information pulled into the AVS if appropriate.    Electronically signed by Franck Quintero MD, 7/27/2023, 15:07 CDT    "

## 2023-08-03 ENCOUNTER — TELEPHONE (OUTPATIENT)
Dept: PULMONOLOGY | Facility: CLINIC | Age: 73
End: 2023-08-03

## 2023-08-03 NOTE — TELEPHONE ENCOUNTER
Caller: ANJANA    Relationship to patient: Other    Best call back number: [phone]    Patient is needing: ANJANA FROM [facility] NEEDED A FAX NUMBER SO THAT SHE COULD SEND OVER A ECHO CARDIOGRAM FOR THE PATIENT FROM DR.COURTNEY CHRISTIE. WANTED TO MAKE SURE DR. ESPOSITO WAS AWARE THAT ECHO CARDIOGRAM WAS BEING SENT OVER TRIED FAX OFFICE [phone] AND RECEIVED AN ERROR SO HUB WAS WAS OFFERED AS WELL

## 2023-08-22 RX ORDER — OMEPRAZOLE 40 MG/1
CAPSULE, DELAYED RELEASE ORAL
Qty: 60 CAPSULE | Refills: 2 | Status: SHIPPED | OUTPATIENT
Start: 2023-08-22

## 2023-09-07 ENCOUNTER — OFFICE VISIT (OUTPATIENT)
Dept: CARDIOLOGY | Facility: CLINIC | Age: 73
End: 2023-09-07
Payer: MEDICARE

## 2023-09-07 VITALS
BODY MASS INDEX: 34.15 KG/M2 | WEIGHT: 200 LBS | SYSTOLIC BLOOD PRESSURE: 158 MMHG | OXYGEN SATURATION: 99 % | DIASTOLIC BLOOD PRESSURE: 82 MMHG | HEIGHT: 64 IN | HEART RATE: 68 BPM

## 2023-09-07 DIAGNOSIS — I27.20 PULMONARY HTN: ICD-10-CM

## 2023-09-07 DIAGNOSIS — I48.0 PAROXYSMAL ATRIAL FIBRILLATION: Primary | ICD-10-CM

## 2023-09-07 NOTE — PROGRESS NOTES
"[facility] -  CLINIC FOLLOW UP     Patient Care Team:  Eliseo Vigil MD as PCP - General (Internal Medicine)  Cuco Castellanos MD as Consulting Physician (Otolaryngology)  Dionicio Cassidy MD as Cardiologist (Cardiology)  Adair Eldridge PA as Physician Assistant (Otolaryngology)  Franck Quintero MD as Consulting Physician (Pulmonary Disease)    Chief Complaint:     Subjective   1. Paroxysmal atrial fibrillation  - 4/4/2018:  PVI at [place] - uncertain modality   2.  SVT  - 7/5/22 Holter monitor with 94 runs of SVT, longest lasting 13 minutes in duration w/ average HR of 123 bpm     Cardiology history:  1.  HTN  2. Pulmonary HTN on echo      Medical History:  1.  Multinodular goiter  2.  DM2  3.  MARGO  -CPAP compliant     HPI: Today I had the pleasure of seeing Terra Garcia in the cardiology clinic for follow up. She was previously seen by Dr. Alejandre in August 2022 for recurrent persistent nonsustained palpitations. Holter monitor at that time revealed intermittent SVT lasting up to 13 minutes that she would manage with metoprolol. Dr. Alejandre felt that these episodes looked to be consistent with atrial tachycardia. She has been controlled with Metoprolol overall.     Her breathing is stable. She has been busy overall with travels in [facility] in [facility]. She denies any Er visits or hospitalizations. She feels like she is functioning well, but has noted more dependent edema which may be related to all of her traveling recently. Currently not on diuretic therapy.     She follows with Dr. Cassidy's clinic at [facility] [place].  An echo in July which still demonstrated pulmonary HTN with normal right ventricle, normal size atria, unable to decipher RVSP due to bad scanning of the report.     Objective     Visit Vitals  /82   Pulse 68   Ht 162.6 cm (64.02\")   Wt 90.7 kg (200 lb)   SpO2 99%   BMI 34.31 kg/m²           Vitals reviewed.   Constitutional:       " Appearance: Healthy appearance. Not in distress.   Eyes:      Extraocular Movements: Extraocular movements intact.      Conjunctiva/sclera: Conjunctivae normal.      Pupils: Pupils are equal, round, and reactive to light.   HENT:      Head: Normocephalic and atraumatic.      Nose: Nose normal.    Mouth/Throat:      Lips: Pink.      Mouth: Mucous membranes are moist.      Pharynx: Oropharynx is clear.   Neck:      Vascular: No carotid bruit or JVD. JVD normal.   Pulmonary:      Effort: Pulmonary effort is normal.      Breath sounds: Normal breath sounds.   Chest:      Chest wall: Not tender to palpatation.   Cardiovascular:      PMI at left midclavicular line. Normal rate. Regular rhythm. Normal S1. Normal S2.       Murmurs: There is a grade 1/6 systolic murmur.      No gallop.  No rub.   Pulses:     Radial: 2+ bilaterally.     Posterior tibial: 1+ bilaterally.  Edema:     Peripheral edema absent.   Abdominal:      General: Bowel sounds are normal.      Palpations: Abdomen is soft.   Musculoskeletal: Normal range of motion.      Extremities: No clubbing present.     Cervical back: Normal range of motion. Skin:     General: Skin is warm and dry.   Neurological:      General: No focal deficit present.      Mental Status: Alert and oriented to person, place, and time.      Cranial Nerves: Cranial nerves are intact.   Psychiatric:         Attention and Perception: Attention normal.         Mood and Affect: Affect normal.         Speech: Speech normal.         Behavior: Behavior normal.         Cognition and Memory: Cognition normal.           The following portions of the patient's history were reviewed and updated as appropriate: allergies, current medications, past medical history, past social history, past and problem list.     Review of Systems   Constitutional: Negative.    HENT: Negative.     Eyes: Negative.    Respiratory: Negative.     Cardiovascular:  Positive for leg swelling.   Gastrointestinal: Negative.     Endocrine: Negative.    Genitourinary: Negative.    Musculoskeletal:  Positive for arthralgias and back pain.   Skin: Negative.    Allergic/Immunologic: Negative.    Neurological: Negative.    Hematological: Negative.    Psychiatric/Behavioral: Negative.          Echo EF Estimated  Lab Results   Component Value Date    ECHOEFEST 60 07/18/2019         ECG 12 Lead    Date/Time: 9/7/2023 9:54 AM  Performed by: Viry Espinoza PA  Authorized by: Viry Espinoza PA   Comparison: compared with previous ECG   Rhythm: sinus rhythm  Conduction: right bundle branch block          Medication Review: yes    Current Outpatient Medications:     atorvastatin (LIPITOR) 20 MG tablet, Take 1 tablet by mouth Every Night., Disp: , Rfl:     Calcium Carb-Cholecalciferol 600-20 MG-MCG tablet, , Disp: , Rfl:     CONTOUR NEXT TEST test strip, USE AS DIRECTED TO check blood sugar daily, Disp: , Rfl: 5    estradiol (VAGIFEM) 10 MCG tablet vaginal tablet, Insert 1 tablet into the vagina 2 (Two) Times a Week., Disp: , Rfl:     fluticasone (FLONASE) 50 MCG/ACT nasal spray, 1 spray into the nostril(s) as directed by provider Daily As Needed for Allergies., Disp: , Rfl:     lisinopril (PRINIVIL,ZESTRIL) 10 MG tablet, Take 1 tablet by mouth Daily., Disp: 90 tablet, Rfl: 4    loratadine (CLARITIN) 10 MG tablet, Take 1 tablet by mouth Daily., Disp: , Rfl:     magnesium oxide (MAG-OX) 400 MG tablet, Take 1 tablet by mouth Daily., Disp: , Rfl:     metFORMIN (GLUCOPHAGE) 500 MG tablet, , Disp: , Rfl:     metoprolol succinate XL (TOPROL-XL) 25 MG 24 hr tablet, Take 1 tablet by mouth Every 12 (Twelve) Hours., Disp: , Rfl:     Multiple Vitamins-Minerals (CENTRUM SILVER ULTRA WOMENS) tablet, Take 1 tablet by mouth Daily., Disp: , Rfl:     Omega-3 Fatty Acids (FISH OIL) 1000 MG capsule capsule, Take 1 capsule by mouth 2 (Two) Times a Day., Disp: , Rfl:     omeprazole (priLOSEC) 40 MG capsule, TAKE ONE CAPSULE BY MOUTH TWICE DAILY FOR 30 DAYS, Disp: 60  capsule, Rfl: 2    rivaroxaban (XARELTO) 20 MG tablet, Take 1 tablet by mouth Daily With Dinner., Disp: 30 tablet, Rfl: 0    TRUEPLUS LANCETS 33G misc, USE WITH GLUCOMETER TESTING ONCE DAILY, Disp: , Rfl: 5    vitamin B-12 (CYANOCOBALAMIN) 1000 MCG tablet, Take 1 tablet by mouth Daily., Disp: , Rfl:     Current Facility-Administered Medications:     lidocaine (XYLOCAINE) 1 % injection 5 mL, 5 mL, Subcutaneous, Once, Thuy Monreal MD   Allergies   Allergen Reactions    Terramycin [Oxytetracycline]        I have reviewed       Lab Results   Component Value Date    GLUCOSE 80 07/19/2019    CALCIUM 9.1 07/19/2019     07/19/2019    K 4.2 07/19/2019    CO2 29.0 07/19/2019     07/19/2019    BUN 14 07/19/2019    CREATININE 0.71 07/19/2019    BCR 19.7 07/19/2019    ANIONGAP 7.0 07/19/2019       Lab Results - Last 18 Months   Lab Units 04/12/23  1450   TSH uIU/mL 1.210       Results for orders placed during the hospital encounter of 07/17/19    Adult Transthoracic Echo Complete W/ Cont if Necessary Per Protocol    Interpretation Summary  · Estimated EF = 60%.  · Left ventricular wall thickness is consistent with hypertrophy. Sigmoid-shaped ventricular septum is present.  · Left ventricular diastolic dysfunction.  · Mild pulmonary hypertension is present  · Small patent foramen ovale present with right to left shunting indicated by color flow Doppler.  · There is no evidence of pericardial effusion.     Assessment:   Diagnoses and all orders for this visit:    1. Paroxysmal atrial fibrillation (Primary)    2. Pulmonary HTN    Other orders  -     ECG 12 Lead    PAF: Previous PVI ablation in 2018 at [facility], no further recurrence. She is anticoagulated with xarelto, stable renal function on last labs.     Pulmonary HTN: Overall mild. Compliant with CPAP, denies any significant limitations or MACEDO. Travels frequently. Followed by pulmonology clinic and Dr. Cassidy. Has not really discussed possible RHC, but if RVSP is  not significantly high, may not be required.     Atrial tachycardia: noted on previous event monitor. Continue beta blocker. Overall, she denies any recent episodes. She may follow up with EP clinic in a year if she wishes, but she is also stable to follow PRN should she have any recurrence of arrhythmia that she would like to be addressed.  -She would like to continue following with Dr. Cassidy and she will return PRN to EP clinic.     I spent 30 minutes caring for Terra on this date of service. This time includes time spent by me in the following activities:preparing for the visit, reviewing tests, obtaining and/or reviewing a separately obtained history, performing a medically appropriate examination and/or evaluation , counseling and educating the patient/family/caregiver, ordering medications, tests, or procedures, referring and communicating with other health care professionals , and documenting information in the medical record        Electronically signed by AGUS Wright

## 2023-10-18 ENCOUNTER — OFFICE VISIT (OUTPATIENT)
Dept: OTOLARYNGOLOGY | Facility: CLINIC | Age: 73
End: 2023-10-18
Payer: MEDICARE

## 2023-10-18 VITALS
TEMPERATURE: 98.6 F | DIASTOLIC BLOOD PRESSURE: 78 MMHG | WEIGHT: 197 LBS | HEART RATE: 78 BPM | SYSTOLIC BLOOD PRESSURE: 127 MMHG | BODY MASS INDEX: 33.8 KG/M2

## 2023-10-18 DIAGNOSIS — E04.2 MULTINODULAR GOITER: Primary | ICD-10-CM

## 2023-10-18 DIAGNOSIS — H61.23 IMPACTED CERUMEN OF BOTH EARS: ICD-10-CM

## 2023-10-18 DIAGNOSIS — K21.9 LARYNGOPHARYNGEAL REFLUX: ICD-10-CM

## 2023-10-18 NOTE — PROGRESS NOTES
YOB: 1950  Location: [facility]  Location Address: [address] [place] [address], [facility], [address] [place] [address]  Location Phone: [phone]    No chief complaint on file.      History of Present Illness  Terra Garcia is a 73 y.o. female.  Terra Garcia is here for follow up of ENT complaints. The patient has had problems with thyroid nodules and reflux   Nodules have been present for the past several years and dominant nodule underwent fine needle aspiration 2022 with benign pathology   She is using reflux medication twcie daily and feels as if symptoms are relatively well controlled  She denies sore throat, hoarseness, or dysphagia         TSH (2023 14:50)   T3 (2023 14:50)    T4 (2023 14:50)   Fine Needle Aspiration (2022 10:43)   US Thyroid (10/18/2023 13:40)     Past Medical History:   Diagnosis Date    Abnormal ECG     Allergic rhinitis     Arrhythmia     Arthritis     Atrial fibrillation     Chronic sinusitis     Diabetes mellitus     Dizziness     GERD (gastroesophageal reflux disease)     Hyperlipidemia     Mixed    Hypertension     Hypertrophy of inferior nasal turbinate     Imbalance     Palpitations     Pulmonary HTN 2023    Sleep apnea, obstructive     Thyroid nodule     Tinnitus     Tremor        Past Surgical History:   Procedure Laterality Date    ABLATION OF DYSRHYTHMIC FOCUS      CARDIAC ABLATION      FINGER SURGERY      TONSILLECTOMY      TUBAL ABDOMINAL LIGATION         Outpatient Medications Marked as Taking for the 10/18/23 encounter (Office Visit) with Mary Ibarra APRN   Medication Sig Dispense Refill    atorvastatin (LIPITOR) 20 MG tablet Take 1 tablet by mouth Every Night.      Calcium Carb-Cholecalciferol 600-20 MG-MCG tablet       CONTOUR NEXT TEST test strip USE AS DIRECTED TO check blood sugar daily  5    estradiol (VAGIFEM) 10 MCG tablet vaginal tablet Insert 1 tablet into the vagina 2 (Two) Times a Week.      fluticasone  (FLONASE) 50 MCG/ACT nasal spray 1 spray into the nostril(s) as directed by provider Daily As Needed for Allergies.      lisinopril (PRINIVIL,ZESTRIL) 10 MG tablet Take 1 tablet by mouth Daily. 90 tablet 4    loratadine (CLARITIN) 10 MG tablet Take 1 tablet by mouth Daily.      magnesium oxide (MAG-OX) 400 MG tablet Take 1 tablet by mouth Daily.      metFORMIN (GLUCOPHAGE) 500 MG tablet       metoprolol succinate XL (TOPROL-XL) 25 MG 24 hr tablet Take 1 tablet by mouth Every 12 (Twelve) Hours.      Multiple Vitamins-Minerals (CENTRUM SILVER ULTRA WOMENS) tablet Take 1 tablet by mouth Daily.      Omega-3 Fatty Acids (FISH OIL) 1000 MG capsule capsule Take 1 capsule by mouth 2 (Two) Times a Day.      omeprazole (priLOSEC) 40 MG capsule TAKE ONE CAPSULE BY MOUTH TWICE DAILY FOR 30 DAYS 60 capsule 2    rivaroxaban (XARELTO) 20 MG tablet Take 1 tablet by mouth Daily With Dinner. 30 tablet 0    TRUEPLUS LANCETS 33G misc USE WITH GLUCOMETER TESTING ONCE DAILY  5    vitamin B-12 (CYANOCOBALAMIN) 1000 MCG tablet Take 1 tablet by mouth Daily.       Current Facility-Administered Medications for the 10/18/23 encounter (Office Visit) with Mary Ibarra APRN   Medication Dose Route Frequency Provider Last Rate Last Admin    lidocaine (XYLOCAINE) 1 % injection 5 mL  5 mL Subcutaneous Once Thuy Monreal MD           Terramycin [oxytetracycline]    Family History   Problem Relation Age of Onset    Heart disease Mother     Heart attack Mother     Hypertension Mother     Migraines Mother     Atrial fibrillation Father     Cancer Father     Hypertension Father     Arrhythmia Father         A fib       Social History     Socioeconomic History    Marital status:    Tobacco Use    Smoking status: Never    Smokeless tobacco: Never   Vaping Use    Vaping Use: Never used   Substance and Sexual Activity    Alcohol use: Not Currently     Comment: rare    Drug use: Never    Sexual activity: Not Currently     Partners: Male      Birth control/protection: Surgical, Tubal ligation     Comment: postmenopausal       Review of Systems   Constitutional: Negative.    HENT:  Negative for sore throat, trouble swallowing and voice change.        Vitals:    10/18/23 1423   BP: 127/78   Pulse: 78   Temp: 98.6 °F (37 °C)       Body mass index is 33.8 kg/m².    Objective     Physical Exam  Vitals reviewed.   Constitutional:       Appearance: She is obese.   HENT:      Head: Normocephalic.      Right Ear: There is impacted cerumen.      Left Ear: There is impacted cerumen.      Nose: Nose normal.      Mouth/Throat:      Lips: Pink.      Mouth: Mucous membranes are moist.      Pharynx: Uvula midline.   Neck:      Comments: Bilateral palpable thyroid nodules     Neurological:      Mental Status: She is alert.       Ear Cerumen Removal    Date/Time: 10/18/2023 3:29 PM    Performed by: Mary Ibarra APRN  Authorized by: Mary Ibarra APRN  Consent: Verbal consent obtained.  Consent given by: patient  Patient understanding: patient states understanding of the procedure being performed  Patient identity confirmed: verbally with patient    Anesthesia:  Local Anesthetic: none  Location details: left ear and right ear  Patient tolerance: patient tolerated the procedure well with no immediate complications  Comments: Cerumen removed without difficulty     Procedure type: instrumentation, curette         Assessment & Plan   Diagnoses and all orders for this visit:    1. Multinodular goiter (Primary)  -     US Thyroid; Future    2. Laryngopharyngeal reflux    3. Impacted cerumen of both ears    Other orders  -     Ear Cerumen Removal      * Surgery not found *  Orders Placed This Encounter   Procedures    Ear Cerumen Removal     This order was created via procedure documentation     Order Specific Question:   Release to patient     Answer:   Routine Release [[id number]]    US Thyroid     Standing Status:   Future     Standing Expiration Date:   10/18/2024      Order Specific Question:   Reason for Exam:     Answer:   Thyroid disease     Order Specific Question:   Release to patient     Answer:   Routine Release [[id number]]     No follow-ups on file.     F/u with repeat ultrasound     Patient Instructions   The patient has a thyroid nodule, which is relatively small, and studies do not suggest a malignancy. I have recommended observation with follow-up with me for repeat ultrasound. I explained the pathology of thyroid nodules including the risks of cancer. The options of surgery were discussed, but the patient wants to pursue an observational course for now, which is reasonable. The patient wishes to continue to defer surgery at this time.

## 2023-10-26 ENCOUNTER — TELEPHONE (OUTPATIENT)
Dept: PULMONOLOGY | Facility: CLINIC | Age: 73
End: 2023-10-26
Payer: MEDICARE

## 2023-10-26 DIAGNOSIS — Z29.11 NEED FOR PROPHYLACTIC VACCINATION AND INOCULATION AGAINST RESPIRATORY SYNCYTIAL VIRUS (RSV): Primary | ICD-10-CM

## 2023-10-26 NOTE — TELEPHONE ENCOUNTER
Patient called to say [facility] in [place] needs an order for the RSV vaccine.  Please approve and then I will fax it to pharmacy of patient's choice.    Rx Refill Note  Requested Prescriptions     Pending Prescriptions Disp Refills    RSVPreF3 Vac Recomb Adjuvanted (AREXVY) 120 MCG/0.5ML reconstituted suspension injection 0.5 mL 0     Sig: Inject 0.5 mL into the appropriate muscle as directed by prescriber 1 (One) Time for 1 dose.      Last office visit with prescribing clinician: 7/27/2023   Last telemedicine visit with prescribing clinician: Visit date not found   Next office visit with prescribing clinician: 1/30/2024                         Would you like a call back once the refill request has been completed: [] Yes [] No    If the office needs to give you a call back, can they leave a voicemail: [] Yes [] No    Lottie Marin CMA  10/26/23, 15:02 CDT

## 2023-11-16 RX ORDER — OMEPRAZOLE 40 MG/1
CAPSULE, DELAYED RELEASE ORAL
Qty: 60 CAPSULE | Refills: 2 | Status: SHIPPED | OUTPATIENT
Start: 2023-11-16

## 2024-02-15 RX ORDER — OMEPRAZOLE 40 MG/1
CAPSULE, DELAYED RELEASE ORAL
Qty: 60 CAPSULE | Refills: 2 | Status: SHIPPED | OUTPATIENT
Start: 2024-02-15

## 2024-04-15 ENCOUNTER — OFFICE VISIT (OUTPATIENT)
Dept: OTOLARYNGOLOGY | Facility: CLINIC | Age: 74
End: 2024-04-15
Payer: MEDICARE

## 2024-04-15 VITALS
BODY MASS INDEX: 33.29 KG/M2 | TEMPERATURE: 97.8 F | DIASTOLIC BLOOD PRESSURE: 84 MMHG | SYSTOLIC BLOOD PRESSURE: 146 MMHG | HEIGHT: 64 IN | WEIGHT: 195 LBS | HEART RATE: 116 BPM

## 2024-04-15 DIAGNOSIS — H61.23 IMPACTED CERUMEN OF BOTH EARS: ICD-10-CM

## 2024-04-15 DIAGNOSIS — E04.2 MULTINODULAR GOITER: Primary | ICD-10-CM

## 2024-04-15 DIAGNOSIS — K21.9 LARYNGOPHARYNGEAL REFLUX: ICD-10-CM

## 2024-04-15 DIAGNOSIS — J32.9 RECURRENT SINUSITIS: ICD-10-CM

## 2024-04-15 PROCEDURE — 69210 REMOVE IMPACTED EAR WAX UNI: CPT | Performed by: NURSE PRACTITIONER

## 2024-04-15 PROCEDURE — 1160F RVW MEDS BY RX/DR IN RCRD: CPT | Performed by: NURSE PRACTITIONER

## 2024-04-15 PROCEDURE — 1159F MED LIST DOCD IN RCRD: CPT | Performed by: NURSE PRACTITIONER

## 2024-04-15 PROCEDURE — 3079F DIAST BP 80-89 MM HG: CPT | Performed by: NURSE PRACTITIONER

## 2024-04-15 PROCEDURE — 3077F SYST BP >= 140 MM HG: CPT | Performed by: NURSE PRACTITIONER

## 2024-04-15 PROCEDURE — 99213 OFFICE O/P EST LOW 20 MIN: CPT | Performed by: NURSE PRACTITIONER

## 2024-04-15 RX ORDER — AMOXICILLIN 500 MG/1
500 CAPSULE ORAL 3 TIMES DAILY
Qty: 63 CAPSULE | Refills: 0 | Status: SHIPPED | OUTPATIENT
Start: 2024-04-15 | End: 2024-05-06

## 2024-04-15 RX ORDER — AZELASTINE 1 MG/ML
2 SPRAY, METERED NASAL 2 TIMES DAILY
Qty: 30 ML | Refills: 11 | Status: SHIPPED | OUTPATIENT
Start: 2024-04-15

## 2024-04-15 NOTE — PROGRESS NOTES
YOB: 1950  Location: [facility]  Location Address: [address] [place] [address], [facility] [address], [address] [place] [address]  Location Phone: [phone]    Chief Complaint   Patient presents with    Thyroid Problem    Sinus Problem     Sinus infection 3 rounds of abx and oral steriods since January when she had covid        History of Present Illness  Terra Garcia is a 73 y.o. female.  Terra Garcia is here for follow up of ENT complaints. The patient has had problems with thyroid nodules and reflux  Nodules have been present for the past several years and dominant nodule underwent fine needle aspiration 2022 with benign pathology   She is using reflux medications twice daily and feels as if symptoms have been relatively stable     Patient does report having covid in January and 3 - 4 sinus infections since that time. She has been on multiple courses of antibiotics since that time. She is using flonase and has been taking claritin daily       US Thyroid (04/15/2024 14:01)      TSH (2024 13:00 EST)   Fine Needle Aspiration (2022 10:43)   Past Medical History:   Diagnosis Date    Abnormal ECG     Allergic rhinitis     Arrhythmia     Arthritis     Atrial fibrillation     Chronic sinusitis     Diabetes mellitus     Dizziness     GERD (gastroesophageal reflux disease)     Hyperlipidemia     Mixed    Hypertension     Hypertrophy of inferior nasal turbinate     Imbalance     Palpitations     Pulmonary HTN 2023    Sleep apnea, obstructive     Thyroid nodule     Tinnitus     Tremor        Past Surgical History:   Procedure Laterality Date    ABLATION OF DYSRHYTHMIC FOCUS      CARDIAC ABLATION      FINGER SURGERY      TONSILLECTOMY      TUBAL ABDOMINAL LIGATION         Outpatient Medications Marked as Taking for the 4/15/24 encounter (Office Visit) with Mary Ibarra APRN   Medication Sig Dispense Refill    atorvastatin (LIPITOR) 20 MG tablet Take 1 tablet by mouth Every Night.      Calcium  Carb-Cholecalciferol 600-20 MG-MCG tablet       CONTOUR NEXT TEST test strip USE AS DIRECTED TO check blood sugar daily  5    estradiol (VAGIFEM) 10 MCG tablet vaginal tablet Insert 1 tablet into the vagina 2 (Two) Times a Week.      fluticasone (FLONASE) 50 MCG/ACT nasal spray 1 spray into the nostril(s) as directed by provider Daily As Needed for Allergies.      lisinopril (PRINIVIL,ZESTRIL) 10 MG tablet Take 1 tablet by mouth Daily. 90 tablet 4    loratadine (CLARITIN) 10 MG tablet Take 1 tablet by mouth Daily.      magnesium oxide (MAG-OX) 400 MG tablet Take 1 tablet by mouth Daily.      metFORMIN (GLUCOPHAGE) 500 MG tablet       metoprolol succinate XL (TOPROL-XL) 25 MG 24 hr tablet Take 1 tablet by mouth Every 12 (Twelve) Hours.      Multiple Vitamins-Minerals (CENTRUM SILVER ULTRA WOMENS) tablet Take 1 tablet by mouth Daily.      Omega-3 Fatty Acids (FISH OIL) 1000 MG capsule capsule Take 1 capsule by mouth 2 (Two) Times a Day.      omeprazole (priLOSEC) 40 MG capsule TAKE ONE CAPSULE BY MOUTH TWICE DAILY FOR 30 DAYS 60 capsule 2    rivaroxaban (XARELTO) 20 MG tablet Take 1 tablet by mouth Daily With Dinner. 30 tablet 0    TRUEPLUS LANCETS 33G misc USE WITH GLUCOMETER TESTING ONCE DAILY  5    vitamin B-12 (CYANOCOBALAMIN) 1000 MCG tablet Take 1 tablet by mouth Daily.       Current Facility-Administered Medications for the 4/15/24 encounter (Office Visit) with Mary Ibarra APRN   Medication Dose Route Frequency Provider Last Rate Last Admin    lidocaine (XYLOCAINE) 1 % injection 5 mL  5 mL Subcutaneous Once Thuy Monreal MD           Terramycin [oxytetracycline]    Family History   Problem Relation Age of Onset    Heart disease Mother     Heart attack Mother     Hypertension Mother     Migraines Mother     Atrial fibrillation Father     Cancer Father     Hypertension Father     Arrhythmia Father         A fib       Social History     Socioeconomic History    Marital status:    Tobacco Use     Smoking status: Never    Smokeless tobacco: Never   Vaping Use    Vaping status: Never Used   Substance and Sexual Activity    Alcohol use: Not Currently     Comment: rare    Drug use: Never    Sexual activity: Not Currently     Partners: Male     Birth control/protection: Surgical, Tubal ligation     Comment: postmenopausal       Review of Systems   Constitutional: Negative.    HENT:  Positive for congestion and sinus pressure.        Vitals:    04/15/24 1402   BP: 146/84   Pulse: 116   Temp: 97.8 °F (36.6 °C)       Body mass index is 33.45 kg/m².    Objective     Physical Exam  Vitals reviewed.   Constitutional:       Appearance: She is obese.   HENT:      Head: Normocephalic.      Right Ear: There is impacted cerumen.      Left Ear: There is impacted cerumen.      Nose: Nose normal.      Mouth/Throat:      Lips: Pink.      Mouth: Mucous membranes are moist.      Pharynx: Uvula midline.   Neck:      Comments: Bilateral palpable thyroid nodules     Neurological:      Mental Status: She is alert.       Ear Cerumen Removal    Date/Time: 4/15/2024 2:20 PM    Performed by: Mary Ibarra APRN  Authorized by: Mary Ibarra APRN  Consent: Verbal consent obtained.  Consent given by: patient  Patient identity confirmed: verbally with patient    Anesthesia:  Local Anesthetic: none  Location details: left ear and right ear  Patient tolerance: patient tolerated the procedure well with no immediate complications  Procedure type: instrumentation, curette           Assessment & Plan   Diagnoses and all orders for this visit:    1. Multinodular goiter (Primary)  -     US Thyroid; Future    2. Laryngopharyngeal reflux    3. Impacted cerumen of both ears    4. Recurrent sinusitis    Other orders  -     amoxicillin (AMOXIL) 500 MG capsule; Take 1 capsule by mouth 3 (Three) Times a Day for 21 days.  Dispense: 63 capsule; Refill: 0  -     azelastine (ASTELIN) 0.1 % nasal spray; 2 sprays into the nostril(s) as directed by provider 2  (Two) Times a Day. Use in each nostril as directed  Dispense: 30 mL; Refill: 11  -     Ear Cerumen Removal      * Surgery not found *  Orders Placed This Encounter   Procedures    Ear Cerumen Removal     This order was created via procedure documentation     Order Specific Question:   Release to patient     Answer:   Routine Release [[id number]]    US Thyroid     Standing Status:   Future     Standing Expiration Date:   4/15/2025     Order Specific Question:   Reason for Exam:     Answer:   Thyroid disease     Order Specific Question:   Release to patient     Answer:   Routine Release [[id number]]     No follow-ups on file.     Complete course of antibiotics   Would like resolution of symptoms plus 7 days   Add astelin   Continue flonase   Recheck thyroid nodules in one year      Patient Instructions   For the best response, use your nasal sprays every day without skipping doses. It may take several weeks before the full effect is acheived.  The patient has a thyroid nodule, which is relatively small, and studies do not suggest a malignancy. I have recommended observation with follow-up with me for repeat ultrasound. I explained the pathology of thyroid nodules including the risks of cancer.

## 2024-04-15 NOTE — PATIENT INSTRUCTIONS
For the best response, use your nasal sprays every day without skipping doses. It may take several weeks before the full effect is acheived.  The patient has a thyroid nodule, which is relatively small, and studies do not suggest a malignancy. I have recommended observation with follow-up with me for repeat ultrasound. I explained the pathology of thyroid nodules including the risks of cancer.

## 2024-05-10 RX ORDER — OMEPRAZOLE 40 MG/1
CAPSULE, DELAYED RELEASE ORAL
Qty: 60 CAPSULE | Refills: 2 | Status: SHIPPED | OUTPATIENT
Start: 2024-05-10

## 2024-07-08 ENCOUNTER — OFFICE VISIT (OUTPATIENT)
Dept: OTOLARYNGOLOGY | Facility: CLINIC | Age: 74
End: 2024-07-08
Payer: MEDICARE

## 2024-07-08 VITALS — DIASTOLIC BLOOD PRESSURE: 95 MMHG | SYSTOLIC BLOOD PRESSURE: 151 MMHG | TEMPERATURE: 98 F | HEART RATE: 76 BPM

## 2024-07-08 DIAGNOSIS — K21.9 LARYNGOPHARYNGEAL REFLUX: ICD-10-CM

## 2024-07-08 DIAGNOSIS — J32.9 RECURRENT SINUSITIS: Primary | ICD-10-CM

## 2024-07-08 PROCEDURE — 1160F RVW MEDS BY RX/DR IN RCRD: CPT | Performed by: NURSE PRACTITIONER

## 2024-07-08 PROCEDURE — 3077F SYST BP >= 140 MM HG: CPT | Performed by: NURSE PRACTITIONER

## 2024-07-08 PROCEDURE — 99213 OFFICE O/P EST LOW 20 MIN: CPT | Performed by: NURSE PRACTITIONER

## 2024-07-08 PROCEDURE — 1159F MED LIST DOCD IN RCRD: CPT | Performed by: NURSE PRACTITIONER

## 2024-07-08 PROCEDURE — 3080F DIAST BP >= 90 MM HG: CPT | Performed by: NURSE PRACTITIONER

## 2024-07-08 RX ORDER — CYCLOBENZAPRINE HCL 10 MG
10 TABLET ORAL
COMMUNITY
Start: 2024-07-01

## 2024-07-08 RX ORDER — HYDROCODONE BITARTRATE AND ACETAMINOPHEN 10; 325 MG/1; MG/1
1 TABLET ORAL EVERY 6 HOURS PRN
COMMUNITY
Start: 2024-07-01

## 2024-07-08 NOTE — PROGRESS NOTES
YOB: 1950  Location: [facility]  Location Address: [address] [place] [address], [facility], [address] [place] [address]  Location Phone: [phone]    Chief Complaint   Patient presents with    Sinus Problem     Pt states she is doing better        History of Present Illness  Terra Garcia is a 73 y.o. female.  Terra Garcia is here for follow up of ENT complaints. The patient has had problems with sinus infections and ongoing nasal congestion   Symptoms started shortly after having covid    She completed a 3 week course of antibiotics and feels as if symptoms have improved at this time   Patient is using nasal sprays daily       Past Medical History:   Diagnosis Date    Abnormal ECG     Allergic rhinitis     Arrhythmia     Arthritis     Atrial fibrillation     Chronic sinusitis     Diabetes mellitus     Dizziness     GERD (gastroesophageal reflux disease)     Hyperlipidemia     Mixed    Hypertension     Hypertrophy of inferior nasal turbinate     Imbalance     Palpitations     Pulmonary HTN 2023    Sleep apnea, obstructive     Thyroid nodule     Tinnitus     Tremor        Past Surgical History:   Procedure Laterality Date    ABLATION OF DYSRHYTHMIC FOCUS      CARDIAC ABLATION      FINGER SURGERY      TONSILLECTOMY      TUBAL ABDOMINAL LIGATION         Outpatient Medications Marked as Taking for the 24 encounter (Office Visit) with Mary Ibarra APRN   Medication Sig Dispense Refill    atorvastatin (LIPITOR) 20 MG tablet Take 1 tablet by mouth Every Night.      azelastine (ASTELIN) 0.1 % nasal spray 2 sprays into the nostril(s) as directed by provider 2 (Two) Times a Day. Use in each nostril as directed 30 mL 11    Calcium Carb-Cholecalciferol 600-20 MG-MCG tablet       CONTOUR NEXT TEST test strip USE AS DIRECTED TO check blood sugar daily  5    cyclobenzaprine (FLEXERIL) 10 MG tablet Take 1 tablet by mouth.      estradiol (VAGIFEM) 10 MCG tablet vaginal tablet Insert 1 tablet into the  vagina 2 (Two) Times a Week.      fluticasone (FLONASE) 50 MCG/ACT nasal spray 1 spray into the nostril(s) as directed by provider Daily As Needed for Allergies.      HYDROcodone-acetaminophen (NORCO)  MG per tablet Take 1 tablet by mouth Every 6 (Six) Hours As Needed.      lisinopril (PRINIVIL,ZESTRIL) 10 MG tablet Take 1 tablet by mouth Daily. 90 tablet 4    loratadine (CLARITIN) 10 MG tablet Take 1 tablet by mouth Daily.      magnesium oxide (MAG-OX) 400 MG tablet Take 1 tablet by mouth Daily.      metFORMIN (GLUCOPHAGE) 500 MG tablet       metoprolol succinate XL (TOPROL-XL) 25 MG 24 hr tablet Take 1 tablet by mouth Every 12 (Twelve) Hours.      Multiple Vitamins-Minerals (CENTRUM SILVER ULTRA WOMENS) tablet Take 1 tablet by mouth Daily.      Omega-3 Fatty Acids (FISH OIL) 1000 MG capsule capsule Take 1 capsule by mouth 2 (Two) Times a Day.      omeprazole (priLOSEC) 40 MG capsule TAKE ONE CAPSULE BY MOUTH TWICE DAILY FOR 30 DAYS 60 capsule 2    rivaroxaban (XARELTO) 20 MG tablet Take 1 tablet by mouth Daily With Dinner. 30 tablet 0    TRUEPLUS LANCETS 33G misc USE WITH GLUCOMETER TESTING ONCE DAILY  5    vitamin B-12 (CYANOCOBALAMIN) 1000 MCG tablet Take 1 tablet by mouth Daily.       Current Facility-Administered Medications for the 7/8/24 encounter (Office Visit) with Mary Ibarra APRN   Medication Dose Route Frequency Provider Last Rate Last Admin    lidocaine (XYLOCAINE) 1 % injection 5 mL  5 mL Subcutaneous Once Thuy Monreal MD           Terramycin [oxytetracycline]    Family History   Problem Relation Age of Onset    Heart disease Mother     Heart attack Mother     Hypertension Mother     Migraines Mother     Atrial fibrillation Father     Cancer Father     Hypertension Father     Arrhythmia Father         A fib       Social History     Socioeconomic History    Marital status:    Tobacco Use    Smoking status: Never    Smokeless tobacco: Never   Vaping Use    Vaping status: Never  Used   Substance and Sexual Activity    Alcohol use: Not Currently     Comment: rare    Drug use: Never    Sexual activity: Not Currently     Partners: Male     Birth control/protection: Surgical, Tubal ligation     Comment: postmenopausal       Review of Systems   Constitutional: Negative.    HENT: Negative.         Vitals:    07/08/24 1315   BP: 151/95   Pulse: 76   Temp: 98 °F (36.7 °C)       There is no height or weight on file to calculate BMI.    Objective     Physical Exam  Vitals reviewed.   HENT:      Head: Normocephalic.      Right Ear: Tympanic membrane, ear canal and external ear normal.      Left Ear: Tympanic membrane, ear canal and external ear normal.      Nose: Nose normal.      Mouth/Throat:      Lips: Pink.      Mouth: Mucous membranes are moist.   Neurological:      Mental Status: She is alert.         Assessment & Plan   Diagnoses and all orders for this visit:    1. Recurrent sinusitis (Primary)    2. Laryngopharyngeal reflux      * Surgery not found *  No orders of the defined types were placed in this encounter.    Return in about 3 months (around 10/8/2024) for Recheck.     Continue nasal sprays   Return in October for thyroid check     Patient Instructions   For the best response, use your nasal sprays every day without skipping doses. It may take several weeks before the full effect is acheived.

## 2024-09-03 ENCOUNTER — OFFICE VISIT (OUTPATIENT)
Dept: PULMONOLOGY | Facility: CLINIC | Age: 74
End: 2024-09-03
Payer: MEDICARE

## 2024-09-03 VITALS
OXYGEN SATURATION: 96 % | HEIGHT: 64 IN | SYSTOLIC BLOOD PRESSURE: 136 MMHG | BODY MASS INDEX: 31.92 KG/M2 | DIASTOLIC BLOOD PRESSURE: 86 MMHG | HEART RATE: 86 BPM | WEIGHT: 187 LBS

## 2024-09-03 DIAGNOSIS — I27.20 PULMONARY HTN: Primary | ICD-10-CM

## 2024-09-03 DIAGNOSIS — I51.7 LVH (LEFT VENTRICULAR HYPERTROPHY): ICD-10-CM

## 2024-09-03 DIAGNOSIS — I48.0 PAROXYSMAL ATRIAL FIBRILLATION: ICD-10-CM

## 2024-09-03 DIAGNOSIS — G47.33 OSA ON CPAP: ICD-10-CM

## 2024-09-03 PROCEDURE — 99214 OFFICE O/P EST MOD 30 MIN: CPT | Performed by: INTERNAL MEDICINE

## 2024-09-03 PROCEDURE — 3075F SYST BP GE 130 - 139MM HG: CPT | Performed by: INTERNAL MEDICINE

## 2024-09-03 PROCEDURE — 3079F DIAST BP 80-89 MM HG: CPT | Performed by: INTERNAL MEDICINE

## 2024-09-03 NOTE — PROGRESS NOTES
Background:  pt w pulm htn, hx ethan, paroxysmal afib. LVH   Chief Complaint  Pulmonary hypertension    Subjective    History of Present Illness     Terra Garcia is here for follow up with [facility].  History of Present Illness  She had problems with symptomatic svt.  She continues on metoprolol.  She is not sure whether a follow up is scheduled or not.  No significant chest pain. Not short of breath.  No leg swelling.  She has bursitis in the left ankle which swells sometimes.  She had Covid earlier this year, ended up getting several rounds of meds including steroids, also got treated empirically for tickborne illness.  She has some nerve compression evaluated by neurosurgeon.     Tobacco Use: Low Risk  (9/3/2024)    Patient History     Smoking Tobacco Use: Never     Smokeless Tobacco Use: Never     Passive Exposure: Not on file      Current Outpatient Medications   Medication Instructions    atorvastatin (LIPITOR) 20 mg, Oral, Nightly    azelastine (ASTELIN) 0.1 % nasal spray 2 sprays, Nasal, 2 Times Daily, Use in each nostril as directed    Calcium Carb-Cholecalciferol 600-20 MG-MCG tablet No dose, route, or frequency recorded.    CONTOUR NEXT TEST test strip USE AS DIRECTED TO check blood sugar daily    estradiol (VAGIFEM) 10 mcg, Vaginal, 2 Times Weekly    fish oil 1,000 mg, Oral, 2 Times Daily    fluticasone (FLONASE) 50 MCG/ACT nasal spray 1 spray, Nasal, Daily PRN    HYDROcodone-acetaminophen (NORCO)  MG per tablet 1 tablet, Oral, Every 6 Hours PRN    lisinopril (PRINIVIL,ZESTRIL) 10 mg, Oral, Daily    loratadine (CLARITIN) 10 mg, Oral, Daily    magnesium oxide (MAG-OX) 400 mg, Oral, Daily    metFORMIN (GLUCOPHAGE) 500 MG tablet No dose, route, or frequency recorded.    metoprolol succinate XL (TOPROL-XL) 25 MG 24 hr tablet 1 tablet, Oral, Every 12 Hours Scheduled    Multiple Vitamins-Minerals (CENTRUM SILVER ULTRA WOMENS) tablet 1 tablet,  "Oral, Daily    omeprazole (priLOSEC) 40 MG capsule TAKE ONE CAPSULE BY MOUTH TWICE DAILY FOR 30 DAYS    rivaroxaban (XARELTO) 20 mg, Oral, Daily With Dinner    TRUEPLUS LANCETS 33G misc USE WITH GLUCOMETER TESTING ONCE DAILY    vitamin B-12 (CYANOCOBALAMIN) 1,000 mcg, Oral, Daily      Objective     Vital Signs:   /86   Pulse 86   Ht 162.6 cm (64\")   Wt 84.8 kg (187 lb)   SpO2 96% Comment: COTY  BMI 32.10 kg/m²   Physical Exam  Constitutional:       General: She is not in acute distress.     Appearance: She is well-developed. She is not ill-appearing or toxic-appearing.   HENT:      Head: Atraumatic.   Eyes:      General: No scleral icterus.     Conjunctiva/sclera: Conjunctivae normal.   Cardiovascular:      Rate and Rhythm: Normal rate and regular rhythm.      Heart sounds: S1 normal and S2 normal.      Comments: P2 normal  Pulmonary:      Effort: Pulmonary effort is normal.      Breath sounds: Normal breath sounds.   Abdominal:      General: There is no distension.   Musculoskeletal:         General: No deformity.      Cervical back: Neck supple.   Skin:     Coloration: Skin is not pale.      Findings: No rash.   Neurological:      Mental Status: She is alert.        Result Review  Data Reviewed:  SCANNED - CARDIOLOGY (07/24/2023 00:00)    LVH, ef 55-60, RA nl, LA nl RV nl, mild tr, \"mild PH\" reported  Rvsp pixelated on report                   Assessment and Plan    Diagnoses and all orders for this visit:    1. Pulmonary HTN (Primary)  -     Adult Transthoracic Echo Limited W/ Cont if Necessary Per Protocol; Future    2. MARGO on CPAP    3. Paroxysmal atrial fibrillation    4. LVH (left ventricular hypertrophy)    We will need to repeat the echo since it has been a year since the last one.  At [facility] where she lives.  Exam and history seem favorable  Will request another copy of last year's echo report  Multiple potentially contributing factors for pulmonary hypertension include cardiac disease with LVH " possible diastolic dysfunction and arrhythmia, sleep apnea although this is treated.    Follow Up   Return in about 1 year (around 9/3/2025).  Patient was given instructions and counseling regarding her condition or for health maintenance advice. Please see specific information pulled into the AVS if appropriate.    Electronically signed by Franck Quintero MD, 9/3/2024, 19:49 CDT

## 2024-09-05 ENCOUNTER — TELEPHONE (OUTPATIENT)
Dept: PULMONOLOGY | Facility: CLINIC | Age: 74
End: 2024-09-05
Payer: MEDICARE

## 2024-09-05 NOTE — TELEPHONE ENCOUNTER
----- Message from Gema GODINEZ sent at 9/3/2024 10:39 AM CDT -----  Have [facility] send another copy of report from [place] from 7/24/23 which is not as pixelated.   Dr Quintero put this on check out notes. I don't mind doing it but I know he also tells you to do the same things sometimes. Are you already doing this or do I need to do it?

## 2024-09-09 ENCOUNTER — TELEPHONE (OUTPATIENT)
Dept: PULMONOLOGY | Facility: CLINIC | Age: 74
End: 2024-09-09
Payer: MEDICARE

## 2024-09-09 DIAGNOSIS — I27.20 PULMONARY HTN: Primary | ICD-10-CM

## 2024-09-09 NOTE — TELEPHONE ENCOUNTER
They are wanting the patient to have a complete ECHO instead of a limited ECHO since it has been over one year since she had one done.  Okay with you? Please put in a new order.  Patient is scheduled at 10 am today.

## 2024-09-10 ENCOUNTER — TELEPHONE (OUTPATIENT)
Dept: PULMONOLOGY | Facility: CLINIC | Age: 74
End: 2024-09-10
Payer: MEDICARE

## 2024-09-10 NOTE — TELEPHONE ENCOUNTER
----- Message from Franck Quintero sent at 9/9/2024  7:34 PM CDT -----  Let pt know this showed borderline results.  Hard to conclude much from it considering she does not have symptoms (shortness of breath, increasing leg edema, chest pain).  Will need to continue watching closely.  If she gets any of these above symptoms, let us know and we can see her back sooner.  ----- Message -----  From: Lottie Marin CMA  Sent: 9/5/2024   4:42 PM CDT  To: Franck Quintero MD

## 2024-09-12 DIAGNOSIS — I27.20 PULMONARY HTN: ICD-10-CM

## 2024-10-14 ENCOUNTER — OFFICE VISIT (OUTPATIENT)
Dept: OTOLARYNGOLOGY | Facility: CLINIC | Age: 74
End: 2024-10-14
Payer: MEDICARE

## 2024-10-14 VITALS
HEIGHT: 64 IN | BODY MASS INDEX: 32.95 KG/M2 | DIASTOLIC BLOOD PRESSURE: 82 MMHG | HEART RATE: 56 BPM | TEMPERATURE: 97.7 F | WEIGHT: 193 LBS | SYSTOLIC BLOOD PRESSURE: 102 MMHG

## 2024-10-14 DIAGNOSIS — E04.2 MULTINODULAR GOITER: Primary | ICD-10-CM

## 2024-10-14 PROCEDURE — 3074F SYST BP LT 130 MM HG: CPT | Performed by: NURSE PRACTITIONER

## 2024-10-14 PROCEDURE — 3079F DIAST BP 80-89 MM HG: CPT | Performed by: NURSE PRACTITIONER

## 2024-10-14 PROCEDURE — 99213 OFFICE O/P EST LOW 20 MIN: CPT | Performed by: NURSE PRACTITIONER

## 2024-10-14 NOTE — PROGRESS NOTES
YOB: 1950  Location: [facility]  Location Address: [address] [place] [address],  [address], [address] [place] [address]  Location Phone: [phone]    Chief Complaint   Patient presents with    Thyroid Problem       History of Present Illness  Terra Garcia is a 74 y.o. female.  Terra Garcia is here for follow up of ENT complaints. The patient has had problems with thyroid nodules  Nodules have been present for the past several years dominant nodule underwent fine-needle aspiration 2022 with benign pathology   Patient has been using nasal sprays for her sinus symptoms and reports improvement    US Thyroid (10/14/2024 13:14)    TSH (2024 14:41 EDT)   Past Medical History:   Diagnosis Date    Abnormal ECG     Allergic rhinitis     Arrhythmia     Arthritis     Atrial fibrillation     Chronic sinusitis     Diabetes mellitus     Dizziness     GERD (gastroesophageal reflux disease)     Hyperlipidemia     Mixed    Hypertension     Hypertrophy of inferior nasal turbinate     Imbalance     Palpitations     Pulmonary HTN 2023    Sleep apnea, obstructive     Thyroid nodule     Tinnitus     Tremor        Past Surgical History:   Procedure Laterality Date    ABLATION OF DYSRHYTHMIC FOCUS      CARDIAC ABLATION      FINGER SURGERY      TONSILLECTOMY      TUBAL ABDOMINAL LIGATION         Outpatient Medications Marked as Taking for the 10/14/24 encounter (Office Visit) with Mary Ibarra APRN   Medication Sig Dispense Refill    atorvastatin (LIPITOR) 20 MG tablet Take 1 tablet by mouth Every Night.      azelastine (ASTELIN) 0.1 % nasal spray 2 sprays into the nostril(s) as directed by provider 2 (Two) Times a Day. Use in each nostril as directed 30 mL 11    Calcium Carb-Cholecalciferol 600-20 MG-MCG tablet       CONTOUR NEXT TEST test strip USE AS DIRECTED TO check blood sugar daily  5    estradiol (VAGIFEM) 10 MCG tablet vaginal tablet Insert 1 tablet into the vagina 2 (Two) Times a Week.       fluticasone (FLONASE) 50 MCG/ACT nasal spray Administer 1 spray into the nostril(s) as directed by provider Daily As Needed for Allergies.      HYDROcodone-acetaminophen (NORCO)  MG per tablet Take 1 tablet by mouth Every 6 (Six) Hours As Needed.      lisinopril (PRINIVIL,ZESTRIL) 10 MG tablet Take 1 tablet by mouth Daily. (Patient taking differently: Take 2 tablets by mouth Daily.) 90 tablet 4    loratadine (CLARITIN) 10 MG tablet Take 1 tablet by mouth Daily.      magnesium oxide (MAG-OX) 400 MG tablet Take 1 tablet by mouth Daily.      metFORMIN (GLUCOPHAGE) 500 MG tablet       metoprolol succinate XL (TOPROL-XL) 25 MG 24 hr tablet Take 1 tablet by mouth Every 12 (Twelve) Hours.      Multiple Vitamins-Minerals (CENTRUM SILVER ULTRA WOMENS) tablet Take 1 tablet by mouth Daily.      Omega-3 Fatty Acids (FISH OIL) 1000 MG capsule capsule Take 1 capsule by mouth 2 (Two) Times a Day.      omeprazole (priLOSEC) 40 MG capsule TAKE ONE CAPSULE BY MOUTH TWICE DAILY FOR 30 DAYS 60 capsule 2    rivaroxaban (XARELTO) 20 MG tablet Take 1 tablet by mouth Daily With Dinner. 30 tablet 0    TRUEPLUS LANCETS 33G misc USE WITH GLUCOMETER TESTING ONCE DAILY  5    vitamin B-12 (CYANOCOBALAMIN) 1000 MCG tablet Take 1 tablet by mouth Daily.       Current Facility-Administered Medications for the 10/14/24 encounter (Office Visit) with Mary Ibarra APRN   Medication Dose Route Frequency Provider Last Rate Last Admin    lidocaine (XYLOCAINE) 1 % injection 5 mL  5 mL Subcutaneous Once Thuy Monreal MD           Terramycin [oxytetracycline]    Family History   Problem Relation Age of Onset    Heart disease Mother     Heart attack Mother     Hypertension Mother     Migraines Mother     Atrial fibrillation Father     Cancer Father     Hypertension Father     Arrhythmia Father         A fib       Social History     Socioeconomic History    Marital status:    Tobacco Use    Smoking status: Never    Smokeless tobacco: Never    Vaping Use    Vaping status: Never Used   Substance and Sexual Activity    Alcohol use: Not Currently     Comment: rare    Drug use: Never    Sexual activity: Not Currently     Partners: Male     Birth control/protection: Surgical, Tubal ligation     Comment: postmenopausal       Review of Systems   Constitutional: Negative.    HENT: Negative.         Vitals:    10/14/24 1321   BP: 102/82   Pulse: 56   Temp: 97.7 °F (36.5 °C)       Body mass index is 33.13 kg/m².    Objective     Physical Exam  Vitals reviewed.   Constitutional:       Appearance: She is obese.   HENT:      Head: Normocephalic.      Right Ear: Tympanic membrane, ear canal and external ear normal.      Left Ear: Tympanic membrane, ear canal and external ear normal.      Nose: Nose normal.      Mouth/Throat:      Lips: Pink.      Mouth: Mucous membranes are moist.      Pharynx: Uvula midline.   Neck:      Comments: Bilateral palpable thyroid nodules     Musculoskeletal:      Cervical back: Full passive range of motion without pain.   Neurological:      Mental Status: She is alert.         Assessment & Plan   Diagnoses and all orders for this visit:    1. Multinodular goiter (Primary)  -     US Thyroid; Future      * Surgery not found *  Orders Placed This Encounter   Procedures    US Thyroid     Standing Status:   Future     Standing Expiration Date:   10/14/2025     Order Specific Question:   Reason for Exam:     Answer:   Thyroid disease     Order Specific Question:   Release to patient     Answer:   Routine Release [[id number]]     No follow-ups on file.     F/u in one year with repeat ultrasound   Call for new/worsening concerns     There are no Patient Instructions on file for this visit.

## 2025-06-24 RX ORDER — AZELASTINE HYDROCHLORIDE 137 UG/1
SPRAY, METERED NASAL
Qty: 30 ML | Refills: 11 | Status: SHIPPED | OUTPATIENT
Start: 2025-06-24

## 2025-06-26 ENCOUNTER — OFFICE VISIT (OUTPATIENT)
Dept: GASTROENTEROLOGY | Facility: CLINIC | Age: 75
End: 2025-06-26
Payer: MEDICARE

## 2025-06-26 VITALS
OXYGEN SATURATION: 96 % | SYSTOLIC BLOOD PRESSURE: 116 MMHG | TEMPERATURE: 96.8 F | BODY MASS INDEX: 31.71 KG/M2 | WEIGHT: 179 LBS | DIASTOLIC BLOOD PRESSURE: 68 MMHG | HEIGHT: 63 IN | HEART RATE: 63 BPM

## 2025-06-26 DIAGNOSIS — Z79.01 ANTICOAGULATED: ICD-10-CM

## 2025-06-26 DIAGNOSIS — Z12.11 ENCOUNTER FOR SCREENING FOR MALIGNANT NEOPLASM OF COLON: Primary | ICD-10-CM

## 2025-06-26 RX ORDER — SODIUM, POTASSIUM,MAG SULFATES 17.5-3.13G
2 SOLUTION, RECONSTITUTED, ORAL ORAL TAKE AS DIRECTED
Qty: 354 ML | Refills: 0 | Status: CANCELLED | OUTPATIENT
Start: 2025-06-26

## 2025-06-26 RX ORDER — SODIUM, POTASSIUM,MAG SULFATES 17.5-3.13G
2 SOLUTION, RECONSTITUTED, ORAL ORAL TAKE AS DIRECTED
Qty: 354 ML | Refills: 0 | Status: SHIPPED | OUTPATIENT
Start: 2025-06-26

## 2025-06-26 NOTE — PROGRESS NOTES
"Chief Complaint   Patient presents with    Colonoscopy       PCP: Vinita Melendez APRN  REFER: Vinita Melendez APRN    Subjective     HPI    Terra Garcia is a 74 y.o. female who presents to office for preventative maintenance.  There is not  a personal history of colon polyps.   She does not have complaints of nausea/vomiting, change in bowels, weight loss, no BRBPR, no melena.  There is not a family history of colon cancer in first degree relative.  There is not a family history of colon polyps in first degree relative.  Terra Garcia last colonoscopy-over 10 years ago (could have been 20 years ago) .  Bowels do move on regular basis.    According to Terra Garcia she did a cologuard test through [facility] that - came back \"slightly positive\"    No results for input(s): \"GLUCOSE\", \"NA\", \"K\", \"CREATININE\", \"BUN\", \"BCR\", \"ALKPHOS\", \"ALT\", \"AST\", \"BILITOT\", \"ALBUMIN\" in the last 04042 hours.    No results for input(s): \"EGFRIFNONA\", \"EGFRIFAFRI\", \"EGFRRESULT\" in the last 51478 hours.     Past Medical History:   Diagnosis Date    Abnormal ECG     Allergic rhinitis     Arrhythmia     Arthritis     Atrial fibrillation     Chronic sinusitis     Diabetes mellitus     Dizziness     Fatty liver     GERD (gastroesophageal reflux disease)     Hyperlipidemia     Mixed    Hypertension     Hypertrophy of inferior nasal turbinate     Imbalance     Palpitations     Pulmonary HTN 03/07/2023    Sleep apnea, obstructive     Thyroid nodule     Tinnitus     Tremor      Past Surgical History:   Procedure Laterality Date    ABLATION OF DYSRHYTHMIC FOCUS      CARDIAC ABLATION      FINGER SURGERY      TONSILLECTOMY      TUBAL ABDOMINAL LIGATION       Outpatient Medications Marked as Taking for the 6/26/25 encounter (Office Visit) with Toby Acevedo APRN   Medication Sig Dispense Refill    atorvastatin (LIPITOR) 20 MG tablet Take 1 tablet by mouth Every Night.      Azelastine HCl 137 MCG/SPRAY solution SPRAY TWO " SPRAYS into THE nostril(s) TWICE DAILY 30 mL 11    Calcium Carb-Cholecalciferol 600-20 MG-MCG tablet       CONTOUR NEXT TEST test strip USE AS DIRECTED TO check blood sugar daily  5    estradiol (VAGIFEM) 10 MCG tablet vaginal tablet Insert 1 tablet into the vagina 2 (Two) Times a Week.      fluticasone (FLONASE) 50 MCG/ACT nasal spray Administer 1 spray into the nostril(s) as directed by provider Daily As Needed for Allergies.      Furosemide (LASIX PO) Take  by mouth As Needed.      lisinopril (PRINIVIL,ZESTRIL) 10 MG tablet Take 1 tablet by mouth Daily. (Patient taking differently: Take 2 tablets by mouth Daily.) 90 tablet 4    loratadine (CLARITIN) 10 MG tablet Take 1 tablet by mouth Daily.      magnesium oxide (MAG-OX) 400 MG tablet Take 1 tablet by mouth Daily.      metFORMIN (GLUCOPHAGE) 500 MG tablet       metoprolol succinate XL (TOPROL-XL) 25 MG 24 hr tablet Take 1 tablet by mouth Every 12 (Twelve) Hours.      Multiple Vitamins-Minerals (CENTRUM SILVER ULTRA WOMENS) tablet Take 1 tablet by mouth Daily.      Omega-3 Fatty Acids (FISH OIL) 1000 MG capsule capsule Take 1 capsule by mouth 2 (Two) Times a Day.      omeprazole (priLOSEC) 40 MG capsule TAKE ONE CAPSULE BY MOUTH TWICE DAILY FOR 30 DAYS 60 capsule 2    POTASSIUM PO Take  by mouth As Needed (when taking lasix).      rivaroxaban (XARELTO) 20 MG tablet Take 1 tablet by mouth Daily With Dinner. 30 tablet 0    TRUEPLUS LANCETS 33G misc USE WITH GLUCOMETER TESTING ONCE DAILY  5     Current Facility-Administered Medications for the 6/26/25 encounter (Office Visit) with Toby Acevedo APRN   Medication Dose Route Frequency Provider Last Rate Last Admin    lidocaine (XYLOCAINE) 1 % injection 5 mL  5 mL Subcutaneous Once Thuy Monreal MD         Allergies   Allergen Reactions    Terramycin [Oxytetracycline]      Social History     Socioeconomic History    Marital status:    Tobacco Use    Smoking status: Never    Smokeless tobacco: Never    Vaping Use    Vaping status: Never Used   Substance and Sexual Activity    Alcohol use: Yes     Comment: rare    Drug use: Never    Sexual activity: Not Currently     Partners: Male     Birth control/protection: Surgical, Tubal ligation     Comment: postmenopausal       Review of Systems   Constitutional:  Negative for fever and unexpected weight change.   HENT:  Negative for trouble swallowing.    Respiratory:  Negative for shortness of breath.    Cardiovascular:  Negative for chest pain.   Gastrointestinal:  Negative for abdominal pain and anal bleeding.     Objective   Vitals:    06/26/25 1320   BP: 116/68   Pulse: 63   Temp: 96.8 °F (36 °C)   SpO2: 96%     Physical Exam  Constitutional:       Appearance: Normal appearance. She is well-developed.   Eyes:      General: No scleral icterus.  Cardiovascular:      Heart sounds: Normal heart sounds. No murmur heard.  Pulmonary:      Effort: Pulmonary effort is normal.   Abdominal:      General: Bowel sounds are normal. There is no distension.      Palpations: Abdomen is soft.      Tenderness: There is no abdominal tenderness. There is no guarding.   Skin:     General: Skin is warm and dry.      Coloration: Skin is not jaundiced.   Neurological:      Mental Status: She is alert.   Psychiatric:         Behavior: Behavior is cooperative.       Imaging Results (Most Recent)       None          Body mass index is 31.71 kg/m².    Assessment & Plan   Diagnoses and all orders for this visit:    1. Encounter for screening for malignant neoplasm of colon (Primary)  -     Case Request; Standing  -     Implement Anesthesia Orders Day of Procedure; Standing  -     Follow Anesthesia Guidelines / Protocol; Future  -     Obtain Informed Consent; Standing  -     Case Request    2. Anticoagulated    Other orders  -     sodium-potassium-magnesium sulfates (Suprep Bowel Prep Kit) 17.5-3.13-1.6 GM/177ML solution oral solution; Take 2 bottles by mouth Take As Directed. Take 1 bottle night  before and 1 bottle morning of procedure.  Time as directed  Dispense: 354 mL; Refill: 0      COLONOSCOPY WITH ANESTHESIA- (examination of colon) (N/A)      Patient is to continue all blood pressure and cardiac medications prior to procedure and has been advised to take medications morning of procedure   Pt verbalized understanding    Pt to hold diabetes medication/insulin day of procedure to prevent any risk of complications of hypoglycemia intraprocedure.  If taking insulin 1/2 the PM dose as well     Terra Garcia advised to go ahead and pick prep up from pharmacy if prescription to prevent medication from being placed back on the shelf.     All risks, benefits, alternatives, and indications of colonoscopy procedure have been discussed with the patient. Risks to include perforation of the colon requiring possible surgery or colostomy, risk of bleeding from biopsies or removal of colon tissue, possibility of missing a colon polyp or cancer, or adverse drug reaction.  Benefits to include the diagnosis and management of disease of the colon and rectum. Alternatives to include barium enema, radiographic evaluation, lab testing or no intervention. She verbalizes understanding and agrees.     Patient is to hold their anticoagulation medication per the direction of their prescribing provider,  Dr Cassidy. This is to prevent any risk or complication from bleeding intra and post procedure. If they develop bleeding post procedure they are to go the emergency department for further evaluation and treatment immediately.  We will obtain clearance from prescribing provider requesting Xarelto will need to be held x 24 hours prior to procedure.       Toby Acevedo, APRN  06/26/25        There are no Patient Instructions on file for this visit.

## 2025-07-02 ENCOUNTER — TELEPHONE (OUTPATIENT)
Dept: GASTROENTEROLOGY | Facility: CLINIC | Age: 75
End: 2025-07-02
Payer: MEDICARE

## 2025-07-29 ENCOUNTER — HOSPITAL ENCOUNTER (OUTPATIENT)
Facility: HOSPITAL | Age: 75
Setting detail: HOSPITAL OUTPATIENT SURGERY
Discharge: HOME | End: 2025-07-29
Attending: INTERNAL MEDICINE | Admitting: INTERNAL MEDICINE
Payer: MEDICARE

## 2025-07-29 ENCOUNTER — ANESTHESIA (OUTPATIENT)
Dept: GASTROENTEROLOGY | Facility: HOSPITAL | Age: 75
End: 2025-07-29
Payer: MEDICARE

## 2025-07-29 ENCOUNTER — ANESTHESIA EVENT (OUTPATIENT)
Dept: GASTROENTEROLOGY | Facility: HOSPITAL | Age: 75
End: 2025-07-29
Payer: MEDICARE

## 2025-07-29 VITALS
DIASTOLIC BLOOD PRESSURE: 68 MMHG | TEMPERATURE: 97.5 F | RESPIRATION RATE: 18 BRPM | WEIGHT: 177 LBS | HEART RATE: 70 BPM | OXYGEN SATURATION: 99 % | BODY MASS INDEX: 31.36 KG/M2 | HEIGHT: 63 IN | SYSTOLIC BLOOD PRESSURE: 132 MMHG

## 2025-07-29 DIAGNOSIS — Z12.11 ENCOUNTER FOR SCREENING FOR MALIGNANT NEOPLASM OF COLON: ICD-10-CM

## 2025-07-29 LAB — GLUCOSE BLDC GLUCOMTR-MCNC: 135 MG/DL (ref 70–130)

## 2025-07-29 PROCEDURE — 25810000003 SODIUM CHLORIDE 0.9 % SOLUTION: Performed by: NURSE ANESTHETIST, CERTIFIED REGISTERED

## 2025-07-29 PROCEDURE — 25810000003 SODIUM CHLORIDE 0.9 % SOLUTION: Performed by: ANESTHESIOLOGY

## 2025-07-29 PROCEDURE — 25010000002 PROPOFOL 10 MG/ML EMULSION: Performed by: NURSE ANESTHETIST, CERTIFIED REGISTERED

## 2025-07-29 PROCEDURE — 82948 REAGENT STRIP/BLOOD GLUCOSE: CPT | Performed by: ANESTHESIOLOGY

## 2025-07-29 PROCEDURE — 25010000002 LIDOCAINE PF 2% 2 % SOLUTION: Performed by: NURSE ANESTHETIST, CERTIFIED REGISTERED

## 2025-07-29 RX ORDER — SODIUM CHLORIDE 9 MG/ML
INJECTION, SOLUTION INTRAVENOUS CONTINUOUS PRN
Status: DISCONTINUED | OUTPATIENT
Start: 2025-07-29 | End: 2025-07-29 | Stop reason: SURG

## 2025-07-29 RX ORDER — SODIUM CHLORIDE 0.9 % (FLUSH) 0.9 %
10 SYRINGE (ML) INJECTION AS NEEDED
Status: DISCONTINUED | OUTPATIENT
Start: 2025-07-29 | End: 2025-07-29 | Stop reason: HOSPADM

## 2025-07-29 RX ORDER — LIDOCAINE HYDROCHLORIDE 10 MG/ML
0.5 INJECTION, SOLUTION EPIDURAL; INFILTRATION; INTRACAUDAL; PERINEURAL ONCE AS NEEDED
Status: DISCONTINUED | OUTPATIENT
Start: 2025-07-29 | End: 2025-07-29 | Stop reason: HOSPADM

## 2025-07-29 RX ORDER — LIDOCAINE HYDROCHLORIDE 20 MG/ML
INJECTION, SOLUTION EPIDURAL; INFILTRATION; INTRACAUDAL; PERINEURAL AS NEEDED
Status: DISCONTINUED | OUTPATIENT
Start: 2025-07-29 | End: 2025-07-29 | Stop reason: SURG

## 2025-07-29 RX ORDER — SODIUM CHLORIDE 9 MG/ML
500 INJECTION, SOLUTION INTRAVENOUS ONCE
Status: COMPLETED | OUTPATIENT
Start: 2025-07-29 | End: 2025-07-29

## 2025-07-29 RX ORDER — PROPOFOL 10 MG/ML
VIAL (ML) INTRAVENOUS AS NEEDED
Status: DISCONTINUED | OUTPATIENT
Start: 2025-07-29 | End: 2025-07-29 | Stop reason: SURG

## 2025-07-29 RX ADMIN — PROPOFOL 100 MG: 10 INJECTION, EMULSION INTRAVENOUS at 12:15

## 2025-07-29 RX ADMIN — SODIUM CHLORIDE 500 ML: 9 INJECTION, SOLUTION INTRAVENOUS at 12:09

## 2025-07-29 RX ADMIN — PROPOFOL 50 MG: 10 INJECTION, EMULSION INTRAVENOUS at 12:23

## 2025-07-29 RX ADMIN — LIDOCAINE HYDROCHLORIDE 100 MG: 20 INJECTION, SOLUTION EPIDURAL; INFILTRATION; INTRACAUDAL; PERINEURAL at 12:15

## 2025-07-29 RX ADMIN — PROPOFOL 50 MG: 10 INJECTION, EMULSION INTRAVENOUS at 12:16

## 2025-07-29 RX ADMIN — SODIUM CHLORIDE: 9 INJECTION, SOLUTION INTRAVENOUS at 12:12

## 2025-07-29 RX ADMIN — PROPOFOL 50 MG: 10 INJECTION, EMULSION INTRAVENOUS at 12:17

## 2025-07-29 NOTE — ANESTHESIA PREPROCEDURE EVALUATION
Anesthesia Evaluation     Patient summary reviewed   NPO Solid Status: > 8 hours             Airway   Mallampati: II  Dental      Pulmonary    (+) ,sleep apnea  Cardiovascular     (+) hypertension, dysrhythmias Atrial Fib      Neuro/Psych  GI/Hepatic/Renal/Endo    (+) GERD, liver disease fatty liver disease, diabetes mellitus, thyroid problem     Musculoskeletal     Abdominal    Substance History      OB/GYN          Other                          Anesthesia Plan    ASA 3     MAC     (Reviewed cards f/u 7/2025 )  intravenous induction     Anesthetic plan, risks, benefits, and alternatives have been provided, discussed and informed consent has been obtained with: patient.        CODE STATUS:

## 2025-07-29 NOTE — ANESTHESIA POSTPROCEDURE EVALUATION
Patient: Terra Garcia    Procedure Summary       Date: 07/29/25 Room / Location:  [facility] /  [facility]    Anesthesia Start: 1212 Anesthesia Stop: 1230    Procedure: COLONOSCOPY WITH ANESTHESIA- (examination of colon) Diagnosis:       Encounter for screening for malignant neoplasm of colon      (Encounter for screening for malignant neoplasm of colon [Z12.11])    Surgeons: Randy Gaxiola DO Provider: Julio Cedeno CRNA    Anesthesia Type: MAC ASA Status: 3            Anesthesia Type: MAC    Vitals  Vitals Value Taken Time   BP     Temp     Pulse 61 07/29/25 12:31   Resp     SpO2 95 % 07/29/25 12:31   Vitals shown include unfiled device data.        Post Anesthesia Care and Evaluation    Patient location during evaluation: PHASE II  Patient participation: complete - patient participated  Level of consciousness: awake and sleepy but conscious  Pain score: 0  Pain management: adequate    Airway patency: patent  Anesthetic complications: No anesthetic complications    Cardiovascular status: acceptable  Respiratory status: acceptable  Hydration status: acceptable

## 2025-07-29 NOTE — H&P
[facility]  Pre Procedure History & Physical    Chief Complaint:   Cologuard Positive    Subjective     HPI:   Cologuard Positive    Past Medical History:   Past Medical History:   Diagnosis Date    Abnormal ECG     Allergic rhinitis     Arrhythmia     Arthritis     Atrial fibrillation     Chronic sinusitis     Diabetes mellitus     Dizziness     Fatty liver     GERD (gastroesophageal reflux disease)     Hyperlipidemia     Mixed    Hypertension     Hypertrophy of inferior nasal turbinate     Imbalance     Palpitations     Pulmonary HTN 03/07/2023    Sleep apnea, obstructive     Thyroid nodule     Tinnitus     Tremor        Past Surgical History:  Past Surgical History:   Procedure Laterality Date    ABLATION OF DYSRHYTHMIC FOCUS      CARDIAC ABLATION      FINGER SURGERY      TONSILLECTOMY      TUBAL ABDOMINAL LIGATION         Family History:  Family History   Problem Relation Age of Onset    Heart disease Mother     Heart attack Mother     Hypertension Mother     Migraines Mother     Atrial fibrillation Father     Cancer Father     Hypertension Father     Arrhythmia Father         A fib    Colon cancer Neg Hx        Social History:   reports that she has never smoked. She has never used smokeless tobacco. She reports current alcohol use. She reports that she does not use drugs.    Medications:   Prior to Admission medications    Medication Sig Start Date End Date Taking? Authorizing Provider   atorvastatin (LIPITOR) 20 MG tablet Take 1 tablet by mouth Every Night. 11/17/16  Yes Aurelio Cotter MD   Azelastine HCl 137 MCG/SPRAY solution SPRAY TWO SPRAYS into THE nostril(s) TWICE DAILY 6/24/25  Yes Mary Ibarra APRN   Calcium Carb-Cholecalciferol 600-20 MG-MCG tablet  12/12/22  Yes Aurelio Cotter MD   CONTOUR NEXT TEST test strip USE AS DIRECTED TO check blood sugar daily 9/3/19  Yes Aurelio Cotter MD   empagliflozin (Jardiance) 10 MG tablet tablet Take 1 tablet by mouth  Daily.   Yes Aurelio Cotter MD   estradiol (VAGIFEM) 10 MCG tablet vaginal tablet Insert 1 tablet into the vagina 2 (Two) Times a Week.   Yes Aurelio Cotter MD   fluticasone (FLONASE) 50 MCG/ACT nasal spray Administer 1 spray into the nostril(s) as directed by provider Daily As Needed for Allergies.   Yes Aurelio Cotetr MD   Furosemide (LASIX PO) Take  by mouth As Needed.   Yes Aurelio Cotter MD   lisinopril (PRINIVIL,ZESTRIL) 10 MG tablet Take 1 tablet by mouth Daily.  Patient taking differently: Take 2 tablets by mouth Daily. 11/10/21  Yes Dionicio Cassidy MD   loratadine (CLARITIN) 10 MG tablet Take 1 tablet by mouth Daily.   Yes Aurelio Cotter MD   magnesium oxide (MAG-OX) 400 MG tablet Take 1 tablet by mouth Daily.   Yes Aurelio Cotter MD   metFORMIN (GLUCOPHAGE) 500 MG tablet  4/10/23  Yes Aurelio Cotter MD   metoprolol succinate XL (TOPROL-XL) 25 MG 24 hr tablet Take 1 tablet by mouth Every 12 (Twelve) Hours. 3/13/23  Yes Aurelio Cotter MD   Multiple Vitamins-Minerals (CENTRUM SILVER ULTRA WOMENS) tablet Take 1 tablet by mouth Daily.   Yes Aurelio Cotter MD   Omega-3 Fatty Acids (FISH OIL) 1000 MG capsule capsule Take 1 capsule by mouth 2 (Two) Times a Day.   Yes Aurelio Cotter MD   omeprazole (priLOSEC) 40 MG capsule TAKE ONE CAPSULE BY MOUTH TWICE DAILY FOR 30 DAYS 5/10/24  Yes Mary Ibarra APRN   POTASSIUM PO Take  by mouth As Needed (when taking lasix).   Yes Aurelio Cotter MD   TRUEPLUS LANCETS 33G misc USE WITH GLUCOMETER TESTING ONCE DAILY 9/11/19  Yes Aurelio Cotter MD   vitamin B-12 (CYANOCOBALAMIN) 1000 MCG tablet Take 1 tablet by mouth Daily.  7/29/25 Yes Aurelio Cotter MD   rivaroxaban (XARELTO) 20 MG tablet Take 1 tablet by mouth Daily With Dinner. 1/20/17   Salma Green,    HYDROcodone-acetaminophen (NORCO)  MG per tablet Take 1 tablet by mouth Every 6 (Six) Hours As Needed. 7/1/24 7/29/25   "Provider, MD Aurelio   sodium-potassium-magnesium sulfates (Suprep Bowel Prep Kit) 17.5-3.13-1.6 GM/177ML solution oral solution Take 2 bottles by mouth Take As Directed. Take 1 bottle night before and 1 bottle morning of procedure.  Time as directed 6/26/25 7/29/25  Toby Acevedo APRN       Allergies:  Terramycin [oxytetracycline]    ROS:    General: Weight stable  Resp: No SOA  Cardiovascular: No CP    Objective     Blood pressure 143/58, pulse 63, temperature 97.5 °F (36.4 °C), temperature source Temporal, resp. rate 20, height 160 cm (63\"), weight 80.3 kg (177 lb), SpO2 95%, not currently breastfeeding.    Physical Exam   Constitutional: Pt is oriented to person, place, and in no distress.   HENT: Mouth/Throat: Oropharynx is clear.   Cardiovascular: Normal rate, regular rhythm.    Pulmonary/Chest: Effort normal. No respiratory distress. No  wheezes.   Abdominal: Soft. Non-distended.  Skin: Skin is warm and dry.   Psychiatric: Mood, memory, affect and judgment appear normal.     Assessment & Plan     Diagnosis:  Cologuard Positive     Anticipated Surgical Procedure:  C-scope    The risks, benefits, and alternatives of this procedure have been discussed with the patient or the responsible party- the patient understands and agrees to proceed.        "

## 2025-08-04 ENCOUNTER — TELEPHONE (OUTPATIENT)
Dept: GASTROENTEROLOGY | Facility: CLINIC | Age: 75
End: 2025-08-04
Payer: MEDICARE

## (undated) DEVICE — THE CHANNEL CLEANING BRUSH IS A NYLON FLEXI BRUSH ATTACHED TO A FLEXIBLE PLASTIC SHEATH DESIGNED TO SAFELY REMOVE DEBRIS FROM FLEXIBLE ENDOSCOPES.

## (undated) DEVICE — MASK,OXYGEN,MED CONC,ADLT,7' TUB, UC: Brand: PENDING

## (undated) DEVICE — DEFENDO AIR WATER SUCTION AND BIOPSY VALVE KIT FOR  OLYMPUS: Brand: DEFENDO AIR/WATER/SUCTION AND BIOPSY VALVE

## (undated) DEVICE — SENSR O2 OXIMAX FNGR A/ 18IN NONSTR

## (undated) DEVICE — ARGYLE YANKAUER BULB TIP WITH VENT: Brand: ARGYLE